# Patient Record
Sex: MALE | Race: BLACK OR AFRICAN AMERICAN | NOT HISPANIC OR LATINO | URBAN - METROPOLITAN AREA
[De-identification: names, ages, dates, MRNs, and addresses within clinical notes are randomized per-mention and may not be internally consistent; named-entity substitution may affect disease eponyms.]

---

## 2019-01-01 ENCOUNTER — INPATIENT (INPATIENT)
Facility: HOSPITAL | Age: 64
LOS: 20 days | DRG: 314 | End: 2019-04-04
Attending: INTERNAL MEDICINE | Admitting: HOSPITALIST
Payer: COMMERCIAL

## 2019-01-01 VITALS
TEMPERATURE: 98 F | WEIGHT: 195.11 LBS | SYSTOLIC BLOOD PRESSURE: 104 MMHG | RESPIRATION RATE: 16 BRPM | HEIGHT: 71 IN | HEART RATE: 85 BPM | DIASTOLIC BLOOD PRESSURE: 58 MMHG | OXYGEN SATURATION: 98 %

## 2019-01-01 VITALS
HEART RATE: 142 BPM | RESPIRATION RATE: 20 BRPM | SYSTOLIC BLOOD PRESSURE: 86 MMHG | OXYGEN SATURATION: 97 % | DIASTOLIC BLOOD PRESSURE: 63 MMHG | TEMPERATURE: 97 F

## 2019-01-01 DIAGNOSIS — R52 PAIN, UNSPECIFIED: ICD-10-CM

## 2019-01-01 DIAGNOSIS — I38 ENDOCARDITIS, VALVE UNSPECIFIED: ICD-10-CM

## 2019-01-01 DIAGNOSIS — T82.6XXA INFECTION AND INFLAMMATORY REACTION DUE TO CARDIAC VALVE PROSTHESIS, INITIAL ENCOUNTER: ICD-10-CM

## 2019-01-01 DIAGNOSIS — Z95.2 PRESENCE OF PROSTHETIC HEART VALVE: Chronic | ICD-10-CM

## 2019-01-01 DIAGNOSIS — R56.9 UNSPECIFIED CONVULSIONS: ICD-10-CM

## 2019-01-01 DIAGNOSIS — N17.9 ACUTE KIDNEY FAILURE, UNSPECIFIED: ICD-10-CM

## 2019-01-01 DIAGNOSIS — E03.9 HYPOTHYROIDISM, UNSPECIFIED: ICD-10-CM

## 2019-01-01 DIAGNOSIS — Z29.9 ENCOUNTER FOR PROPHYLACTIC MEASURES, UNSPECIFIED: ICD-10-CM

## 2019-01-01 DIAGNOSIS — F22 DELUSIONAL DISORDERS: ICD-10-CM

## 2019-01-01 DIAGNOSIS — Z90.5 ACQUIRED ABSENCE OF KIDNEY: Chronic | ICD-10-CM

## 2019-01-01 DIAGNOSIS — I50.9 HEART FAILURE, UNSPECIFIED: ICD-10-CM

## 2019-01-01 DIAGNOSIS — I33.0 ACUTE AND SUBACUTE INFECTIVE ENDOCARDITIS: ICD-10-CM

## 2019-01-01 DIAGNOSIS — A41.9 SEPSIS, UNSPECIFIED ORGANISM: ICD-10-CM

## 2019-01-01 DIAGNOSIS — I76 SEPTIC ARTERIAL EMBOLISM: ICD-10-CM

## 2019-01-01 DIAGNOSIS — D69.6 THROMBOCYTOPENIA, UNSPECIFIED: ICD-10-CM

## 2019-01-01 DIAGNOSIS — R50.9 FEVER, UNSPECIFIED: ICD-10-CM

## 2019-01-01 DIAGNOSIS — I10 ESSENTIAL (PRIMARY) HYPERTENSION: ICD-10-CM

## 2019-01-01 DIAGNOSIS — D61.818 OTHER PANCYTOPENIA: ICD-10-CM

## 2019-01-01 DIAGNOSIS — Z95.2 PRESENCE OF PROSTHETIC HEART VALVE: ICD-10-CM

## 2019-01-01 DIAGNOSIS — I95.89 OTHER HYPOTENSION: ICD-10-CM

## 2019-01-01 DIAGNOSIS — F05 DELIRIUM DUE TO KNOWN PHYSIOLOGICAL CONDITION: ICD-10-CM

## 2019-01-01 DIAGNOSIS — Z51.5 ENCOUNTER FOR PALLIATIVE CARE: ICD-10-CM

## 2019-01-01 DIAGNOSIS — R45.1 RESTLESSNESS AND AGITATION: ICD-10-CM

## 2019-01-01 DIAGNOSIS — D64.9 ANEMIA, UNSPECIFIED: ICD-10-CM

## 2019-01-01 DIAGNOSIS — N40.0 BENIGN PROSTATIC HYPERPLASIA WITHOUT LOWER URINARY TRACT SYMPTOMS: ICD-10-CM

## 2019-01-01 LAB
ALBUMIN SERPL ELPH-MCNC: 2.1 G/DL — LOW (ref 3.3–5)
ALBUMIN SERPL ELPH-MCNC: 2.6 G/DL — LOW (ref 3.3–5)
ALBUMIN SERPL ELPH-MCNC: 2.8 G/DL — LOW (ref 3.3–5)
ALP SERPL-CCNC: 104 U/L — SIGNIFICANT CHANGE UP (ref 40–120)
ALP SERPL-CCNC: 70 U/L — SIGNIFICANT CHANGE UP (ref 40–120)
ALP SERPL-CCNC: 80 U/L — SIGNIFICANT CHANGE UP (ref 40–120)
ALT FLD-CCNC: 10 U/L — SIGNIFICANT CHANGE UP (ref 10–45)
ALT FLD-CCNC: 22 U/L — SIGNIFICANT CHANGE UP (ref 10–45)
ALT FLD-CCNC: 33 U/L — SIGNIFICANT CHANGE UP (ref 10–45)
AMPHET UR-MCNC: NEGATIVE — SIGNIFICANT CHANGE UP
ANION GAP SERPL CALC-SCNC: 13 MMOL/L — SIGNIFICANT CHANGE UP (ref 5–17)
ANION GAP SERPL CALC-SCNC: 14 MMOL/L — SIGNIFICANT CHANGE UP (ref 5–17)
ANION GAP SERPL CALC-SCNC: 15 MMOL/L — SIGNIFICANT CHANGE UP (ref 5–17)
ANION GAP SERPL CALC-SCNC: 16 MMOL/L — SIGNIFICANT CHANGE UP (ref 5–17)
ANION GAP SERPL CALC-SCNC: 16 MMOL/L — SIGNIFICANT CHANGE UP (ref 5–17)
ANION GAP SERPL CALC-SCNC: 17 MMOL/L — SIGNIFICANT CHANGE UP (ref 5–17)
APPEARANCE UR: ABNORMAL
APPEARANCE UR: CLEAR — SIGNIFICANT CHANGE UP
APTT BLD: 43 SEC — HIGH (ref 27.5–36.3)
APTT BLD: 44.4 SEC — HIGH (ref 27.5–36.3)
APTT BLD: 44.9 SEC — HIGH (ref 27.5–36.3)
APTT BLD: 46.2 SEC — HIGH (ref 27.5–36.3)
AST SERPL-CCNC: 37 U/L — SIGNIFICANT CHANGE UP (ref 10–40)
AST SERPL-CCNC: 47 U/L — HIGH (ref 10–40)
AST SERPL-CCNC: 68 U/L — HIGH (ref 10–40)
B HENSELAE IGG SER-ACNC: SIGNIFICANT CHANGE UP TITER
B HENSELAE IGM SER-ACNC: SIGNIFICANT CHANGE UP TITER
B QUINTANA IGG SERPL-ACNC: SIGNIFICANT CHANGE UP TITER
B QUINTANA IGM SER-ACNC: SIGNIFICANT CHANGE UP TITER
B19V IGG SER-ACNC: 5.4 INDEX — HIGH (ref 0–0.8)
B19V IGG+IGM SER-IMP: POSITIVE
B19V IGG+IGM SER-IMP: SIGNIFICANT CHANGE UP
B19V IGM FLD-ACNC: 0.1 — SIGNIFICANT CHANGE UP
B19V IGM SER-ACNC: NEGATIVE — SIGNIFICANT CHANGE UP
BACTERIA # UR AUTO: NEGATIVE — SIGNIFICANT CHANGE UP
BARBITURATES UR SCN-MCNC: NEGATIVE — SIGNIFICANT CHANGE UP
BASE EXCESS BLDV CALC-SCNC: 1.3 MMOL/L — SIGNIFICANT CHANGE UP (ref -2–2)
BASOPHILS # BLD AUTO: 0 K/UL — SIGNIFICANT CHANGE UP (ref 0–0.2)
BASOPHILS # BLD AUTO: 0.03 K/UL — SIGNIFICANT CHANGE UP (ref 0–0.2)
BASOPHILS # BLD AUTO: 0.04 K/UL — SIGNIFICANT CHANGE UP (ref 0–0.2)
BASOPHILS # BLD AUTO: 0.05 K/UL — SIGNIFICANT CHANGE UP (ref 0–0.2)
BASOPHILS NFR BLD AUTO: 0.1 % — SIGNIFICANT CHANGE UP (ref 0–2)
BASOPHILS NFR BLD AUTO: 0.5 % — SIGNIFICANT CHANGE UP (ref 0–2)
BASOPHILS NFR BLD AUTO: 0.9 % — SIGNIFICANT CHANGE UP (ref 0–2)
BASOPHILS NFR BLD AUTO: 1.6 % — SIGNIFICANT CHANGE UP (ref 0–2)
BENZODIAZ UR-MCNC: NEGATIVE — SIGNIFICANT CHANGE UP
BILIRUB SERPL-MCNC: 0.5 MG/DL — SIGNIFICANT CHANGE UP (ref 0.2–1.2)
BILIRUB SERPL-MCNC: 0.6 MG/DL — SIGNIFICANT CHANGE UP (ref 0.2–1.2)
BILIRUB SERPL-MCNC: 0.6 MG/DL — SIGNIFICANT CHANGE UP (ref 0.2–1.2)
BILIRUB UR-MCNC: NEGATIVE — SIGNIFICANT CHANGE UP
BILIRUB UR-MCNC: NEGATIVE — SIGNIFICANT CHANGE UP
BLD GP AB SCN SERPL QL: NEGATIVE — SIGNIFICANT CHANGE UP
BLD GP AB SCN SERPL QL: NEGATIVE — SIGNIFICANT CHANGE UP
BRUCELLA IGG SER QL IA: NEGATIVE — SIGNIFICANT CHANGE UP
BRUCELLA IGG+IGM SER-IMP: SIGNIFICANT CHANGE UP
BRUCELLA IGM SER QL IA: NEGATIVE — SIGNIFICANT CHANGE UP
BUN SERPL-MCNC: 19 MG/DL — SIGNIFICANT CHANGE UP (ref 7–23)
BUN SERPL-MCNC: 22 MG/DL — SIGNIFICANT CHANGE UP (ref 7–23)
BUN SERPL-MCNC: 22 MG/DL — SIGNIFICANT CHANGE UP (ref 7–23)
BUN SERPL-MCNC: 23 MG/DL — SIGNIFICANT CHANGE UP (ref 7–23)
BUN SERPL-MCNC: 24 MG/DL — HIGH (ref 7–23)
BUN SERPL-MCNC: 25 MG/DL — HIGH (ref 7–23)
BUN SERPL-MCNC: 26 MG/DL — HIGH (ref 7–23)
BUN SERPL-MCNC: 27 MG/DL — HIGH (ref 7–23)
BUN SERPL-MCNC: 28 MG/DL — HIGH (ref 7–23)
BUN SERPL-MCNC: 28 MG/DL — HIGH (ref 7–23)
BUN SERPL-MCNC: 30 MG/DL — HIGH (ref 7–23)
BUN SERPL-MCNC: 32 MG/DL — HIGH (ref 7–23)
BUN SERPL-MCNC: 41 MG/DL — HIGH (ref 7–23)
C BURNET PH1 + PH2 IGG+IGM SER-IMP: SIGNIFICANT CHANGE UP
C BURNET PH1 IGG TITR FLD: SIGNIFICANT CHANGE UP
C BURNET PH1 IGG TITR FLD: SIGNIFICANT CHANGE UP
C BURNET PH1 IGM TITR FLD: SIGNIFICANT CHANGE UP
C BURNET PH1 IGM TITR FLD: SIGNIFICANT CHANGE UP
C PNEUM IGM TITR SER: SIGNIFICANT CHANGE UP TITER
C PSITTACI IGG SER-ACNC: SIGNIFICANT CHANGE UP TITER
C PSITTACI IGM TITR SER: SIGNIFICANT CHANGE UP TITER
C TRACH IGG TITR SER: SIGNIFICANT CHANGE UP TITER
C TRACH IGM SER-ACNC: SIGNIFICANT CHANGE UP TITER
CA-I SERPL-SCNC: 1.12 MMOL/L — SIGNIFICANT CHANGE UP (ref 1.12–1.3)
CALCIUM SERPL-MCNC: 8.2 MG/DL — LOW (ref 8.4–10.5)
CALCIUM SERPL-MCNC: 8.2 MG/DL — LOW (ref 8.4–10.5)
CALCIUM SERPL-MCNC: 8.3 MG/DL — LOW (ref 8.4–10.5)
CALCIUM SERPL-MCNC: 8.3 MG/DL — LOW (ref 8.4–10.5)
CALCIUM SERPL-MCNC: 8.6 MG/DL — SIGNIFICANT CHANGE UP (ref 8.4–10.5)
CALCIUM SERPL-MCNC: 8.7 MG/DL — SIGNIFICANT CHANGE UP (ref 8.4–10.5)
CALCIUM SERPL-MCNC: 8.8 MG/DL — SIGNIFICANT CHANGE UP (ref 8.4–10.5)
CALCIUM SERPL-MCNC: 8.8 MG/DL — SIGNIFICANT CHANGE UP (ref 8.4–10.5)
CALCIUM SERPL-MCNC: 8.9 MG/DL — SIGNIFICANT CHANGE UP (ref 8.4–10.5)
CALCIUM SERPL-MCNC: 8.9 MG/DL — SIGNIFICANT CHANGE UP (ref 8.4–10.5)
CALCIUM SERPL-MCNC: 9 MG/DL — SIGNIFICANT CHANGE UP (ref 8.4–10.5)
CALCIUM SERPL-MCNC: 9.1 MG/DL — SIGNIFICANT CHANGE UP (ref 8.4–10.5)
CALCIUM SERPL-MCNC: 9.2 MG/DL — SIGNIFICANT CHANGE UP (ref 8.4–10.5)
CALCIUM SERPL-MCNC: 9.3 MG/DL — SIGNIFICANT CHANGE UP (ref 8.4–10.5)
CALCIUM SERPL-MCNC: 9.4 MG/DL — SIGNIFICANT CHANGE UP (ref 8.4–10.5)
CHLAMYDIA IGG SER-ACNC: SIGNIFICANT CHANGE UP TITER
CHLORIDE BLDV-SCNC: 103 MMOL/L — SIGNIFICANT CHANGE UP (ref 96–108)
CHLORIDE SERPL-SCNC: 100 MMOL/L — SIGNIFICANT CHANGE UP (ref 96–108)
CHLORIDE SERPL-SCNC: 101 MMOL/L — SIGNIFICANT CHANGE UP (ref 96–108)
CHLORIDE SERPL-SCNC: 102 MMOL/L — SIGNIFICANT CHANGE UP (ref 96–108)
CHLORIDE SERPL-SCNC: 103 MMOL/L — SIGNIFICANT CHANGE UP (ref 96–108)
CHLORIDE SERPL-SCNC: 104 MMOL/L — SIGNIFICANT CHANGE UP (ref 96–108)
CHLORIDE SERPL-SCNC: 104 MMOL/L — SIGNIFICANT CHANGE UP (ref 96–108)
CHLORIDE SERPL-SCNC: 105 MMOL/L — SIGNIFICANT CHANGE UP (ref 96–108)
CHLORIDE SERPL-SCNC: 105 MMOL/L — SIGNIFICANT CHANGE UP (ref 96–108)
CHLORIDE SERPL-SCNC: 106 MMOL/L — SIGNIFICANT CHANGE UP (ref 96–108)
CHLORIDE SERPL-SCNC: 97 MMOL/L — SIGNIFICANT CHANGE UP (ref 96–108)
CHLORIDE SERPL-SCNC: 98 MMOL/L — SIGNIFICANT CHANGE UP (ref 96–108)
CHLORIDE SERPL-SCNC: 98 MMOL/L — SIGNIFICANT CHANGE UP (ref 96–108)
CHOLEST SERPL-MCNC: 90 MG/DL — SIGNIFICANT CHANGE UP (ref 10–199)
CK MB BLD-MCNC: 8.8 % — HIGH (ref 0–3.5)
CK MB CFR SERPL CALC: 5.1 NG/ML — SIGNIFICANT CHANGE UP (ref 0–6.7)
CK SERPL-CCNC: 109 U/L — SIGNIFICANT CHANGE UP (ref 30–200)
CK SERPL-CCNC: 58 U/L — SIGNIFICANT CHANGE UP (ref 30–200)
CMV IGG FLD QL: 0.27 U/ML — SIGNIFICANT CHANGE UP
CMV IGG SERPL-IMP: NEGATIVE — SIGNIFICANT CHANGE UP
CMV IGM FLD-ACNC: <8 AU/ML — SIGNIFICANT CHANGE UP
CMV IGM SERPL QL: NEGATIVE — SIGNIFICANT CHANGE UP
CO2 BLDV-SCNC: 26 MMOL/L — SIGNIFICANT CHANGE UP (ref 22–30)
CO2 SERPL-SCNC: 20 MMOL/L — LOW (ref 22–31)
CO2 SERPL-SCNC: 21 MMOL/L — LOW (ref 22–31)
CO2 SERPL-SCNC: 21 MMOL/L — LOW (ref 22–31)
CO2 SERPL-SCNC: 22 MMOL/L — SIGNIFICANT CHANGE UP (ref 22–31)
CO2 SERPL-SCNC: 23 MMOL/L — SIGNIFICANT CHANGE UP (ref 22–31)
CO2 SERPL-SCNC: 24 MMOL/L — SIGNIFICANT CHANGE UP (ref 22–31)
CO2 SERPL-SCNC: 25 MMOL/L — SIGNIFICANT CHANGE UP (ref 22–31)
COCAINE METAB.OTHER UR-MCNC: NEGATIVE — SIGNIFICANT CHANGE UP
COLOR SPEC: ABNORMAL
COLOR SPEC: ABNORMAL
CREAT ?TM UR-MCNC: 347 MG/DL — SIGNIFICANT CHANGE UP
CREAT ?TM UR-MCNC: 53 MG/DL — SIGNIFICANT CHANGE UP
CREAT SERPL-MCNC: 2.32 MG/DL — HIGH (ref 0.5–1.3)
CREAT SERPL-MCNC: 2.41 MG/DL — HIGH (ref 0.5–1.3)
CREAT SERPL-MCNC: 2.41 MG/DL — HIGH (ref 0.5–1.3)
CREAT SERPL-MCNC: 2.5 MG/DL — HIGH (ref 0.5–1.3)
CREAT SERPL-MCNC: 2.5 MG/DL — HIGH (ref 0.5–1.3)
CREAT SERPL-MCNC: 2.6 MG/DL — HIGH (ref 0.5–1.3)
CREAT SERPL-MCNC: 2.66 MG/DL — HIGH (ref 0.5–1.3)
CREAT SERPL-MCNC: 2.8 MG/DL — HIGH (ref 0.5–1.3)
CREAT SERPL-MCNC: 2.83 MG/DL — HIGH (ref 0.5–1.3)
CREAT SERPL-MCNC: 2.84 MG/DL — HIGH (ref 0.5–1.3)
CREAT SERPL-MCNC: 2.88 MG/DL — HIGH (ref 0.5–1.3)
CREAT SERPL-MCNC: 3.01 MG/DL — HIGH (ref 0.5–1.3)
CREAT SERPL-MCNC: 3.03 MG/DL — HIGH (ref 0.5–1.3)
CREAT SERPL-MCNC: 3.14 MG/DL — HIGH (ref 0.5–1.3)
CREAT SERPL-MCNC: 4.37 MG/DL — HIGH (ref 0.5–1.3)
CREAT SERPL-MCNC: 4.84 MG/DL — HIGH (ref 0.5–1.3)
CREAT SERPL-MCNC: 6.57 MG/DL — HIGH (ref 0.5–1.3)
CULTURE RESULTS: NO GROWTH — SIGNIFICANT CHANGE UP
CULTURE RESULTS: NO GROWTH — SIGNIFICANT CHANGE UP
CULTURE RESULTS: SIGNIFICANT CHANGE UP
DIFF PNL FLD: ABNORMAL
DIFF PNL FLD: ABNORMAL
EBV EA AB SER IA-ACNC: <5 U/ML — SIGNIFICANT CHANGE UP
EBV EA AB TITR SER IF: POSITIVE
EBV EA IGG SER-ACNC: NEGATIVE — SIGNIFICANT CHANGE UP
EBV NA IGG SER IA-ACNC: 166 U/ML — HIGH
EBV PATRN SPEC IB-IMP: SIGNIFICANT CHANGE UP
EBV VCA IGG AVIDITY SER QL IA: POSITIVE
EBV VCA IGM SER IA-ACNC: 297 U/ML — HIGH
EBV VCA IGM SER IA-ACNC: <10 U/ML — SIGNIFICANT CHANGE UP
EBV VCA IGM TITR FLD: NEGATIVE — SIGNIFICANT CHANGE UP
EOSINOPHIL # BLD AUTO: 0.04 K/UL — SIGNIFICANT CHANGE UP (ref 0–0.5)
EOSINOPHIL # BLD AUTO: 0.17 K/UL — SIGNIFICANT CHANGE UP (ref 0–0.5)
EOSINOPHIL # BLD AUTO: 0.6 K/UL — HIGH (ref 0–0.5)
EOSINOPHIL # BLD AUTO: 0.65 K/UL — HIGH (ref 0–0.5)
EOSINOPHIL NFR BLD AUTO: 1.2 % — SIGNIFICANT CHANGE UP (ref 0–6)
EOSINOPHIL NFR BLD AUTO: 5.6 % — SIGNIFICANT CHANGE UP (ref 0–6)
EOSINOPHIL NFR BLD AUTO: 5.8 % — SIGNIFICANT CHANGE UP (ref 0–6)
EOSINOPHIL NFR BLD AUTO: 7.4 % — HIGH (ref 0–6)
EPI CELLS # UR: 1 /HPF — SIGNIFICANT CHANGE UP
FERRITIN SERPL-MCNC: 924 NG/ML — HIGH (ref 30–400)
FOLATE SERPL-MCNC: 5.3 NG/ML — SIGNIFICANT CHANGE UP
GAMMA INTERFERON BACKGROUND BLD IA-ACNC: 0.02 IU/ML — SIGNIFICANT CHANGE UP
GAS PNL BLDV: 132 MMOL/L — LOW (ref 136–145)
GAS PNL BLDV: SIGNIFICANT CHANGE UP
GLUCOSE BLDV-MCNC: 94 MG/DL — SIGNIFICANT CHANGE UP (ref 70–99)
GLUCOSE SERPL-MCNC: 100 MG/DL — HIGH (ref 70–99)
GLUCOSE SERPL-MCNC: 102 MG/DL — HIGH (ref 70–99)
GLUCOSE SERPL-MCNC: 103 MG/DL — HIGH (ref 70–99)
GLUCOSE SERPL-MCNC: 107 MG/DL — HIGH (ref 70–99)
GLUCOSE SERPL-MCNC: 108 MG/DL — HIGH (ref 70–99)
GLUCOSE SERPL-MCNC: 110 MG/DL — HIGH (ref 70–99)
GLUCOSE SERPL-MCNC: 112 MG/DL — HIGH (ref 70–99)
GLUCOSE SERPL-MCNC: 113 MG/DL — HIGH (ref 70–99)
GLUCOSE SERPL-MCNC: 117 MG/DL — HIGH (ref 70–99)
GLUCOSE SERPL-MCNC: 118 MG/DL — HIGH (ref 70–99)
GLUCOSE SERPL-MCNC: 124 MG/DL — HIGH (ref 70–99)
GLUCOSE SERPL-MCNC: 136 MG/DL — HIGH (ref 70–99)
GLUCOSE SERPL-MCNC: 81 MG/DL — SIGNIFICANT CHANGE UP (ref 70–99)
GLUCOSE SERPL-MCNC: 86 MG/DL — SIGNIFICANT CHANGE UP (ref 70–99)
GLUCOSE SERPL-MCNC: 92 MG/DL — SIGNIFICANT CHANGE UP (ref 70–99)
GLUCOSE SERPL-MCNC: 95 MG/DL — SIGNIFICANT CHANGE UP (ref 70–99)
GLUCOSE SERPL-MCNC: 98 MG/DL — SIGNIFICANT CHANGE UP (ref 70–99)
GLUCOSE UR QL: NEGATIVE — SIGNIFICANT CHANGE UP
GLUCOSE UR QL: NEGATIVE — SIGNIFICANT CHANGE UP
HBA1C BLD-MCNC: 6 % — HIGH (ref 4–5.6)
HCO3 BLDV-SCNC: 25 MMOL/L — SIGNIFICANT CHANGE UP (ref 21–29)
HCT VFR BLD CALC: 23.9 % — LOW (ref 39–50)
HCT VFR BLD CALC: 26.3 % — LOW (ref 39–50)
HCT VFR BLD CALC: 26.8 % — LOW (ref 39–50)
HCT VFR BLD CALC: 27.2 % — LOW (ref 39–50)
HCT VFR BLD CALC: 27.7 % — LOW (ref 39–50)
HCT VFR BLD CALC: 27.8 % — LOW (ref 39–50)
HCT VFR BLD CALC: 27.8 % — LOW (ref 39–50)
HCT VFR BLD CALC: 28.5 % — LOW (ref 39–50)
HCT VFR BLD CALC: 29 % — LOW (ref 39–50)
HCT VFR BLD CALC: 29.4 % — LOW (ref 39–50)
HCT VFR BLD CALC: 30.3 % — LOW (ref 39–50)
HCT VFR BLD CALC: 31.9 % — LOW (ref 39–50)
HCT VFR BLD CALC: 33.2 % — LOW (ref 39–50)
HCT VFR BLD CALC: 33.9 % — LOW (ref 39–50)
HCT VFR BLD CALC: 35.1 % — LOW (ref 39–50)
HCT VFR BLDA CALC: 30 % — LOW (ref 39–50)
HCV AB S/CO SERPL IA: 0.11 S/CO — SIGNIFICANT CHANGE UP (ref 0–0.79)
HCV AB SERPL-IMP: SIGNIFICANT CHANGE UP
HDLC SERPL-MCNC: 18 MG/DL — LOW
HGB BLD CALC-MCNC: 9.7 G/DL — LOW (ref 13–17)
HGB BLD-MCNC: 10.1 G/DL — LOW (ref 13–17)
HGB BLD-MCNC: 10.8 G/DL — LOW (ref 13–17)
HGB BLD-MCNC: 10.9 G/DL — LOW (ref 13–17)
HGB BLD-MCNC: 7.3 G/DL — LOW (ref 13–17)
HGB BLD-MCNC: 8.2 G/DL — LOW (ref 13–17)
HGB BLD-MCNC: 8.4 G/DL — LOW (ref 13–17)
HGB BLD-MCNC: 8.5 G/DL — LOW (ref 13–17)
HGB BLD-MCNC: 8.6 G/DL — LOW (ref 13–17)
HGB BLD-MCNC: 8.8 G/DL — LOW (ref 13–17)
HGB BLD-MCNC: 8.9 G/DL — LOW (ref 13–17)
HGB BLD-MCNC: 9 G/DL — LOW (ref 13–17)
HGB BLD-MCNC: 9 G/DL — LOW (ref 13–17)
HGB BLD-MCNC: 9.4 G/DL — LOW (ref 13–17)
HGB BLD-MCNC: 9.7 G/DL — LOW (ref 13–17)
HGB BLD-MCNC: 9.8 G/DL — LOW (ref 13–17)
HIV 1+2 AB+HIV1 P24 AG SERPL QL IA: SIGNIFICANT CHANGE UP
HYALINE CASTS # UR AUTO: 5 /LPF — HIGH (ref 0–2)
IMM GRANULOCYTES NFR BLD AUTO: 0 % — SIGNIFICANT CHANGE UP (ref 0–1.5)
IMM GRANULOCYTES NFR BLD AUTO: 0.3 % — SIGNIFICANT CHANGE UP (ref 0–1.5)
IMM GRANULOCYTES NFR BLD AUTO: 0.5 % — SIGNIFICANT CHANGE UP (ref 0–1.5)
INR BLD: 1.22 RATIO — HIGH (ref 0.88–1.16)
INR BLD: 1.24 RATIO — HIGH (ref 0.88–1.16)
INR BLD: 1.25 RATIO — HIGH (ref 0.88–1.16)
INR BLD: 1.29 RATIO — HIGH (ref 0.88–1.16)
INR BLD: 1.31 RATIO — HIGH (ref 0.88–1.16)
IRON SATN MFR SERPL: 17 % — SIGNIFICANT CHANGE UP (ref 16–55)
IRON SATN MFR SERPL: 19 UG/DL — LOW (ref 45–165)
KETONES UR-MCNC: ABNORMAL
KETONES UR-MCNC: NEGATIVE — SIGNIFICANT CHANGE UP
LACTATE BLDV-MCNC: 1.6 MMOL/L — SIGNIFICANT CHANGE UP (ref 0.7–2)
LACTATE SERPL-SCNC: 1.8 MMOL/L — SIGNIFICANT CHANGE UP (ref 0.7–2)
LEGIONELLA AB SER-ACNC: NEGATIVE — SIGNIFICANT CHANGE UP
LEUKOCYTE ESTERASE UR-ACNC: NEGATIVE — SIGNIFICANT CHANGE UP
LEUKOCYTE ESTERASE UR-ACNC: NEGATIVE — SIGNIFICANT CHANGE UP
LIPID PNL WITH DIRECT LDL SERPL: 46 MG/DL — SIGNIFICANT CHANGE UP
LYMPHOCYTES # BLD AUTO: 0.5 K/UL — LOW (ref 1–3.3)
LYMPHOCYTES # BLD AUTO: 0.59 K/UL — LOW (ref 1–3.3)
LYMPHOCYTES # BLD AUTO: 0.74 K/UL — LOW (ref 1–3.3)
LYMPHOCYTES # BLD AUTO: 0.81 K/UL — LOW (ref 1–3.3)
LYMPHOCYTES # BLD AUTO: 22.4 % — SIGNIFICANT CHANGE UP (ref 13–44)
LYMPHOCYTES # BLD AUTO: 26.6 % — SIGNIFICANT CHANGE UP (ref 13–44)
LYMPHOCYTES # BLD AUTO: 4.8 % — LOW (ref 13–44)
LYMPHOCYTES # BLD AUTO: 6.7 % — LOW (ref 13–44)
M TB IFN-G BLD-IMP: NEGATIVE — SIGNIFICANT CHANGE UP
M TB IFN-G CD4+ BCKGRND COR BLD-ACNC: 0 IU/ML — SIGNIFICANT CHANGE UP
M TB IFN-G CD4+CD8+ BCKGRND COR BLD-ACNC: 0 IU/ML — SIGNIFICANT CHANGE UP
MAGNESIUM SERPL-MCNC: 1.6 MG/DL — SIGNIFICANT CHANGE UP (ref 1.6–2.6)
MAGNESIUM SERPL-MCNC: 1.7 MG/DL — SIGNIFICANT CHANGE UP (ref 1.6–2.6)
MAGNESIUM SERPL-MCNC: 1.8 MG/DL — SIGNIFICANT CHANGE UP (ref 1.6–2.6)
MAGNESIUM SERPL-MCNC: 1.9 MG/DL — SIGNIFICANT CHANGE UP (ref 1.6–2.6)
MAGNESIUM SERPL-MCNC: 2 MG/DL — SIGNIFICANT CHANGE UP (ref 1.6–2.6)
MAGNESIUM SERPL-MCNC: 2.2 MG/DL — SIGNIFICANT CHANGE UP (ref 1.6–2.6)
MAGNESIUM SERPL-MCNC: 2.5 MG/DL — SIGNIFICANT CHANGE UP (ref 1.6–2.6)
MANUAL SMEAR VERIFICATION: SIGNIFICANT CHANGE UP
MCHC RBC-ENTMCNC: 27.3 PG — SIGNIFICANT CHANGE UP (ref 27–34)
MCHC RBC-ENTMCNC: 27.3 PG — SIGNIFICANT CHANGE UP (ref 27–34)
MCHC RBC-ENTMCNC: 27.4 PG — SIGNIFICANT CHANGE UP (ref 27–34)
MCHC RBC-ENTMCNC: 27.7 PG — SIGNIFICANT CHANGE UP (ref 27–34)
MCHC RBC-ENTMCNC: 27.8 PG — SIGNIFICANT CHANGE UP (ref 27–34)
MCHC RBC-ENTMCNC: 27.9 PG — SIGNIFICANT CHANGE UP (ref 27–34)
MCHC RBC-ENTMCNC: 27.9 PG — SIGNIFICANT CHANGE UP (ref 27–34)
MCHC RBC-ENTMCNC: 28.1 PG — SIGNIFICANT CHANGE UP (ref 27–34)
MCHC RBC-ENTMCNC: 28.2 PG — SIGNIFICANT CHANGE UP (ref 27–34)
MCHC RBC-ENTMCNC: 28.7 PG — SIGNIFICANT CHANGE UP (ref 27–34)
MCHC RBC-ENTMCNC: 29.2 PG — SIGNIFICANT CHANGE UP (ref 27–34)
MCHC RBC-ENTMCNC: 29.8 GM/DL — LOW (ref 32–36)
MCHC RBC-ENTMCNC: 30.5 GM/DL — LOW (ref 32–36)
MCHC RBC-ENTMCNC: 30.6 GM/DL — LOW (ref 32–36)
MCHC RBC-ENTMCNC: 30.7 GM/DL — LOW (ref 32–36)
MCHC RBC-ENTMCNC: 30.8 GM/DL — LOW (ref 32–36)
MCHC RBC-ENTMCNC: 31 GM/DL — LOW (ref 32–36)
MCHC RBC-ENTMCNC: 31 GM/DL — LOW (ref 32–36)
MCHC RBC-ENTMCNC: 31.2 GM/DL — LOW (ref 32–36)
MCHC RBC-ENTMCNC: 31.2 GM/DL — LOW (ref 32–36)
MCHC RBC-ENTMCNC: 31.3 GM/DL — LOW (ref 32–36)
MCHC RBC-ENTMCNC: 31.7 GM/DL — LOW (ref 32–36)
MCHC RBC-ENTMCNC: 31.9 GM/DL — LOW (ref 32–36)
MCHC RBC-ENTMCNC: 32 GM/DL — SIGNIFICANT CHANGE UP (ref 32–36)
MCHC RBC-ENTMCNC: 32.9 GM/DL — SIGNIFICANT CHANGE UP (ref 32–36)
MCHC RBC-ENTMCNC: 33.2 GM/DL — SIGNIFICANT CHANGE UP (ref 32–36)
MCV RBC AUTO: 87 FL — SIGNIFICANT CHANGE UP (ref 80–100)
MCV RBC AUTO: 87.3 FL — SIGNIFICANT CHANGE UP (ref 80–100)
MCV RBC AUTO: 87.5 FL — SIGNIFICANT CHANGE UP (ref 80–100)
MCV RBC AUTO: 87.7 FL — SIGNIFICANT CHANGE UP (ref 80–100)
MCV RBC AUTO: 87.9 FL — SIGNIFICANT CHANGE UP (ref 80–100)
MCV RBC AUTO: 88.2 FL — SIGNIFICANT CHANGE UP (ref 80–100)
MCV RBC AUTO: 88.4 FL — SIGNIFICANT CHANGE UP (ref 80–100)
MCV RBC AUTO: 88.8 FL — SIGNIFICANT CHANGE UP (ref 80–100)
MCV RBC AUTO: 88.9 FL — SIGNIFICANT CHANGE UP (ref 80–100)
MCV RBC AUTO: 89.4 FL — SIGNIFICANT CHANGE UP (ref 80–100)
MCV RBC AUTO: 89.9 FL — SIGNIFICANT CHANGE UP (ref 80–100)
MCV RBC AUTO: 90.1 FL — SIGNIFICANT CHANGE UP (ref 80–100)
MCV RBC AUTO: 90.2 FL — SIGNIFICANT CHANGE UP (ref 80–100)
MCV RBC AUTO: 91.2 FL — SIGNIFICANT CHANGE UP (ref 80–100)
MCV RBC AUTO: 92.1 FL — SIGNIFICANT CHANGE UP (ref 80–100)
METHADONE UR-MCNC: NEGATIVE — SIGNIFICANT CHANGE UP
MONOCYTES # BLD AUTO: 1.13 K/UL — HIGH (ref 0–0.9)
MONOCYTES # BLD AUTO: 1.4 K/UL — HIGH (ref 0–0.9)
MONOCYTES # BLD AUTO: 1.67 K/UL — HIGH (ref 0–0.9)
MONOCYTES # BLD AUTO: 2.27 K/UL — HIGH (ref 0–0.9)
MONOCYTES NFR BLD AUTO: 12.8 % — SIGNIFICANT CHANGE UP (ref 2–14)
MONOCYTES NFR BLD AUTO: 13 % — SIGNIFICANT CHANGE UP (ref 2–14)
MONOCYTES NFR BLD AUTO: 54.9 % — HIGH (ref 2–14)
MONOCYTES NFR BLD AUTO: 68.6 % — HIGH (ref 2–14)
NEUTROPHILS # BLD AUTO: 0.23 K/UL — LOW (ref 1.8–7.4)
NEUTROPHILS # BLD AUTO: 0.33 K/UL — LOW (ref 1.8–7.4)
NEUTROPHILS # BLD AUTO: 6.35 K/UL — SIGNIFICANT CHANGE UP (ref 1.8–7.4)
NEUTROPHILS # BLD AUTO: 8.1 K/UL — HIGH (ref 1.8–7.4)
NEUTROPHILS NFR BLD AUTO: 11 % — LOW (ref 43–77)
NEUTROPHILS NFR BLD AUTO: 6.9 % — LOW (ref 43–77)
NEUTROPHILS NFR BLD AUTO: 72.1 % — SIGNIFICANT CHANGE UP (ref 43–77)
NEUTROPHILS NFR BLD AUTO: 76.3 % — SIGNIFICANT CHANGE UP (ref 43–77)
NIGHT BLUE STAIN TISS: SIGNIFICANT CHANGE UP
NITRITE UR-MCNC: NEGATIVE — SIGNIFICANT CHANGE UP
NITRITE UR-MCNC: NEGATIVE — SIGNIFICANT CHANGE UP
OPIATES UR-MCNC: POSITIVE
OTHER CELLS CSF MANUAL: 4 ML/DL — LOW (ref 18–22)
OVALOCYTES BLD QL SMEAR: SLIGHT — SIGNIFICANT CHANGE UP
OXYCODONE UR-MCNC: NEGATIVE — SIGNIFICANT CHANGE UP
PCO2 BLDV: 40 MMHG — SIGNIFICANT CHANGE UP (ref 35–50)
PCP SPEC-MCNC: SIGNIFICANT CHANGE UP
PCP UR-MCNC: NEGATIVE — SIGNIFICANT CHANGE UP
PH BLDV: 7.42 — SIGNIFICANT CHANGE UP (ref 7.35–7.45)
PH UR: 5.5 — SIGNIFICANT CHANGE UP (ref 5–8)
PH UR: 6 — SIGNIFICANT CHANGE UP (ref 5–8)
PHOSPHATE SERPL-MCNC: 2.4 MG/DL — LOW (ref 2.5–4.5)
PHOSPHATE SERPL-MCNC: 2.4 MG/DL — LOW (ref 2.5–4.5)
PHOSPHATE SERPL-MCNC: 2.9 MG/DL — SIGNIFICANT CHANGE UP (ref 2.5–4.5)
PHOSPHATE SERPL-MCNC: 3 MG/DL — SIGNIFICANT CHANGE UP (ref 2.5–4.5)
PHOSPHATE SERPL-MCNC: 3 MG/DL — SIGNIFICANT CHANGE UP (ref 2.5–4.5)
PHOSPHATE SERPL-MCNC: 3.2 MG/DL — SIGNIFICANT CHANGE UP (ref 2.5–4.5)
PHOSPHATE SERPL-MCNC: 3.6 MG/DL — SIGNIFICANT CHANGE UP (ref 2.5–4.5)
PHOSPHATE SERPL-MCNC: 3.7 MG/DL — SIGNIFICANT CHANGE UP (ref 2.5–4.5)
PHOSPHATE SERPL-MCNC: 3.8 MG/DL — SIGNIFICANT CHANGE UP (ref 2.5–4.5)
PHOSPHATE SERPL-MCNC: 3.9 MG/DL — SIGNIFICANT CHANGE UP (ref 2.5–4.5)
PLAT MORPH BLD: NORMAL — SIGNIFICANT CHANGE UP
PLATELET # BLD AUTO: 53 K/UL — LOW (ref 150–400)
PLATELET # BLD AUTO: 58 K/UL — LOW (ref 150–400)
PLATELET # BLD AUTO: 66 K/UL — LOW (ref 150–400)
PLATELET # BLD AUTO: 66 K/UL — LOW (ref 150–400)
PLATELET # BLD AUTO: 69 K/UL — LOW (ref 150–400)
PLATELET # BLD AUTO: 73 K/UL — LOW (ref 150–400)
PLATELET # BLD AUTO: 74 K/UL — LOW (ref 150–400)
PLATELET # BLD AUTO: 74 K/UL — LOW (ref 150–400)
PLATELET # BLD AUTO: 76 K/UL — LOW (ref 150–400)
PLATELET # BLD AUTO: 82 K/UL — LOW (ref 150–400)
PLATELET # BLD AUTO: 87 K/UL — LOW (ref 150–400)
PLATELET # BLD AUTO: 90 K/UL — LOW (ref 150–400)
PLATELET # BLD AUTO: 91 K/UL — LOW (ref 150–400)
PLATELET # BLD AUTO: 93 K/UL — LOW (ref 150–400)
PLATELET # BLD AUTO: 94 K/UL — LOW (ref 150–400)
PO2 BLDV: 20 MMHG — LOW (ref 25–45)
POIKILOCYTOSIS BLD QL AUTO: SLIGHT — SIGNIFICANT CHANGE UP
POTASSIUM BLDV-SCNC: 3.8 MMOL/L — SIGNIFICANT CHANGE UP (ref 3.5–5.3)
POTASSIUM SERPL-MCNC: 3.4 MMOL/L — LOW (ref 3.5–5.3)
POTASSIUM SERPL-MCNC: 3.6 MMOL/L — SIGNIFICANT CHANGE UP (ref 3.5–5.3)
POTASSIUM SERPL-MCNC: 3.8 MMOL/L — SIGNIFICANT CHANGE UP (ref 3.5–5.3)
POTASSIUM SERPL-MCNC: 3.8 MMOL/L — SIGNIFICANT CHANGE UP (ref 3.5–5.3)
POTASSIUM SERPL-MCNC: 3.9 MMOL/L — SIGNIFICANT CHANGE UP (ref 3.5–5.3)
POTASSIUM SERPL-MCNC: 4 MMOL/L — SIGNIFICANT CHANGE UP (ref 3.5–5.3)
POTASSIUM SERPL-MCNC: 4.1 MMOL/L — SIGNIFICANT CHANGE UP (ref 3.5–5.3)
POTASSIUM SERPL-MCNC: 4.3 MMOL/L — SIGNIFICANT CHANGE UP (ref 3.5–5.3)
POTASSIUM SERPL-MCNC: 5 MMOL/L — SIGNIFICANT CHANGE UP (ref 3.5–5.3)
POTASSIUM SERPL-MCNC: 5.2 MMOL/L — SIGNIFICANT CHANGE UP (ref 3.5–5.3)
POTASSIUM SERPL-MCNC: 5.8 MMOL/L — HIGH (ref 3.5–5.3)
POTASSIUM SERPL-SCNC: 3.4 MMOL/L — LOW (ref 3.5–5.3)
POTASSIUM SERPL-SCNC: 3.6 MMOL/L — SIGNIFICANT CHANGE UP (ref 3.5–5.3)
POTASSIUM SERPL-SCNC: 3.8 MMOL/L — SIGNIFICANT CHANGE UP (ref 3.5–5.3)
POTASSIUM SERPL-SCNC: 3.8 MMOL/L — SIGNIFICANT CHANGE UP (ref 3.5–5.3)
POTASSIUM SERPL-SCNC: 3.9 MMOL/L — SIGNIFICANT CHANGE UP (ref 3.5–5.3)
POTASSIUM SERPL-SCNC: 4 MMOL/L — SIGNIFICANT CHANGE UP (ref 3.5–5.3)
POTASSIUM SERPL-SCNC: 4.1 MMOL/L — SIGNIFICANT CHANGE UP (ref 3.5–5.3)
POTASSIUM SERPL-SCNC: 4.3 MMOL/L — SIGNIFICANT CHANGE UP (ref 3.5–5.3)
POTASSIUM SERPL-SCNC: 5 MMOL/L — SIGNIFICANT CHANGE UP (ref 3.5–5.3)
POTASSIUM SERPL-SCNC: 5.2 MMOL/L — SIGNIFICANT CHANGE UP (ref 3.5–5.3)
POTASSIUM SERPL-SCNC: 5.8 MMOL/L — HIGH (ref 3.5–5.3)
PROT SERPL-MCNC: 6.4 G/DL — SIGNIFICANT CHANGE UP (ref 6–8.3)
PROT SERPL-MCNC: 6.5 G/DL — SIGNIFICANT CHANGE UP (ref 6–8.3)
PROT SERPL-MCNC: 7.8 G/DL — SIGNIFICANT CHANGE UP (ref 6–8.3)
PROT UR-MCNC: ABNORMAL
PROT UR-MCNC: ABNORMAL
PROTHROM AB SERPL-ACNC: 14 SEC — HIGH (ref 10–13.1)
PROTHROM AB SERPL-ACNC: 14.4 SEC — HIGH (ref 10–13.1)
PROTHROM AB SERPL-ACNC: 14.4 SEC — HIGH (ref 10–13.1)
PROTHROM AB SERPL-ACNC: 14.8 SEC — HIGH (ref 10–13.1)
PROTHROM AB SERPL-ACNC: 15.1 SEC — HIGH (ref 10–13.1)
QUANT TB PLUS MITOGEN MINUS NIL: 0.85 IU/ML — SIGNIFICANT CHANGE UP
RBC # BLD: 2.62 M/UL — LOW (ref 4.2–5.8)
RBC # BLD: 2.92 M/UL — LOW (ref 4.2–5.8)
RBC # BLD: 2.98 M/UL — LOW (ref 4.2–5.8)
RBC # BLD: 3.09 M/UL — LOW (ref 4.2–5.8)
RBC # BLD: 3.11 M/UL — LOW (ref 4.2–5.8)
RBC # BLD: 3.14 M/UL — LOW (ref 4.2–5.8)
RBC # BLD: 3.17 M/UL — LOW (ref 4.2–5.8)
RBC # BLD: 3.21 M/UL — LOW (ref 4.2–5.8)
RBC # BLD: 3.28 M/UL — LOW (ref 4.2–5.8)
RBC # BLD: 3.38 M/UL — LOW (ref 4.2–5.8)
RBC # BLD: 3.47 M/UL — LOW (ref 4.2–5.8)
RBC # BLD: 3.59 M/UL — LOW (ref 4.2–5.8)
RBC # BLD: 3.68 M/UL — LOW (ref 4.2–5.8)
RBC # BLD: 3.81 M/UL — LOW (ref 4.2–5.8)
RBC # BLD: 3.89 M/UL — LOW (ref 4.2–5.8)
RBC # FLD: 14.2 % — SIGNIFICANT CHANGE UP (ref 10.3–14.5)
RBC # FLD: 14.7 % — HIGH (ref 10.3–14.5)
RBC # FLD: 14.8 % — HIGH (ref 10.3–14.5)
RBC # FLD: 14.8 % — HIGH (ref 10.3–14.5)
RBC # FLD: 14.9 % — HIGH (ref 10.3–14.5)
RBC # FLD: 14.9 % — HIGH (ref 10.3–14.5)
RBC # FLD: 15 % — HIGH (ref 10.3–14.5)
RBC # FLD: 15 % — HIGH (ref 10.3–14.5)
RBC # FLD: 15.1 % — HIGH (ref 10.3–14.5)
RBC # FLD: 15.1 % — HIGH (ref 10.3–14.5)
RBC # FLD: 15.2 % — HIGH (ref 10.3–14.5)
RBC # FLD: 15.2 % — HIGH (ref 10.3–14.5)
RBC # FLD: 15.4 % — HIGH (ref 10.3–14.5)
RBC # FLD: 15.5 % — HIGH (ref 10.3–14.5)
RBC # FLD: 15.9 % — HIGH (ref 10.3–14.5)
RBC BLD AUTO: ABNORMAL
RBC CASTS # UR COMP ASSIST: 9 /HPF — HIGH (ref 0–4)
RH IG SCN BLD-IMP: POSITIVE — SIGNIFICANT CHANGE UP
RH IG SCN BLD-IMP: POSITIVE — SIGNIFICANT CHANGE UP
SAO2 % BLDV: 26 % — LOW (ref 67–88)
SODIUM SERPL-SCNC: 134 MMOL/L — LOW (ref 135–145)
SODIUM SERPL-SCNC: 135 MMOL/L — SIGNIFICANT CHANGE UP (ref 135–145)
SODIUM SERPL-SCNC: 138 MMOL/L — SIGNIFICANT CHANGE UP (ref 135–145)
SODIUM SERPL-SCNC: 138 MMOL/L — SIGNIFICANT CHANGE UP (ref 135–145)
SODIUM SERPL-SCNC: 139 MMOL/L — SIGNIFICANT CHANGE UP (ref 135–145)
SODIUM SERPL-SCNC: 140 MMOL/L — SIGNIFICANT CHANGE UP (ref 135–145)
SODIUM SERPL-SCNC: 140 MMOL/L — SIGNIFICANT CHANGE UP (ref 135–145)
SODIUM SERPL-SCNC: 141 MMOL/L — SIGNIFICANT CHANGE UP (ref 135–145)
SODIUM SERPL-SCNC: 142 MMOL/L — SIGNIFICANT CHANGE UP (ref 135–145)
SODIUM SERPL-SCNC: 142 MMOL/L — SIGNIFICANT CHANGE UP (ref 135–145)
SODIUM SERPL-SCNC: 143 MMOL/L — SIGNIFICANT CHANGE UP (ref 135–145)
SODIUM UR-SCNC: 103 MMOL/L — SIGNIFICANT CHANGE UP
SODIUM UR-SCNC: 23 MMOL/L — SIGNIFICANT CHANGE UP
SP GR SPEC: 1.01 — SIGNIFICANT CHANGE UP (ref 1.01–1.02)
SP GR SPEC: 1.03 — HIGH (ref 1.01–1.02)
SPECIMEN SOURCE: SIGNIFICANT CHANGE UP
T PALLIDUM AB TITR SER: NEGATIVE — SIGNIFICANT CHANGE UP
T4 FREE SERPL-MCNC: 0.7 NG/DL — LOW (ref 0.9–1.8)
THC UR QL: NEGATIVE — SIGNIFICANT CHANGE UP
TIBC SERPL-MCNC: 115 UG/DL — LOW (ref 220–430)
TOTAL CHOLESTEROL/HDL RATIO MEASUREMENT: 5 RATIO — SIGNIFICANT CHANGE UP (ref 3.4–9.6)
TRIGL SERPL-MCNC: 132 MG/DL — SIGNIFICANT CHANGE UP (ref 10–149)
TROPONIN T, HIGH SENSITIVITY RESULT: 393 NG/L — HIGH (ref 0–51)
TROPONIN T, HIGH SENSITIVITY RESULT: 464 NG/L — HIGH (ref 0–51)
TROPONIN T, HIGH SENSITIVITY RESULT: 488 NG/L — HIGH (ref 0–51)
TSH SERPL-MCNC: 29.4 UIU/ML — HIGH (ref 0.27–4.2)
TSH SERPL-MCNC: 30 UIU/ML — HIGH (ref 0.27–4.2)
UIBC SERPL-MCNC: 96 UG/DL — LOW (ref 110–370)
UROBILINOGEN FLD QL: NEGATIVE — SIGNIFICANT CHANGE UP
UROBILINOGEN FLD QL: SIGNIFICANT CHANGE UP
UUN UR-MCNC: 128 MG/DL — SIGNIFICANT CHANGE UP
UUN UR-MCNC: 229 MG/DL — SIGNIFICANT CHANGE UP
VANCOMYCIN FLD-MCNC: 10 UG/ML — SIGNIFICANT CHANGE UP
VANCOMYCIN FLD-MCNC: 17.1 UG/ML — SIGNIFICANT CHANGE UP
VANCOMYCIN FLD-MCNC: 20.7 UG/ML — SIGNIFICANT CHANGE UP
VANCOMYCIN FLD-MCNC: 26.5 UG/ML
VANCOMYCIN FLD-MCNC: 31.8 UG/ML
VANCOMYCIN TROUGH SERPL-MCNC: 15.6 UG/ML — SIGNIFICANT CHANGE UP (ref 10–20)
VANCOMYCIN TROUGH SERPL-MCNC: 16.2 UG/ML — SIGNIFICANT CHANGE UP (ref 10–20)
VANCOMYCIN TROUGH SERPL-MCNC: 18.1 UG/ML — SIGNIFICANT CHANGE UP (ref 10–20)
VANCOMYCIN TROUGH SERPL-MCNC: 21 UG/ML — HIGH (ref 10–20)
VANCOMYCIN TROUGH SERPL-MCNC: 21.3 UG/ML — HIGH (ref 10–20)
VANCOMYCIN TROUGH SERPL-MCNC: 28 UG/ML — CRITICAL HIGH (ref 10–20)
VIT B12 SERPL-MCNC: 885 PG/ML — SIGNIFICANT CHANGE UP (ref 232–1245)
VIT B12 SERPL-MCNC: 895 PG/ML — SIGNIFICANT CHANGE UP (ref 232–1245)
WBC # BLD: 10.6 K/UL — HIGH (ref 3.8–10.5)
WBC # BLD: 3.04 K/UL — LOW (ref 3.8–10.5)
WBC # BLD: 3.31 K/UL — LOW (ref 3.8–10.5)
WBC # BLD: 3.35 K/UL — LOW (ref 3.8–10.5)
WBC # BLD: 3.82 K/UL — SIGNIFICANT CHANGE UP (ref 3.8–10.5)
WBC # BLD: 4.37 K/UL — SIGNIFICANT CHANGE UP (ref 3.8–10.5)
WBC # BLD: 5.4 K/UL — SIGNIFICANT CHANGE UP (ref 3.8–10.5)
WBC # BLD: 5.43 K/UL — SIGNIFICANT CHANGE UP (ref 3.8–10.5)
WBC # BLD: 5.71 K/UL — SIGNIFICANT CHANGE UP (ref 3.8–10.5)
WBC # BLD: 6.24 K/UL — SIGNIFICANT CHANGE UP (ref 3.8–10.5)
WBC # BLD: 6.95 K/UL — SIGNIFICANT CHANGE UP (ref 3.8–10.5)
WBC # BLD: 7.69 K/UL — SIGNIFICANT CHANGE UP (ref 3.8–10.5)
WBC # BLD: 7.98 K/UL — SIGNIFICANT CHANGE UP (ref 3.8–10.5)
WBC # BLD: 8.8 K/UL — SIGNIFICANT CHANGE UP (ref 3.8–10.5)
WBC # BLD: 9.7 K/UL — SIGNIFICANT CHANGE UP (ref 3.8–10.5)
WBC # FLD AUTO: 10.6 K/UL — HIGH (ref 3.8–10.5)
WBC # FLD AUTO: 3.04 K/UL — LOW (ref 3.8–10.5)
WBC # FLD AUTO: 3.31 K/UL — LOW (ref 3.8–10.5)
WBC # FLD AUTO: 3.35 K/UL — LOW (ref 3.8–10.5)
WBC # FLD AUTO: 3.82 K/UL — SIGNIFICANT CHANGE UP (ref 3.8–10.5)
WBC # FLD AUTO: 4.37 K/UL — SIGNIFICANT CHANGE UP (ref 3.8–10.5)
WBC # FLD AUTO: 5.4 K/UL — SIGNIFICANT CHANGE UP (ref 3.8–10.5)
WBC # FLD AUTO: 5.43 K/UL — SIGNIFICANT CHANGE UP (ref 3.8–10.5)
WBC # FLD AUTO: 5.71 K/UL — SIGNIFICANT CHANGE UP (ref 3.8–10.5)
WBC # FLD AUTO: 6.24 K/UL — SIGNIFICANT CHANGE UP (ref 3.8–10.5)
WBC # FLD AUTO: 6.95 K/UL — SIGNIFICANT CHANGE UP (ref 3.8–10.5)
WBC # FLD AUTO: 7.69 K/UL — SIGNIFICANT CHANGE UP (ref 3.8–10.5)
WBC # FLD AUTO: 7.98 K/UL — SIGNIFICANT CHANGE UP (ref 3.8–10.5)
WBC # FLD AUTO: 8.8 K/UL — SIGNIFICANT CHANGE UP (ref 3.8–10.5)
WBC # FLD AUTO: 9.7 K/UL — SIGNIFICANT CHANGE UP (ref 3.8–10.5)
WBC UR QL: 2 /HPF — SIGNIFICANT CHANGE UP (ref 0–5)

## 2019-01-01 PROCEDURE — 87040 BLOOD CULTURE FOR BACTERIA: CPT

## 2019-01-01 PROCEDURE — 71045 X-RAY EXAM CHEST 1 VIEW: CPT

## 2019-01-01 PROCEDURE — 93010 ELECTROCARDIOGRAM REPORT: CPT

## 2019-01-01 PROCEDURE — 99222 1ST HOSP IP/OBS MODERATE 55: CPT

## 2019-01-01 PROCEDURE — 87389 HIV-1 AG W/HIV-1&-2 AB AG IA: CPT

## 2019-01-01 PROCEDURE — 70547 MR ANGIOGRAPHY NECK W/O DYE: CPT

## 2019-01-01 PROCEDURE — 84540 ASSAY OF URINE/UREA-N: CPT

## 2019-01-01 PROCEDURE — 99497 ADVNCD CARE PLAN 30 MIN: CPT | Mod: 25

## 2019-01-01 PROCEDURE — 99233 SBSQ HOSP IP/OBS HIGH 50: CPT | Mod: GC

## 2019-01-01 PROCEDURE — 99223 1ST HOSP IP/OBS HIGH 75: CPT

## 2019-01-01 PROCEDURE — 70544 MR ANGIOGRAPHY HEAD W/O DYE: CPT

## 2019-01-01 PROCEDURE — 99232 SBSQ HOSP IP/OBS MODERATE 35: CPT

## 2019-01-01 PROCEDURE — 93306 TTE W/DOPPLER COMPLETE: CPT | Mod: 26

## 2019-01-01 PROCEDURE — 80061 LIPID PANEL: CPT

## 2019-01-01 PROCEDURE — 97116 GAIT TRAINING THERAPY: CPT

## 2019-01-01 PROCEDURE — 85730 THROMBOPLASTIN TIME PARTIAL: CPT

## 2019-01-01 PROCEDURE — 86780 TREPONEMA PALLIDUM: CPT

## 2019-01-01 PROCEDURE — 86480 TB TEST CELL IMMUN MEASURE: CPT

## 2019-01-01 PROCEDURE — 12345: CPT | Mod: NC

## 2019-01-01 PROCEDURE — 97166 OT EVAL MOD COMPLEX 45 MIN: CPT

## 2019-01-01 PROCEDURE — 83605 ASSAY OF LACTIC ACID: CPT

## 2019-01-01 PROCEDURE — 86850 RBC ANTIBODY SCREEN: CPT

## 2019-01-01 PROCEDURE — 93312 ECHO TRANSESOPHAGEAL: CPT

## 2019-01-01 PROCEDURE — 93306 TTE W/DOPPLER COMPLETE: CPT

## 2019-01-01 PROCEDURE — 83540 ASSAY OF IRON: CPT

## 2019-01-01 PROCEDURE — 86638 Q FEVER ANTIBODY: CPT

## 2019-01-01 PROCEDURE — 71250 CT THORAX DX C-: CPT | Mod: 26

## 2019-01-01 PROCEDURE — 86664 EPSTEIN-BARR NUCLEAR ANTIGEN: CPT

## 2019-01-01 PROCEDURE — G0515: CPT

## 2019-01-01 PROCEDURE — 83735 ASSAY OF MAGNESIUM: CPT

## 2019-01-01 PROCEDURE — 86665 EPSTEIN-BARR CAPSID VCA: CPT

## 2019-01-01 PROCEDURE — 82570 ASSAY OF URINE CREATININE: CPT

## 2019-01-01 PROCEDURE — 99254 IP/OBS CNSLTJ NEW/EST MOD 60: CPT

## 2019-01-01 PROCEDURE — 97530 THERAPEUTIC ACTIVITIES: CPT

## 2019-01-01 PROCEDURE — 81001 URINALYSIS AUTO W/SCOPE: CPT

## 2019-01-01 PROCEDURE — 80307 DRUG TEST PRSMV CHEM ANLYZR: CPT

## 2019-01-01 PROCEDURE — 85014 HEMATOCRIT: CPT

## 2019-01-01 PROCEDURE — 86747 PARVOVIRUS ANTIBODY: CPT

## 2019-01-01 PROCEDURE — 82435 ASSAY OF BLOOD CHLORIDE: CPT

## 2019-01-01 PROCEDURE — 70551 MRI BRAIN STEM W/O DYE: CPT | Mod: 26

## 2019-01-01 PROCEDURE — 99233 SBSQ HOSP IP/OBS HIGH 50: CPT

## 2019-01-01 PROCEDURE — 97110 THERAPEUTIC EXERCISES: CPT

## 2019-01-01 PROCEDURE — 74176 CT ABD & PELVIS W/O CONTRAST: CPT | Mod: 26

## 2019-01-01 PROCEDURE — 96365 THER/PROPH/DIAG IV INF INIT: CPT

## 2019-01-01 PROCEDURE — 84100 ASSAY OF PHOSPHORUS: CPT

## 2019-01-01 PROCEDURE — 80048 BASIC METABOLIC PNL TOTAL CA: CPT

## 2019-01-01 PROCEDURE — 93320 DOPPLER ECHO COMPLETE: CPT | Mod: 26

## 2019-01-01 PROCEDURE — 82565 ASSAY OF CREATININE: CPT

## 2019-01-01 PROCEDURE — 85027 COMPLETE CBC AUTOMATED: CPT

## 2019-01-01 PROCEDURE — 87116 MYCOBACTERIA CULTURE: CPT

## 2019-01-01 PROCEDURE — 99255 IP/OBS CONSLTJ NEW/EST HI 80: CPT

## 2019-01-01 PROCEDURE — 97112 NEUROMUSCULAR REEDUCATION: CPT

## 2019-01-01 PROCEDURE — 71045 X-RAY EXAM CHEST 1 VIEW: CPT | Mod: 26

## 2019-01-01 PROCEDURE — 83036 HEMOGLOBIN GLYCOSYLATED A1C: CPT

## 2019-01-01 PROCEDURE — 93005 ELECTROCARDIOGRAM TRACING: CPT

## 2019-01-01 PROCEDURE — 82607 VITAMIN B-12: CPT

## 2019-01-01 PROCEDURE — 84300 ASSAY OF URINE SODIUM: CPT

## 2019-01-01 PROCEDURE — 82728 ASSAY OF FERRITIN: CPT

## 2019-01-01 PROCEDURE — 70450 CT HEAD/BRAIN W/O DYE: CPT

## 2019-01-01 PROCEDURE — 84443 ASSAY THYROID STIM HORMONE: CPT

## 2019-01-01 PROCEDURE — 85610 PROTHROMBIN TIME: CPT

## 2019-01-01 PROCEDURE — 70450 CT HEAD/BRAIN W/O DYE: CPT | Mod: 26

## 2019-01-01 PROCEDURE — 86611 BARTONELLA ANTIBODY: CPT

## 2019-01-01 PROCEDURE — 84484 ASSAY OF TROPONIN QUANT: CPT

## 2019-01-01 PROCEDURE — 84439 ASSAY OF FREE THYROXINE: CPT

## 2019-01-01 PROCEDURE — 97161 PT EVAL LOW COMPLEX 20 MIN: CPT

## 2019-01-01 PROCEDURE — 82962 GLUCOSE BLOOD TEST: CPT

## 2019-01-01 PROCEDURE — 86803 HEPATITIS C AB TEST: CPT

## 2019-01-01 PROCEDURE — 86645 CMV ANTIBODY IGM: CPT

## 2019-01-01 PROCEDURE — 86900 BLOOD TYPING SEROLOGIC ABO: CPT

## 2019-01-01 PROCEDURE — 71250 CT THORAX DX C-: CPT

## 2019-01-01 PROCEDURE — 82550 ASSAY OF CK (CPK): CPT

## 2019-01-01 PROCEDURE — 93320 DOPPLER ECHO COMPLETE: CPT

## 2019-01-01 PROCEDURE — 82746 ASSAY OF FOLIC ACID SERUM: CPT

## 2019-01-01 PROCEDURE — 99281 EMR DPT VST MAYX REQ PHY/QHP: CPT | Mod: 24

## 2019-01-01 PROCEDURE — 80053 COMPREHEN METABOLIC PANEL: CPT

## 2019-01-01 PROCEDURE — 86713 LEGIONELLA ANTIBODY: CPT

## 2019-01-01 PROCEDURE — 93325 DOPPLER ECHO COLOR FLOW MAPG: CPT | Mod: 26

## 2019-01-01 PROCEDURE — 93325 DOPPLER ECHO COLOR FLOW MAPG: CPT

## 2019-01-01 PROCEDURE — 82330 ASSAY OF CALCIUM: CPT

## 2019-01-01 PROCEDURE — 99255 IP/OBS CONSLTJ NEW/EST HI 80: CPT | Mod: GC

## 2019-01-01 PROCEDURE — 99285 EMERGENCY DEPT VISIT HI MDM: CPT | Mod: 25

## 2019-01-01 PROCEDURE — 80202 ASSAY OF VANCOMYCIN: CPT

## 2019-01-01 PROCEDURE — 74176 CT ABD & PELVIS W/O CONTRAST: CPT

## 2019-01-01 PROCEDURE — 82803 BLOOD GASES ANY COMBINATION: CPT

## 2019-01-01 PROCEDURE — 83550 IRON BINDING TEST: CPT

## 2019-01-01 PROCEDURE — 70547 MR ANGIOGRAPHY NECK W/O DYE: CPT | Mod: 26

## 2019-01-01 PROCEDURE — 84132 ASSAY OF SERUM POTASSIUM: CPT

## 2019-01-01 PROCEDURE — 82947 ASSAY GLUCOSE BLOOD QUANT: CPT

## 2019-01-01 PROCEDURE — 82553 CREATINE MB FRACTION: CPT

## 2019-01-01 PROCEDURE — 86622 BRUCELLA ANTIBODY: CPT

## 2019-01-01 PROCEDURE — 93312 ECHO TRANSESOPHAGEAL: CPT | Mod: 26

## 2019-01-01 PROCEDURE — 86901 BLOOD TYPING SEROLOGIC RH(D): CPT

## 2019-01-01 PROCEDURE — 84295 ASSAY OF SERUM SODIUM: CPT

## 2019-01-01 PROCEDURE — 99232 SBSQ HOSP IP/OBS MODERATE 35: CPT | Mod: GC

## 2019-01-01 PROCEDURE — 86631 CHLAMYDIA ANTIBODY: CPT

## 2019-01-01 PROCEDURE — 87086 URINE CULTURE/COLONY COUNT: CPT

## 2019-01-01 PROCEDURE — 86644 CMV ANTIBODY: CPT

## 2019-01-01 PROCEDURE — 86663 EPSTEIN-BARR ANTIBODY: CPT

## 2019-01-01 PROCEDURE — 96361 HYDRATE IV INFUSION ADD-ON: CPT

## 2019-01-01 PROCEDURE — 86632 CHLAMYDIA IGM ANTIBODY: CPT

## 2019-01-01 PROCEDURE — 70551 MRI BRAIN STEM W/O DYE: CPT

## 2019-01-01 RX ORDER — MEROPENEM 1 G/30ML
500 INJECTION INTRAVENOUS EVERY 24 HOURS
Qty: 0 | Refills: 0 | Status: DISCONTINUED | OUTPATIENT
Start: 2019-01-01 | End: 2019-01-01

## 2019-01-01 RX ORDER — SODIUM CHLORIDE 9 MG/ML
1000 INJECTION, SOLUTION INTRAVENOUS ONCE
Qty: 0 | Refills: 0 | Status: COMPLETED | OUTPATIENT
Start: 2019-01-01 | End: 2019-01-01

## 2019-01-01 RX ORDER — VANCOMYCIN HCL 1 G
1000 VIAL (EA) INTRAVENOUS EVERY 24 HOURS
Qty: 0 | Refills: 0 | Status: DISCONTINUED | OUTPATIENT
Start: 2019-01-01 | End: 2019-01-01

## 2019-01-01 RX ORDER — HYDROMORPHONE HYDROCHLORIDE 2 MG/ML
0.3 INJECTION INTRAMUSCULAR; INTRAVENOUS; SUBCUTANEOUS
Qty: 0 | Refills: 0 | Status: DISCONTINUED | OUTPATIENT
Start: 2019-01-01 | End: 2019-01-01

## 2019-01-01 RX ORDER — ACETAMINOPHEN 500 MG
650 TABLET ORAL EVERY 6 HOURS
Qty: 0 | Refills: 0 | Status: DISCONTINUED | OUTPATIENT
Start: 2019-01-01 | End: 2019-01-01

## 2019-01-01 RX ORDER — ISOSORBIDE DINITRATE 5 MG/1
10 TABLET ORAL THREE TIMES A DAY
Qty: 0 | Refills: 0 | Status: DISCONTINUED | OUTPATIENT
Start: 2019-01-01 | End: 2019-01-01

## 2019-01-01 RX ORDER — LEVETIRACETAM 250 MG/1
1 TABLET, FILM COATED ORAL
Qty: 0 | Refills: 0 | COMMUNITY

## 2019-01-01 RX ORDER — HYDROMORPHONE HYDROCHLORIDE 2 MG/ML
0.5 INJECTION INTRAMUSCULAR; INTRAVENOUS; SUBCUTANEOUS
Qty: 0 | Refills: 0 | Status: DISCONTINUED | OUTPATIENT
Start: 2019-01-01 | End: 2019-01-01

## 2019-01-01 RX ORDER — OXYCODONE HYDROCHLORIDE 5 MG/1
5 TABLET ORAL ONCE
Qty: 0 | Refills: 0 | Status: DISCONTINUED | OUTPATIENT
Start: 2019-01-01 | End: 2019-01-01

## 2019-01-01 RX ORDER — CARVEDILOL PHOSPHATE 80 MG/1
12.5 CAPSULE, EXTENDED RELEASE ORAL ONCE
Qty: 0 | Refills: 0 | Status: COMPLETED | OUTPATIENT
Start: 2019-01-01 | End: 2019-01-01

## 2019-01-01 RX ORDER — ASPIRIN/CALCIUM CARB/MAGNESIUM 324 MG
81 TABLET ORAL DAILY
Qty: 0 | Refills: 0 | Status: DISCONTINUED | OUTPATIENT
Start: 2019-01-01 | End: 2019-01-01

## 2019-01-01 RX ORDER — ATORVASTATIN CALCIUM 80 MG/1
40 TABLET, FILM COATED ORAL AT BEDTIME
Qty: 0 | Refills: 0 | Status: DISCONTINUED | OUTPATIENT
Start: 2019-01-01 | End: 2019-01-01

## 2019-01-01 RX ORDER — ACETAMINOPHEN 500 MG
650 TABLET ORAL ONCE
Qty: 0 | Refills: 0 | Status: COMPLETED | OUTPATIENT
Start: 2019-01-01 | End: 2019-01-01

## 2019-01-01 RX ORDER — ONDANSETRON 8 MG/1
4 TABLET, FILM COATED ORAL ONCE
Qty: 0 | Refills: 0 | Status: COMPLETED | OUTPATIENT
Start: 2019-01-01 | End: 2019-01-01

## 2019-01-01 RX ORDER — CEFAZOLIN SODIUM 1 G
1000 VIAL (EA) INJECTION EVERY 8 HOURS
Qty: 0 | Refills: 0 | Status: DISCONTINUED | OUTPATIENT
Start: 2019-01-01 | End: 2019-01-01

## 2019-01-01 RX ORDER — SODIUM CHLORIDE 9 MG/ML
500 INJECTION, SOLUTION INTRAVENOUS ONCE
Qty: 0 | Refills: 0 | Status: COMPLETED | OUTPATIENT
Start: 2019-01-01 | End: 2019-01-01

## 2019-01-01 RX ORDER — MAGNESIUM SULFATE 500 MG/ML
2 VIAL (ML) INJECTION ONCE
Qty: 0 | Refills: 0 | Status: COMPLETED | OUTPATIENT
Start: 2019-01-01 | End: 2019-01-01

## 2019-01-01 RX ORDER — CHLORHEXIDINE GLUCONATE 213 G/1000ML
1 SOLUTION TOPICAL
Qty: 0 | Refills: 0 | Status: DISCONTINUED | OUTPATIENT
Start: 2019-01-01 | End: 2019-01-01

## 2019-01-01 RX ORDER — METOPROLOL TARTRATE 50 MG
25 TABLET ORAL EVERY 12 HOURS
Qty: 0 | Refills: 0 | Status: DISCONTINUED | OUTPATIENT
Start: 2019-01-01 | End: 2019-01-01

## 2019-01-01 RX ORDER — CEFAZOLIN SODIUM 1 G
1000 VIAL (EA) INJECTION ONCE
Qty: 0 | Refills: 0 | Status: COMPLETED | OUTPATIENT
Start: 2019-01-01 | End: 2019-01-01

## 2019-01-01 RX ORDER — HYDROMORPHONE HYDROCHLORIDE 2 MG/ML
1 INJECTION INTRAMUSCULAR; INTRAVENOUS; SUBCUTANEOUS
Qty: 100 | Refills: 0 | Status: DISCONTINUED | OUTPATIENT
Start: 2019-01-01 | End: 2019-01-01

## 2019-01-01 RX ORDER — LEVETIRACETAM 250 MG/1
500 TABLET, FILM COATED ORAL
Qty: 0 | Refills: 0 | Status: DISCONTINUED | OUTPATIENT
Start: 2019-01-01 | End: 2019-01-01

## 2019-01-01 RX ORDER — CEFEPIME 1 G/1
2000 INJECTION, POWDER, FOR SOLUTION INTRAMUSCULAR; INTRAVENOUS EVERY 24 HOURS
Qty: 0 | Refills: 0 | Status: DISCONTINUED | OUTPATIENT
Start: 2019-01-01 | End: 2019-01-01

## 2019-01-01 RX ORDER — VANCOMYCIN HCL 1 G
250 VIAL (EA) INTRAVENOUS ONCE
Qty: 0 | Refills: 0 | Status: COMPLETED | OUTPATIENT
Start: 2019-01-01 | End: 2019-01-01

## 2019-01-01 RX ORDER — VANCOMYCIN HCL 1 G
1000 VIAL (EA) INTRAVENOUS ONCE
Qty: 0 | Refills: 0 | Status: COMPLETED | OUTPATIENT
Start: 2019-01-01 | End: 2019-01-01

## 2019-01-01 RX ORDER — POTASSIUM CHLORIDE 20 MEQ
40 PACKET (EA) ORAL ONCE
Qty: 0 | Refills: 0 | Status: COMPLETED | OUTPATIENT
Start: 2019-01-01 | End: 2019-01-01

## 2019-01-01 RX ORDER — SODIUM CHLORIDE 9 MG/ML
1000 INJECTION, SOLUTION INTRAVENOUS
Qty: 0 | Refills: 0 | Status: DISCONTINUED | OUTPATIENT
Start: 2019-01-01 | End: 2019-01-01

## 2019-01-01 RX ORDER — LEVOTHYROXINE SODIUM 125 MCG
200 TABLET ORAL DAILY
Qty: 0 | Refills: 0 | Status: DISCONTINUED | OUTPATIENT
Start: 2019-01-01 | End: 2019-01-01

## 2019-01-01 RX ORDER — POTASSIUM PHOSPHATE, MONOBASIC POTASSIUM PHOSPHATE, DIBASIC 236; 224 MG/ML; MG/ML
15 INJECTION, SOLUTION INTRAVENOUS ONCE
Qty: 0 | Refills: 0 | Status: DISCONTINUED | OUTPATIENT
Start: 2019-01-01 | End: 2019-01-01

## 2019-01-01 RX ORDER — ATORVASTATIN CALCIUM 80 MG/1
1 TABLET, FILM COATED ORAL
Qty: 0 | Refills: 0 | COMMUNITY

## 2019-01-01 RX ORDER — SODIUM CHLORIDE 9 MG/ML
250 INJECTION INTRAMUSCULAR; INTRAVENOUS; SUBCUTANEOUS ONCE
Qty: 0 | Refills: 0 | Status: COMPLETED | OUTPATIENT
Start: 2019-01-01 | End: 2019-01-01

## 2019-01-01 RX ORDER — FAMOTIDINE 10 MG/ML
40 INJECTION INTRAVENOUS ONCE
Qty: 0 | Refills: 0 | Status: COMPLETED | OUTPATIENT
Start: 2019-01-01 | End: 2019-01-01

## 2019-01-01 RX ORDER — HYDROMORPHONE HYDROCHLORIDE 2 MG/ML
1.5 INJECTION INTRAMUSCULAR; INTRAVENOUS; SUBCUTANEOUS
Qty: 0 | Refills: 0 | Status: DISCONTINUED | OUTPATIENT
Start: 2019-01-01 | End: 2019-01-01

## 2019-01-01 RX ORDER — CHLORHEXIDINE GLUCONATE 213 G/1000ML
1 SOLUTION TOPICAL EVERY 12 HOURS
Qty: 0 | Refills: 0 | Status: DISCONTINUED | OUTPATIENT
Start: 2019-01-01 | End: 2019-01-01

## 2019-01-01 RX ORDER — CEFTRIAXONE 500 MG/1
2 INJECTION, POWDER, FOR SOLUTION INTRAMUSCULAR; INTRAVENOUS ONCE
Qty: 0 | Refills: 0 | Status: COMPLETED | OUTPATIENT
Start: 2019-01-01 | End: 2019-01-01

## 2019-01-01 RX ORDER — TAMSULOSIN HYDROCHLORIDE 0.4 MG/1
1 CAPSULE ORAL
Qty: 0 | Refills: 0 | COMMUNITY

## 2019-01-01 RX ORDER — CARVEDILOL PHOSPHATE 80 MG/1
12.5 CAPSULE, EXTENDED RELEASE ORAL EVERY 12 HOURS
Qty: 0 | Refills: 0 | Status: DISCONTINUED | OUTPATIENT
Start: 2019-01-01 | End: 2019-01-01

## 2019-01-01 RX ORDER — HYDROMORPHONE HYDROCHLORIDE 2 MG/ML
0.2 INJECTION INTRAMUSCULAR; INTRAVENOUS; SUBCUTANEOUS
Qty: 100 | Refills: 0 | Status: DISCONTINUED | OUTPATIENT
Start: 2019-01-01 | End: 2019-01-01

## 2019-01-01 RX ORDER — HYDROMORPHONE HYDROCHLORIDE 2 MG/ML
2 INJECTION INTRAMUSCULAR; INTRAVENOUS; SUBCUTANEOUS
Qty: 0 | Refills: 0 | Status: DISCONTINUED | OUTPATIENT
Start: 2019-01-01 | End: 2019-01-01

## 2019-01-01 RX ORDER — HYDROMORPHONE HYDROCHLORIDE 2 MG/ML
0.5 INJECTION INTRAMUSCULAR; INTRAVENOUS; SUBCUTANEOUS
Qty: 100 | Refills: 0 | Status: DISCONTINUED | OUTPATIENT
Start: 2019-01-01 | End: 2019-01-01

## 2019-01-01 RX ORDER — ACETAMINOPHEN 500 MG
1000 TABLET ORAL ONCE
Qty: 0 | Refills: 0 | Status: COMPLETED | OUTPATIENT
Start: 2019-01-01 | End: 2019-01-01

## 2019-01-01 RX ORDER — VANCOMYCIN HCL 1 G
VIAL (EA) INTRAVENOUS
Qty: 0 | Refills: 0 | Status: DISCONTINUED | OUTPATIENT
Start: 2019-01-01 | End: 2019-01-01

## 2019-01-01 RX ORDER — HYDROMORPHONE HYDROCHLORIDE 2 MG/ML
0.2 INJECTION INTRAMUSCULAR; INTRAVENOUS; SUBCUTANEOUS EVERY 4 HOURS
Qty: 0 | Refills: 0 | Status: DISCONTINUED | OUTPATIENT
Start: 2019-01-01 | End: 2019-01-01

## 2019-01-01 RX ORDER — CEFAZOLIN SODIUM 1 G
VIAL (EA) INJECTION
Qty: 0 | Refills: 0 | Status: DISCONTINUED | OUTPATIENT
Start: 2019-01-01 | End: 2019-01-01

## 2019-01-01 RX ORDER — TAMSULOSIN HYDROCHLORIDE 0.4 MG/1
0.4 CAPSULE ORAL AT BEDTIME
Qty: 0 | Refills: 0 | Status: DISCONTINUED | OUTPATIENT
Start: 2019-01-01 | End: 2019-01-01

## 2019-01-01 RX ORDER — HYDROMORPHONE HYDROCHLORIDE 2 MG/ML
1 INJECTION INTRAMUSCULAR; INTRAVENOUS; SUBCUTANEOUS
Qty: 0 | Refills: 0 | Status: DISCONTINUED | OUTPATIENT
Start: 2019-01-01 | End: 2019-01-01

## 2019-01-01 RX ORDER — VANCOMYCIN HCL 1 G
500 VIAL (EA) INTRAVENOUS ONCE
Qty: 0 | Refills: 0 | Status: COMPLETED | OUTPATIENT
Start: 2019-01-01 | End: 2019-01-01

## 2019-01-01 RX ORDER — OXYCODONE AND ACETAMINOPHEN 5; 325 MG/1; MG/1
1 TABLET ORAL ONCE
Qty: 0 | Refills: 0 | Status: DISCONTINUED | OUTPATIENT
Start: 2019-01-01 | End: 2019-01-01

## 2019-01-01 RX ORDER — HYDRALAZINE HCL 50 MG
1 TABLET ORAL
Qty: 0 | Refills: 0 | COMMUNITY

## 2019-01-01 RX ORDER — CEFEPIME 1 G/1
1000 INJECTION, POWDER, FOR SOLUTION INTRAMUSCULAR; INTRAVENOUS DAILY
Qty: 0 | Refills: 0 | Status: DISCONTINUED | OUTPATIENT
Start: 2019-01-01 | End: 2019-01-01

## 2019-01-01 RX ORDER — MORPHINE SULFATE 50 MG/1
1 CAPSULE, EXTENDED RELEASE ORAL
Qty: 0 | Refills: 0 | Status: DISCONTINUED | OUTPATIENT
Start: 2019-01-01 | End: 2019-01-01

## 2019-01-01 RX ORDER — LEVETIRACETAM 250 MG/1
500 TABLET, FILM COATED ORAL EVERY 12 HOURS
Qty: 0 | Refills: 0 | Status: DISCONTINUED | OUTPATIENT
Start: 2019-01-01 | End: 2019-01-01

## 2019-01-01 RX ORDER — HYDRALAZINE HCL 50 MG
10 TABLET ORAL ONCE
Qty: 0 | Refills: 0 | Status: COMPLETED | OUTPATIENT
Start: 2019-01-01 | End: 2019-01-01

## 2019-01-01 RX ORDER — LEVETIRACETAM 250 MG/1
500 TABLET, FILM COATED ORAL ONCE
Qty: 0 | Refills: 0 | Status: COMPLETED | OUTPATIENT
Start: 2019-01-01 | End: 2019-01-01

## 2019-01-01 RX ORDER — SENNA PLUS 8.6 MG/1
1 TABLET ORAL
Qty: 0 | Refills: 0 | COMMUNITY

## 2019-01-01 RX ORDER — SENNA PLUS 8.6 MG/1
2 TABLET ORAL AT BEDTIME
Qty: 0 | Refills: 0 | Status: DISCONTINUED | OUTPATIENT
Start: 2019-01-01 | End: 2019-01-01

## 2019-01-01 RX ORDER — ALTEPLASE 100 MG
2 KIT INTRAVENOUS ONCE
Qty: 0 | Refills: 0 | Status: COMPLETED | OUTPATIENT
Start: 2019-01-01 | End: 2019-01-01

## 2019-01-01 RX ORDER — CEFEPIME 1 G/1
1000 INJECTION, POWDER, FOR SOLUTION INTRAMUSCULAR; INTRAVENOUS EVERY 24 HOURS
Qty: 0 | Refills: 0 | Status: DISCONTINUED | OUTPATIENT
Start: 2019-01-01 | End: 2019-01-01

## 2019-01-01 RX ORDER — SODIUM CHLORIDE 9 MG/ML
1000 INJECTION INTRAMUSCULAR; INTRAVENOUS; SUBCUTANEOUS
Qty: 0 | Refills: 0 | Status: DISCONTINUED | OUTPATIENT
Start: 2019-01-01 | End: 2019-01-01

## 2019-01-01 RX ORDER — LANOLIN ALCOHOL/MO/W.PET/CERES
3 CREAM (GRAM) TOPICAL AT BEDTIME
Qty: 0 | Refills: 0 | Status: DISCONTINUED | OUTPATIENT
Start: 2019-01-01 | End: 2019-01-01

## 2019-01-01 RX ORDER — SODIUM CHLORIDE 9 MG/ML
500 INJECTION INTRAMUSCULAR; INTRAVENOUS; SUBCUTANEOUS ONCE
Qty: 0 | Refills: 0 | Status: COMPLETED | OUTPATIENT
Start: 2019-01-01 | End: 2019-01-01

## 2019-01-01 RX ORDER — LEVOTHYROXINE SODIUM 125 MCG
1 TABLET ORAL
Qty: 0 | Refills: 0 | COMMUNITY

## 2019-01-01 RX ORDER — CEFTRIAXONE 500 MG/1
1 INJECTION, POWDER, FOR SOLUTION INTRAMUSCULAR; INTRAVENOUS
Qty: 0 | Refills: 0 | COMMUNITY

## 2019-01-01 RX ORDER — ISOSORBIDE MONONITRATE 60 MG/1
10 TABLET, EXTENDED RELEASE ORAL ONCE
Qty: 0 | Refills: 0 | Status: COMPLETED | OUTPATIENT
Start: 2019-01-01 | End: 2019-01-01

## 2019-01-01 RX ORDER — CARVEDILOL PHOSPHATE 80 MG/1
1 CAPSULE, EXTENDED RELEASE ORAL
Qty: 0 | Refills: 0 | COMMUNITY

## 2019-01-01 RX ORDER — VANCOMYCIN HCL 1 G
750 VIAL (EA) INTRAVENOUS EVERY 24 HOURS
Qty: 0 | Refills: 0 | Status: DISCONTINUED | OUTPATIENT
Start: 2019-01-01 | End: 2019-01-01

## 2019-01-01 RX ORDER — HYDRALAZINE HCL 50 MG
10 TABLET ORAL THREE TIMES A DAY
Qty: 0 | Refills: 0 | Status: DISCONTINUED | OUTPATIENT
Start: 2019-01-01 | End: 2019-01-01

## 2019-01-01 RX ORDER — POTASSIUM CHLORIDE 20 MEQ
40 PACKET (EA) ORAL ONCE
Qty: 0 | Refills: 0 | Status: DISCONTINUED | OUTPATIENT
Start: 2019-01-01 | End: 2019-01-01

## 2019-01-01 RX ORDER — HYDROMORPHONE HYDROCHLORIDE 2 MG/ML
0.7 INJECTION INTRAMUSCULAR; INTRAVENOUS; SUBCUTANEOUS
Qty: 100 | Refills: 0 | Status: DISCONTINUED | OUTPATIENT
Start: 2019-01-01 | End: 2019-01-01

## 2019-01-01 RX ORDER — LEVOTHYROXINE SODIUM 125 MCG
112 TABLET ORAL DAILY
Qty: 0 | Refills: 0 | Status: DISCONTINUED | OUTPATIENT
Start: 2019-01-01 | End: 2019-01-01

## 2019-01-01 RX ORDER — ROBINUL 0.2 MG/ML
0.4 INJECTION INTRAMUSCULAR; INTRAVENOUS EVERY 6 HOURS
Qty: 0 | Refills: 0 | Status: DISCONTINUED | OUTPATIENT
Start: 2019-01-01 | End: 2019-01-01

## 2019-01-01 RX ORDER — METOPROLOL TARTRATE 50 MG
50 TABLET ORAL
Qty: 0 | Refills: 0 | Status: DISCONTINUED | OUTPATIENT
Start: 2019-01-01 | End: 2019-01-01

## 2019-01-01 RX ORDER — SODIUM BICARBONATE 1 MEQ/ML
650 SYRINGE (ML) INTRAVENOUS
Qty: 0 | Refills: 0 | Status: DISCONTINUED | OUTPATIENT
Start: 2019-01-01 | End: 2019-01-01

## 2019-01-01 RX ORDER — ISOSORBIDE DINITRATE 5 MG/1
1 TABLET ORAL
Qty: 0 | Refills: 0 | COMMUNITY

## 2019-01-01 RX ORDER — SODIUM BICARBONATE 1 MEQ/ML
1 SYRINGE (ML) INTRAVENOUS
Qty: 0 | Refills: 0 | COMMUNITY

## 2019-01-01 RX ORDER — CEFTRIAXONE 500 MG/1
2 INJECTION, POWDER, FOR SOLUTION INTRAMUSCULAR; INTRAVENOUS EVERY 24 HOURS
Qty: 0 | Refills: 0 | Status: DISCONTINUED | OUTPATIENT
Start: 2019-01-01 | End: 2019-01-01

## 2019-01-01 RX ADMIN — CEFEPIME 100 MILLIGRAM(S): 1 INJECTION, POWDER, FOR SOLUTION INTRAMUSCULAR; INTRAVENOUS at 19:28

## 2019-01-01 RX ADMIN — Medication 650 MILLIGRAM(S): at 10:00

## 2019-01-01 RX ADMIN — Medication 650 MILLIGRAM(S): at 17:25

## 2019-01-01 RX ADMIN — ATORVASTATIN CALCIUM 40 MILLIGRAM(S): 80 TABLET, FILM COATED ORAL at 21:05

## 2019-01-01 RX ADMIN — Medication 250 MILLIGRAM(S): at 08:21

## 2019-01-01 RX ADMIN — Medication 100 MILLIGRAM(S): at 09:06

## 2019-01-01 RX ADMIN — Medication 200 MICROGRAM(S): at 06:54

## 2019-01-01 RX ADMIN — SENNA PLUS 2 TABLET(S): 8.6 TABLET ORAL at 21:21

## 2019-01-01 RX ADMIN — HYDROMORPHONE HYDROCHLORIDE 0.2 MILLIGRAM(S): 2 INJECTION INTRAMUSCULAR; INTRAVENOUS; SUBCUTANEOUS at 13:55

## 2019-01-01 RX ADMIN — Medication 20 MILLIGRAM(S): at 11:12

## 2019-01-01 RX ADMIN — Medication 650 MILLIGRAM(S): at 06:15

## 2019-01-01 RX ADMIN — Medication 10 MILLIGRAM(S): at 05:15

## 2019-01-01 RX ADMIN — ISOSORBIDE DINITRATE 10 MILLIGRAM(S): 5 TABLET ORAL at 21:18

## 2019-01-01 RX ADMIN — LEVETIRACETAM 400 MILLIGRAM(S): 250 TABLET, FILM COATED ORAL at 06:00

## 2019-01-01 RX ADMIN — HYDROMORPHONE HYDROCHLORIDE 0.5 MILLIGRAM(S): 2 INJECTION INTRAMUSCULAR; INTRAVENOUS; SUBCUTANEOUS at 13:59

## 2019-01-01 RX ADMIN — SODIUM CHLORIDE 10 MILLILITER(S): 9 INJECTION INTRAMUSCULAR; INTRAVENOUS; SUBCUTANEOUS at 07:31

## 2019-01-01 RX ADMIN — ISOSORBIDE DINITRATE 10 MILLIGRAM(S): 5 TABLET ORAL at 21:57

## 2019-01-01 RX ADMIN — CEFEPIME 100 MILLIGRAM(S): 1 INJECTION, POWDER, FOR SOLUTION INTRAMUSCULAR; INTRAVENOUS at 20:21

## 2019-01-01 RX ADMIN — Medication 10 MILLIGRAM(S): at 21:20

## 2019-01-01 RX ADMIN — Medication 3 MILLIGRAM(S): at 21:33

## 2019-01-01 RX ADMIN — Medication 10 MILLIGRAM(S): at 22:03

## 2019-01-01 RX ADMIN — Medication 100 MILLIGRAM(S): at 22:13

## 2019-01-01 RX ADMIN — Medication 0.5 MILLIGRAM(S): at 05:16

## 2019-01-01 RX ADMIN — Medication 200 MICROGRAM(S): at 05:20

## 2019-01-01 RX ADMIN — CEFTRIAXONE 2 GRAM(S): 500 INJECTION, POWDER, FOR SOLUTION INTRAMUSCULAR; INTRAVENOUS at 00:08

## 2019-01-01 RX ADMIN — HYDROMORPHONE HYDROCHLORIDE 0.5 MILLIGRAM(S): 2 INJECTION INTRAMUSCULAR; INTRAVENOUS; SUBCUTANEOUS at 05:08

## 2019-01-01 RX ADMIN — Medication 1 MILLIGRAM(S): at 10:53

## 2019-01-01 RX ADMIN — Medication 10 MILLIGRAM(S): at 21:02

## 2019-01-01 RX ADMIN — HYDROMORPHONE HYDROCHLORIDE 0.2 MILLIGRAM(S): 2 INJECTION INTRAMUSCULAR; INTRAVENOUS; SUBCUTANEOUS at 17:44

## 2019-01-01 RX ADMIN — CHLORHEXIDINE GLUCONATE 1 APPLICATION(S): 213 SOLUTION TOPICAL at 17:36

## 2019-01-01 RX ADMIN — Medication 650 MILLIGRAM(S): at 05:19

## 2019-01-01 RX ADMIN — Medication 10 MILLIGRAM(S): at 21:05

## 2019-01-01 RX ADMIN — Medication 650 MILLIGRAM(S): at 18:16

## 2019-01-01 RX ADMIN — LEVETIRACETAM 400 MILLIGRAM(S): 250 TABLET, FILM COATED ORAL at 05:20

## 2019-01-01 RX ADMIN — Medication 100 MILLIGRAM(S): at 16:12

## 2019-01-01 RX ADMIN — Medication 650 MILLIGRAM(S): at 17:29

## 2019-01-01 RX ADMIN — HYDROMORPHONE HYDROCHLORIDE 0.2 MILLIGRAM(S): 2 INJECTION INTRAMUSCULAR; INTRAVENOUS; SUBCUTANEOUS at 05:10

## 2019-01-01 RX ADMIN — Medication 10 MILLIGRAM(S): at 05:18

## 2019-01-01 RX ADMIN — HYDROMORPHONE HYDROCHLORIDE 0.5 MILLIGRAM(S): 2 INJECTION INTRAMUSCULAR; INTRAVENOUS; SUBCUTANEOUS at 10:00

## 2019-01-01 RX ADMIN — HYDROMORPHONE HYDROCHLORIDE 0.3 MILLIGRAM(S): 2 INJECTION INTRAMUSCULAR; INTRAVENOUS; SUBCUTANEOUS at 07:54

## 2019-01-01 RX ADMIN — HYDROMORPHONE HYDROCHLORIDE 0.5 MILLIGRAM(S): 2 INJECTION INTRAMUSCULAR; INTRAVENOUS; SUBCUTANEOUS at 02:00

## 2019-01-01 RX ADMIN — Medication 3 MILLIGRAM(S): at 22:04

## 2019-01-01 RX ADMIN — Medication 10 MILLIGRAM(S): at 13:24

## 2019-01-01 RX ADMIN — LEVETIRACETAM 500 MILLIGRAM(S): 250 TABLET, FILM COATED ORAL at 06:10

## 2019-01-01 RX ADMIN — ISOSORBIDE MONONITRATE 10 MILLIGRAM(S): 60 TABLET, EXTENDED RELEASE ORAL at 23:57

## 2019-01-01 RX ADMIN — Medication 10 MILLIGRAM(S): at 06:09

## 2019-01-01 RX ADMIN — CARVEDILOL PHOSPHATE 12.5 MILLIGRAM(S): 80 CAPSULE, EXTENDED RELEASE ORAL at 18:04

## 2019-01-01 RX ADMIN — Medication 250 MILLIGRAM(S): at 05:20

## 2019-01-01 RX ADMIN — CHLORHEXIDINE GLUCONATE 1 APPLICATION(S): 213 SOLUTION TOPICAL at 18:31

## 2019-01-01 RX ADMIN — CHLORHEXIDINE GLUCONATE 1 APPLICATION(S): 213 SOLUTION TOPICAL at 17:16

## 2019-01-01 RX ADMIN — CHLORHEXIDINE GLUCONATE 1 APPLICATION(S): 213 SOLUTION TOPICAL at 18:08

## 2019-01-01 RX ADMIN — HYDROMORPHONE HYDROCHLORIDE 0.2 MILLIGRAM(S): 2 INJECTION INTRAMUSCULAR; INTRAVENOUS; SUBCUTANEOUS at 05:16

## 2019-01-01 RX ADMIN — CARVEDILOL PHOSPHATE 12.5 MILLIGRAM(S): 80 CAPSULE, EXTENDED RELEASE ORAL at 08:25

## 2019-01-01 RX ADMIN — SODIUM CHLORIDE 1000 MILLILITER(S): 9 INJECTION, SOLUTION INTRAVENOUS at 07:42

## 2019-01-01 RX ADMIN — HYDROMORPHONE HYDROCHLORIDE 0.2 MILLIGRAM(S): 2 INJECTION INTRAMUSCULAR; INTRAVENOUS; SUBCUTANEOUS at 18:31

## 2019-01-01 RX ADMIN — LEVETIRACETAM 400 MILLIGRAM(S): 250 TABLET, FILM COATED ORAL at 17:16

## 2019-01-01 RX ADMIN — ISOSORBIDE DINITRATE 10 MILLIGRAM(S): 5 TABLET ORAL at 05:20

## 2019-01-01 RX ADMIN — SENNA PLUS 2 TABLET(S): 8.6 TABLET ORAL at 21:56

## 2019-01-01 RX ADMIN — CARVEDILOL PHOSPHATE 12.5 MILLIGRAM(S): 80 CAPSULE, EXTENDED RELEASE ORAL at 21:02

## 2019-01-01 RX ADMIN — HYDROMORPHONE HYDROCHLORIDE 0.2 MILLIGRAM(S): 2 INJECTION INTRAMUSCULAR; INTRAVENOUS; SUBCUTANEOUS at 18:07

## 2019-01-01 RX ADMIN — Medication 10 MILLIGRAM(S): at 23:57

## 2019-01-01 RX ADMIN — SODIUM CHLORIDE 1000 MILLILITER(S): 9 INJECTION, SOLUTION INTRAVENOUS at 23:44

## 2019-01-01 RX ADMIN — Medication 0.5 MILLIGRAM(S): at 04:49

## 2019-01-01 RX ADMIN — Medication 10 MILLIGRAM(S): at 21:37

## 2019-01-01 RX ADMIN — HYDROMORPHONE HYDROCHLORIDE 0.2 MILLIGRAM(S): 2 INJECTION INTRAMUSCULAR; INTRAVENOUS; SUBCUTANEOUS at 14:30

## 2019-01-01 RX ADMIN — ISOSORBIDE DINITRATE 10 MILLIGRAM(S): 5 TABLET ORAL at 22:13

## 2019-01-01 RX ADMIN — LEVETIRACETAM 400 MILLIGRAM(S): 250 TABLET, FILM COATED ORAL at 17:09

## 2019-01-01 RX ADMIN — HYDROMORPHONE HYDROCHLORIDE 0.2 MILLIGRAM(S): 2 INJECTION INTRAMUSCULAR; INTRAVENOUS; SUBCUTANEOUS at 10:48

## 2019-01-01 RX ADMIN — HYDROMORPHONE HYDROCHLORIDE 0.5 MILLIGRAM(S): 2 INJECTION INTRAMUSCULAR; INTRAVENOUS; SUBCUTANEOUS at 14:15

## 2019-01-01 RX ADMIN — ONDANSETRON 4 MILLIGRAM(S): 8 TABLET, FILM COATED ORAL at 22:38

## 2019-01-01 RX ADMIN — LEVETIRACETAM 400 MILLIGRAM(S): 250 TABLET, FILM COATED ORAL at 05:05

## 2019-01-01 RX ADMIN — HYDROMORPHONE HYDROCHLORIDE 0.2 MILLIGRAM(S): 2 INJECTION INTRAMUSCULAR; INTRAVENOUS; SUBCUTANEOUS at 02:35

## 2019-01-01 RX ADMIN — Medication 10 MILLIGRAM(S): at 08:25

## 2019-01-01 RX ADMIN — Medication 650 MILLIGRAM(S): at 05:15

## 2019-01-01 RX ADMIN — Medication 200 MICROGRAM(S): at 06:39

## 2019-01-01 RX ADMIN — CARVEDILOL PHOSPHATE 12.5 MILLIGRAM(S): 80 CAPSULE, EXTENDED RELEASE ORAL at 18:03

## 2019-01-01 RX ADMIN — CEFEPIME 100 MILLIGRAM(S): 1 INJECTION, POWDER, FOR SOLUTION INTRAMUSCULAR; INTRAVENOUS at 14:49

## 2019-01-01 RX ADMIN — Medication 112 MICROGRAM(S): at 08:25

## 2019-01-01 RX ADMIN — CHLORHEXIDINE GLUCONATE 1 APPLICATION(S): 213 SOLUTION TOPICAL at 08:19

## 2019-01-01 RX ADMIN — Medication 100 MILLIGRAM(S): at 05:16

## 2019-01-01 RX ADMIN — HYDROMORPHONE HYDROCHLORIDE 0.3 MILLIGRAM(S): 2 INJECTION INTRAMUSCULAR; INTRAVENOUS; SUBCUTANEOUS at 20:58

## 2019-01-01 RX ADMIN — Medication 650 MILLIGRAM(S): at 08:55

## 2019-01-01 RX ADMIN — Medication 10 MILLIGRAM(S): at 13:22

## 2019-01-01 RX ADMIN — LEVETIRACETAM 400 MILLIGRAM(S): 250 TABLET, FILM COATED ORAL at 17:13

## 2019-01-01 RX ADMIN — Medication 20 MILLIGRAM(S): at 06:29

## 2019-01-01 RX ADMIN — SODIUM CHLORIDE 500 MILLILITER(S): 9 INJECTION INTRAMUSCULAR; INTRAVENOUS; SUBCUTANEOUS at 13:07

## 2019-01-01 RX ADMIN — CHLORHEXIDINE GLUCONATE 1 APPLICATION(S): 213 SOLUTION TOPICAL at 08:55

## 2019-01-01 RX ADMIN — HYDROMORPHONE HYDROCHLORIDE 0.2 MILLIGRAM(S): 2 INJECTION INTRAMUSCULAR; INTRAVENOUS; SUBCUTANEOUS at 21:01

## 2019-01-01 RX ADMIN — Medication 1 MILLIGRAM(S): at 17:16

## 2019-01-01 RX ADMIN — Medication 200 MICROGRAM(S): at 06:10

## 2019-01-01 RX ADMIN — CHLORHEXIDINE GLUCONATE 1 APPLICATION(S): 213 SOLUTION TOPICAL at 21:09

## 2019-01-01 RX ADMIN — Medication 25 MILLIGRAM(S): at 05:24

## 2019-01-01 RX ADMIN — HYDROMORPHONE HYDROCHLORIDE 0.2 MILLIGRAM(S): 2 INJECTION INTRAMUSCULAR; INTRAVENOUS; SUBCUTANEOUS at 21:55

## 2019-01-01 RX ADMIN — Medication 650 MILLIGRAM(S): at 18:07

## 2019-01-01 RX ADMIN — CARVEDILOL PHOSPHATE 12.5 MILLIGRAM(S): 80 CAPSULE, EXTENDED RELEASE ORAL at 05:20

## 2019-01-01 RX ADMIN — Medication 10 MILLIGRAM(S): at 05:04

## 2019-01-01 RX ADMIN — CHLORHEXIDINE GLUCONATE 1 APPLICATION(S): 213 SOLUTION TOPICAL at 10:54

## 2019-01-01 RX ADMIN — HYDROMORPHONE HYDROCHLORIDE 0.3 MILLIGRAM(S): 2 INJECTION INTRAMUSCULAR; INTRAVENOUS; SUBCUTANEOUS at 16:21

## 2019-01-01 RX ADMIN — SENNA PLUS 2 TABLET(S): 8.6 TABLET ORAL at 21:16

## 2019-01-01 RX ADMIN — TAMSULOSIN HYDROCHLORIDE 0.4 MILLIGRAM(S): 0.4 CAPSULE ORAL at 21:57

## 2019-01-01 RX ADMIN — Medication 112 MICROGRAM(S): at 06:10

## 2019-01-01 RX ADMIN — ISOSORBIDE DINITRATE 10 MILLIGRAM(S): 5 TABLET ORAL at 05:15

## 2019-01-01 RX ADMIN — FAMOTIDINE 40 MILLIGRAM(S): 10 INJECTION INTRAVENOUS at 22:37

## 2019-01-01 RX ADMIN — SENNA PLUS 2 TABLET(S): 8.6 TABLET ORAL at 22:14

## 2019-01-01 RX ADMIN — Medication 100 MILLIGRAM(S): at 11:48

## 2019-01-01 RX ADMIN — SODIUM CHLORIDE 1000 MILLILITER(S): 9 INJECTION, SOLUTION INTRAVENOUS at 00:12

## 2019-01-01 RX ADMIN — Medication 1 MILLIGRAM(S): at 08:30

## 2019-01-01 RX ADMIN — HYDROMORPHONE HYDROCHLORIDE 1 MG/HR: 2 INJECTION INTRAMUSCULAR; INTRAVENOUS; SUBCUTANEOUS at 19:39

## 2019-01-01 RX ADMIN — CARVEDILOL PHOSPHATE 12.5 MILLIGRAM(S): 80 CAPSULE, EXTENDED RELEASE ORAL at 06:38

## 2019-01-01 RX ADMIN — ISOSORBIDE DINITRATE 10 MILLIGRAM(S): 5 TABLET ORAL at 13:24

## 2019-01-01 RX ADMIN — CARVEDILOL PHOSPHATE 12.5 MILLIGRAM(S): 80 CAPSULE, EXTENDED RELEASE ORAL at 06:28

## 2019-01-01 RX ADMIN — Medication 650 MILLIGRAM(S): at 06:45

## 2019-01-01 RX ADMIN — ISOSORBIDE DINITRATE 10 MILLIGRAM(S): 5 TABLET ORAL at 06:53

## 2019-01-01 RX ADMIN — Medication 10 MILLIGRAM(S): at 06:15

## 2019-01-01 RX ADMIN — SENNA PLUS 2 TABLET(S): 8.6 TABLET ORAL at 21:33

## 2019-01-01 RX ADMIN — HYDROMORPHONE HYDROCHLORIDE 0.2 MILLIGRAM(S): 2 INJECTION INTRAMUSCULAR; INTRAVENOUS; SUBCUTANEOUS at 14:29

## 2019-01-01 RX ADMIN — ISOSORBIDE DINITRATE 10 MILLIGRAM(S): 5 TABLET ORAL at 06:38

## 2019-01-01 RX ADMIN — Medication 200 MICROGRAM(S): at 05:15

## 2019-01-01 RX ADMIN — Medication 20 MILLIGRAM(S): at 05:20

## 2019-01-01 RX ADMIN — CARVEDILOL PHOSPHATE 12.5 MILLIGRAM(S): 80 CAPSULE, EXTENDED RELEASE ORAL at 05:15

## 2019-01-01 RX ADMIN — Medication 650 MILLIGRAM(S): at 05:20

## 2019-01-01 RX ADMIN — SODIUM CHLORIDE 30 MILLILITER(S): 9 INJECTION, SOLUTION INTRAVENOUS at 10:54

## 2019-01-01 RX ADMIN — Medication 10 MILLIGRAM(S): at 13:51

## 2019-01-01 RX ADMIN — Medication 40 MILLIEQUIVALENT(S): at 10:11

## 2019-01-01 RX ADMIN — LEVETIRACETAM 500 MILLIGRAM(S): 250 TABLET, FILM COATED ORAL at 08:25

## 2019-01-01 RX ADMIN — LEVETIRACETAM 500 MILLIGRAM(S): 250 TABLET, FILM COATED ORAL at 05:20

## 2019-01-01 RX ADMIN — ISOSORBIDE DINITRATE 10 MILLIGRAM(S): 5 TABLET ORAL at 08:25

## 2019-01-01 RX ADMIN — HYDROMORPHONE HYDROCHLORIDE 1 MILLIGRAM(S): 2 INJECTION INTRAMUSCULAR; INTRAVENOUS; SUBCUTANEOUS at 07:11

## 2019-01-01 RX ADMIN — CEFEPIME 100 MILLIGRAM(S): 1 INJECTION, POWDER, FOR SOLUTION INTRAMUSCULAR; INTRAVENOUS at 17:22

## 2019-01-01 RX ADMIN — Medication 0.5 MILLIGRAM(S): at 07:34

## 2019-01-01 RX ADMIN — ISOSORBIDE DINITRATE 10 MILLIGRAM(S): 5 TABLET ORAL at 22:03

## 2019-01-01 RX ADMIN — ATORVASTATIN CALCIUM 40 MILLIGRAM(S): 80 TABLET, FILM COATED ORAL at 21:56

## 2019-01-01 RX ADMIN — LEVETIRACETAM 500 MILLIGRAM(S): 250 TABLET, FILM COATED ORAL at 11:11

## 2019-01-01 RX ADMIN — LEVETIRACETAM 500 MILLIGRAM(S): 250 TABLET, FILM COATED ORAL at 06:38

## 2019-01-01 RX ADMIN — ISOSORBIDE DINITRATE 10 MILLIGRAM(S): 5 TABLET ORAL at 05:04

## 2019-01-01 RX ADMIN — SODIUM CHLORIDE 500 MILLILITER(S): 9 INJECTION INTRAMUSCULAR; INTRAVENOUS; SUBCUTANEOUS at 11:39

## 2019-01-01 RX ADMIN — SENNA PLUS 2 TABLET(S): 8.6 TABLET ORAL at 21:18

## 2019-01-01 RX ADMIN — CHLORHEXIDINE GLUCONATE 1 APPLICATION(S): 213 SOLUTION TOPICAL at 17:09

## 2019-01-01 RX ADMIN — Medication 10 MILLIGRAM(S): at 06:53

## 2019-01-01 RX ADMIN — Medication 20 MILLIGRAM(S): at 08:26

## 2019-01-01 RX ADMIN — TAMSULOSIN HYDROCHLORIDE 0.4 MILLIGRAM(S): 0.4 CAPSULE ORAL at 21:18

## 2019-01-01 RX ADMIN — CHLORHEXIDINE GLUCONATE 1 APPLICATION(S): 213 SOLUTION TOPICAL at 11:39

## 2019-01-01 RX ADMIN — ATORVASTATIN CALCIUM 40 MILLIGRAM(S): 80 TABLET, FILM COATED ORAL at 21:16

## 2019-01-01 RX ADMIN — CEFTRIAXONE 100 GRAM(S): 500 INJECTION, POWDER, FOR SOLUTION INTRAMUSCULAR; INTRAVENOUS at 23:11

## 2019-01-01 RX ADMIN — Medication 50 GRAM(S): at 10:11

## 2019-01-01 RX ADMIN — TAMSULOSIN HYDROCHLORIDE 0.4 MILLIGRAM(S): 0.4 CAPSULE ORAL at 21:33

## 2019-01-01 RX ADMIN — Medication 20 MILLIGRAM(S): at 06:39

## 2019-01-01 RX ADMIN — SENNA PLUS 2 TABLET(S): 8.6 TABLET ORAL at 22:03

## 2019-01-01 RX ADMIN — LEVETIRACETAM 400 MILLIGRAM(S): 250 TABLET, FILM COATED ORAL at 17:55

## 2019-01-01 RX ADMIN — HYDROMORPHONE HYDROCHLORIDE 0.2 MG/HR: 2 INJECTION INTRAMUSCULAR; INTRAVENOUS; SUBCUTANEOUS at 10:16

## 2019-01-01 RX ADMIN — CARVEDILOL PHOSPHATE 12.5 MILLIGRAM(S): 80 CAPSULE, EXTENDED RELEASE ORAL at 18:16

## 2019-01-01 RX ADMIN — Medication 200 MICROGRAM(S): at 05:04

## 2019-01-01 RX ADMIN — Medication 650 MILLIGRAM(S): at 01:41

## 2019-01-01 RX ADMIN — LEVETIRACETAM 500 MILLIGRAM(S): 250 TABLET, FILM COATED ORAL at 18:03

## 2019-01-01 RX ADMIN — Medication 650 MILLIGRAM(S): at 18:04

## 2019-01-01 RX ADMIN — SODIUM CHLORIDE 125 MILLILITER(S): 9 INJECTION INTRAMUSCULAR; INTRAVENOUS; SUBCUTANEOUS at 02:00

## 2019-01-01 RX ADMIN — Medication 650 MILLIGRAM(S): at 08:26

## 2019-01-01 RX ADMIN — ATORVASTATIN CALCIUM 40 MILLIGRAM(S): 80 TABLET, FILM COATED ORAL at 21:02

## 2019-01-01 RX ADMIN — ISOSORBIDE DINITRATE 10 MILLIGRAM(S): 5 TABLET ORAL at 13:39

## 2019-01-01 RX ADMIN — Medication 62.5 MILLIMOLE(S): at 17:42

## 2019-01-01 RX ADMIN — Medication 650 MILLIGRAM(S): at 17:16

## 2019-01-01 RX ADMIN — HYDROMORPHONE HYDROCHLORIDE 0.5 MILLIGRAM(S): 2 INJECTION INTRAMUSCULAR; INTRAVENOUS; SUBCUTANEOUS at 22:48

## 2019-01-01 RX ADMIN — Medication 650 MILLIGRAM(S): at 06:28

## 2019-01-01 RX ADMIN — ISOSORBIDE DINITRATE 10 MILLIGRAM(S): 5 TABLET ORAL at 21:37

## 2019-01-01 RX ADMIN — HYDROMORPHONE HYDROCHLORIDE 1 MILLIGRAM(S): 2 INJECTION INTRAMUSCULAR; INTRAVENOUS; SUBCUTANEOUS at 06:53

## 2019-01-01 RX ADMIN — MORPHINE SULFATE 1 MILLIGRAM(S): 50 CAPSULE, EXTENDED RELEASE ORAL at 07:58

## 2019-01-01 RX ADMIN — Medication 10 MILLIGRAM(S): at 06:28

## 2019-01-01 RX ADMIN — Medication 100 MILLIGRAM(S): at 14:35

## 2019-01-01 RX ADMIN — CHLORHEXIDINE GLUCONATE 1 APPLICATION(S): 213 SOLUTION TOPICAL at 05:21

## 2019-01-01 RX ADMIN — HYDROMORPHONE HYDROCHLORIDE 0.2 MILLIGRAM(S): 2 INJECTION INTRAMUSCULAR; INTRAVENOUS; SUBCUTANEOUS at 10:34

## 2019-01-01 RX ADMIN — TAMSULOSIN HYDROCHLORIDE 0.4 MILLIGRAM(S): 0.4 CAPSULE ORAL at 21:16

## 2019-01-01 RX ADMIN — LEVETIRACETAM 500 MILLIGRAM(S): 250 TABLET, FILM COATED ORAL at 23:57

## 2019-01-01 RX ADMIN — TAMSULOSIN HYDROCHLORIDE 0.4 MILLIGRAM(S): 0.4 CAPSULE ORAL at 21:37

## 2019-01-01 RX ADMIN — MORPHINE SULFATE 1 MILLIGRAM(S): 50 CAPSULE, EXTENDED RELEASE ORAL at 01:08

## 2019-01-01 RX ADMIN — HYDROMORPHONE HYDROCHLORIDE 1 MILLIGRAM(S): 2 INJECTION INTRAMUSCULAR; INTRAVENOUS; SUBCUTANEOUS at 13:46

## 2019-01-01 RX ADMIN — Medication 10 MILLIGRAM(S): at 21:18

## 2019-01-01 RX ADMIN — TAMSULOSIN HYDROCHLORIDE 0.4 MILLIGRAM(S): 0.4 CAPSULE ORAL at 21:21

## 2019-01-01 RX ADMIN — SODIUM CHLORIDE 10 MILLILITER(S): 9 INJECTION INTRAMUSCULAR; INTRAVENOUS; SUBCUTANEOUS at 19:35

## 2019-01-01 RX ADMIN — CARVEDILOL PHOSPHATE 12.5 MILLIGRAM(S): 80 CAPSULE, EXTENDED RELEASE ORAL at 17:19

## 2019-01-01 RX ADMIN — LEVETIRACETAM 500 MILLIGRAM(S): 250 TABLET, FILM COATED ORAL at 05:18

## 2019-01-01 RX ADMIN — LEVETIRACETAM 400 MILLIGRAM(S): 250 TABLET, FILM COATED ORAL at 05:07

## 2019-01-01 RX ADMIN — LEVETIRACETAM 500 MILLIGRAM(S): 250 TABLET, FILM COATED ORAL at 06:28

## 2019-01-01 RX ADMIN — Medication 650 MILLIGRAM(S): at 05:27

## 2019-01-01 RX ADMIN — LEVETIRACETAM 500 MILLIGRAM(S): 250 TABLET, FILM COATED ORAL at 06:09

## 2019-01-01 RX ADMIN — LEVETIRACETAM 400 MILLIGRAM(S): 250 TABLET, FILM COATED ORAL at 17:35

## 2019-01-01 RX ADMIN — HYDROMORPHONE HYDROCHLORIDE 2 MILLIGRAM(S): 2 INJECTION INTRAMUSCULAR; INTRAVENOUS; SUBCUTANEOUS at 22:19

## 2019-01-01 RX ADMIN — ISOSORBIDE DINITRATE 10 MILLIGRAM(S): 5 TABLET ORAL at 06:10

## 2019-01-01 RX ADMIN — HYDROMORPHONE HYDROCHLORIDE 0.5 MILLIGRAM(S): 2 INJECTION INTRAMUSCULAR; INTRAVENOUS; SUBCUTANEOUS at 01:41

## 2019-01-01 RX ADMIN — LEVETIRACETAM 500 MILLIGRAM(S): 250 TABLET, FILM COATED ORAL at 05:04

## 2019-01-01 RX ADMIN — Medication 400 MILLIGRAM(S): at 11:39

## 2019-01-01 RX ADMIN — LEVETIRACETAM 400 MILLIGRAM(S): 250 TABLET, FILM COATED ORAL at 18:07

## 2019-01-01 RX ADMIN — HYDROMORPHONE HYDROCHLORIDE 1 MG/HR: 2 INJECTION INTRAMUSCULAR; INTRAVENOUS; SUBCUTANEOUS at 16:33

## 2019-01-01 RX ADMIN — ISOSORBIDE DINITRATE 10 MILLIGRAM(S): 5 TABLET ORAL at 06:29

## 2019-01-01 RX ADMIN — Medication 40 MILLIEQUIVALENT(S): at 08:19

## 2019-01-01 RX ADMIN — Medication 20 MILLIGRAM(S): at 06:15

## 2019-01-01 RX ADMIN — Medication 200 MICROGRAM(S): at 06:45

## 2019-01-01 RX ADMIN — Medication 650 MILLIGRAM(S): at 18:17

## 2019-01-01 RX ADMIN — TAMSULOSIN HYDROCHLORIDE 0.4 MILLIGRAM(S): 0.4 CAPSULE ORAL at 21:20

## 2019-01-01 RX ADMIN — CARVEDILOL PHOSPHATE 12.5 MILLIGRAM(S): 80 CAPSULE, EXTENDED RELEASE ORAL at 06:16

## 2019-01-01 RX ADMIN — HYDROMORPHONE HYDROCHLORIDE 1 MG/HR: 2 INJECTION INTRAMUSCULAR; INTRAVENOUS; SUBCUTANEOUS at 16:55

## 2019-01-01 RX ADMIN — HYDROMORPHONE HYDROCHLORIDE 0.2 MILLIGRAM(S): 2 INJECTION INTRAMUSCULAR; INTRAVENOUS; SUBCUTANEOUS at 22:13

## 2019-01-01 RX ADMIN — HYDROMORPHONE HYDROCHLORIDE 2 MILLIGRAM(S): 2 INJECTION INTRAMUSCULAR; INTRAVENOUS; SUBCUTANEOUS at 20:43

## 2019-01-01 RX ADMIN — ATORVASTATIN CALCIUM 40 MILLIGRAM(S): 80 TABLET, FILM COATED ORAL at 21:37

## 2019-01-01 RX ADMIN — HYDROMORPHONE HYDROCHLORIDE 0.2 MILLIGRAM(S): 2 INJECTION INTRAMUSCULAR; INTRAVENOUS; SUBCUTANEOUS at 10:26

## 2019-01-01 RX ADMIN — CARVEDILOL PHOSPHATE 12.5 MILLIGRAM(S): 80 CAPSULE, EXTENDED RELEASE ORAL at 06:01

## 2019-01-01 RX ADMIN — Medication 3 MILLIGRAM(S): at 21:20

## 2019-01-01 RX ADMIN — Medication 30 MILLILITER(S): at 23:15

## 2019-01-01 RX ADMIN — HYDROMORPHONE HYDROCHLORIDE 0.2 MILLIGRAM(S): 2 INJECTION INTRAMUSCULAR; INTRAVENOUS; SUBCUTANEOUS at 14:12

## 2019-01-01 RX ADMIN — LEVETIRACETAM 500 MILLIGRAM(S): 250 TABLET, FILM COATED ORAL at 17:29

## 2019-01-01 RX ADMIN — Medication 250 MILLIGRAM(S): at 13:52

## 2019-01-01 RX ADMIN — Medication 400 MILLIGRAM(S): at 11:57

## 2019-01-01 RX ADMIN — CEFEPIME 100 MILLIGRAM(S): 1 INJECTION, POWDER, FOR SOLUTION INTRAMUSCULAR; INTRAVENOUS at 17:25

## 2019-01-01 RX ADMIN — ISOSORBIDE DINITRATE 10 MILLIGRAM(S): 5 TABLET ORAL at 13:04

## 2019-01-01 RX ADMIN — SODIUM CHLORIDE 1000 MILLILITER(S): 9 INJECTION, SOLUTION INTRAVENOUS at 22:31

## 2019-01-01 RX ADMIN — ATORVASTATIN CALCIUM 40 MILLIGRAM(S): 80 TABLET, FILM COATED ORAL at 23:04

## 2019-01-01 RX ADMIN — HYDROMORPHONE HYDROCHLORIDE 1 MILLIGRAM(S): 2 INJECTION INTRAMUSCULAR; INTRAVENOUS; SUBCUTANEOUS at 09:12

## 2019-01-01 RX ADMIN — Medication 250 MILLIGRAM(S): at 08:10

## 2019-01-01 RX ADMIN — MORPHINE SULFATE 1 MILLIGRAM(S): 50 CAPSULE, EXTENDED RELEASE ORAL at 21:40

## 2019-01-01 RX ADMIN — HYDROMORPHONE HYDROCHLORIDE 0.2 MILLIGRAM(S): 2 INJECTION INTRAMUSCULAR; INTRAVENOUS; SUBCUTANEOUS at 05:20

## 2019-01-01 RX ADMIN — Medication 100 MILLIGRAM(S): at 13:51

## 2019-01-01 RX ADMIN — Medication 1000 MILLIGRAM(S): at 12:09

## 2019-01-01 RX ADMIN — SENNA PLUS 2 TABLET(S): 8.6 TABLET ORAL at 21:38

## 2019-01-01 RX ADMIN — Medication 650 MILLIGRAM(S): at 06:10

## 2019-01-01 RX ADMIN — Medication 250 MILLIGRAM(S): at 11:11

## 2019-01-01 RX ADMIN — Medication 10 MILLIGRAM(S): at 13:31

## 2019-01-01 RX ADMIN — HYDROMORPHONE HYDROCHLORIDE 0.2 MILLIGRAM(S): 2 INJECTION INTRAMUSCULAR; INTRAVENOUS; SUBCUTANEOUS at 17:55

## 2019-01-01 RX ADMIN — CHLORHEXIDINE GLUCONATE 1 APPLICATION(S): 213 SOLUTION TOPICAL at 08:58

## 2019-01-01 RX ADMIN — LEVETIRACETAM 400 MILLIGRAM(S): 250 TABLET, FILM COATED ORAL at 18:30

## 2019-01-01 RX ADMIN — HYDROMORPHONE HYDROCHLORIDE 0.3 MILLIGRAM(S): 2 INJECTION INTRAMUSCULAR; INTRAVENOUS; SUBCUTANEOUS at 00:15

## 2019-01-01 RX ADMIN — Medication 3 MILLIGRAM(S): at 21:21

## 2019-01-01 RX ADMIN — CHLORHEXIDINE GLUCONATE 1 APPLICATION(S): 213 SOLUTION TOPICAL at 05:17

## 2019-01-01 RX ADMIN — TAMSULOSIN HYDROCHLORIDE 0.4 MILLIGRAM(S): 0.4 CAPSULE ORAL at 22:13

## 2019-01-01 RX ADMIN — HYDROMORPHONE HYDROCHLORIDE 0.3 MILLIGRAM(S): 2 INJECTION INTRAMUSCULAR; INTRAVENOUS; SUBCUTANEOUS at 12:27

## 2019-01-01 RX ADMIN — Medication 1 MILLIGRAM(S): at 04:16

## 2019-01-01 RX ADMIN — ISOSORBIDE DINITRATE 10 MILLIGRAM(S): 5 TABLET ORAL at 13:51

## 2019-01-01 RX ADMIN — ATORVASTATIN CALCIUM 40 MILLIGRAM(S): 80 TABLET, FILM COATED ORAL at 21:33

## 2019-01-01 RX ADMIN — CHLORHEXIDINE GLUCONATE 1 APPLICATION(S): 213 SOLUTION TOPICAL at 07:58

## 2019-01-01 RX ADMIN — HYDROMORPHONE HYDROCHLORIDE 0.5 MG/HR: 2 INJECTION INTRAMUSCULAR; INTRAVENOUS; SUBCUTANEOUS at 19:34

## 2019-01-01 RX ADMIN — ATORVASTATIN CALCIUM 40 MILLIGRAM(S): 80 TABLET, FILM COATED ORAL at 22:13

## 2019-01-01 RX ADMIN — CARVEDILOL PHOSPHATE 12.5 MILLIGRAM(S): 80 CAPSULE, EXTENDED RELEASE ORAL at 18:17

## 2019-01-01 RX ADMIN — HYDROMORPHONE HYDROCHLORIDE 0.2 MILLIGRAM(S): 2 INJECTION INTRAMUSCULAR; INTRAVENOUS; SUBCUTANEOUS at 02:30

## 2019-01-01 RX ADMIN — Medication 10 MILLIGRAM(S): at 22:13

## 2019-01-01 RX ADMIN — HYDROMORPHONE HYDROCHLORIDE 0.5 MILLIGRAM(S): 2 INJECTION INTRAMUSCULAR; INTRAVENOUS; SUBCUTANEOUS at 09:45

## 2019-01-01 RX ADMIN — HYDROMORPHONE HYDROCHLORIDE 0.5 MILLIGRAM(S): 2 INJECTION INTRAMUSCULAR; INTRAVENOUS; SUBCUTANEOUS at 23:05

## 2019-01-01 RX ADMIN — ISOSORBIDE DINITRATE 10 MILLIGRAM(S): 5 TABLET ORAL at 21:03

## 2019-01-01 RX ADMIN — HYDROMORPHONE HYDROCHLORIDE 1 MILLIGRAM(S): 2 INJECTION INTRAMUSCULAR; INTRAVENOUS; SUBCUTANEOUS at 09:27

## 2019-01-01 RX ADMIN — Medication 25 MILLIGRAM(S): at 17:29

## 2019-01-01 RX ADMIN — ISOSORBIDE DINITRATE 10 MILLIGRAM(S): 5 TABLET ORAL at 13:31

## 2019-01-01 RX ADMIN — SODIUM CHLORIDE 1000 MILLILITER(S): 9 INJECTION, SOLUTION INTRAVENOUS at 02:00

## 2019-01-01 RX ADMIN — Medication 0.5 MILLIGRAM(S): at 11:30

## 2019-01-01 RX ADMIN — LEVETIRACETAM 500 MILLIGRAM(S): 250 TABLET, FILM COATED ORAL at 18:10

## 2019-01-01 RX ADMIN — Medication 1000 MILLIGRAM(S): at 12:27

## 2019-01-01 RX ADMIN — HYDROMORPHONE HYDROCHLORIDE 0.2 MILLIGRAM(S): 2 INJECTION INTRAMUSCULAR; INTRAVENOUS; SUBCUTANEOUS at 22:55

## 2019-01-01 RX ADMIN — Medication 3 MILLIGRAM(S): at 22:03

## 2019-01-01 RX ADMIN — HYDROMORPHONE HYDROCHLORIDE 0.3 MILLIGRAM(S): 2 INJECTION INTRAMUSCULAR; INTRAVENOUS; SUBCUTANEOUS at 07:22

## 2019-01-01 RX ADMIN — HYDROMORPHONE HYDROCHLORIDE 0.2 MILLIGRAM(S): 2 INJECTION INTRAMUSCULAR; INTRAVENOUS; SUBCUTANEOUS at 13:42

## 2019-01-01 RX ADMIN — LEVETIRACETAM 500 MILLIGRAM(S): 250 TABLET, FILM COATED ORAL at 05:15

## 2019-01-01 RX ADMIN — TAMSULOSIN HYDROCHLORIDE 0.4 MILLIGRAM(S): 0.4 CAPSULE ORAL at 21:03

## 2019-01-01 RX ADMIN — HYDROMORPHONE HYDROCHLORIDE 0.3 MILLIGRAM(S): 2 INJECTION INTRAMUSCULAR; INTRAVENOUS; SUBCUTANEOUS at 16:36

## 2019-01-01 RX ADMIN — LEVETIRACETAM 500 MILLIGRAM(S): 250 TABLET, FILM COATED ORAL at 17:16

## 2019-01-01 RX ADMIN — CHLORHEXIDINE GLUCONATE 1 APPLICATION(S): 213 SOLUTION TOPICAL at 05:20

## 2019-01-01 RX ADMIN — CARVEDILOL PHOSPHATE 12.5 MILLIGRAM(S): 80 CAPSULE, EXTENDED RELEASE ORAL at 06:53

## 2019-01-01 RX ADMIN — ATORVASTATIN CALCIUM 40 MILLIGRAM(S): 80 TABLET, FILM COATED ORAL at 22:03

## 2019-01-01 RX ADMIN — Medication 200 MICROGRAM(S): at 06:29

## 2019-01-01 RX ADMIN — TAMSULOSIN HYDROCHLORIDE 0.4 MILLIGRAM(S): 0.4 CAPSULE ORAL at 22:03

## 2019-01-01 RX ADMIN — Medication 10 MILLIGRAM(S): at 06:38

## 2019-01-01 RX ADMIN — ISOSORBIDE DINITRATE 10 MILLIGRAM(S): 5 TABLET ORAL at 23:03

## 2019-01-01 RX ADMIN — LEVETIRACETAM 500 MILLIGRAM(S): 250 TABLET, FILM COATED ORAL at 18:04

## 2019-01-01 RX ADMIN — CEFEPIME 100 MILLIGRAM(S): 1 INJECTION, POWDER, FOR SOLUTION INTRAMUSCULAR; INTRAVENOUS at 16:24

## 2019-01-01 RX ADMIN — CHLORHEXIDINE GLUCONATE 1 APPLICATION(S): 213 SOLUTION TOPICAL at 08:27

## 2019-01-01 RX ADMIN — Medication 0.5 MILLIGRAM(S): at 16:23

## 2019-01-01 RX ADMIN — MORPHINE SULFATE 1 MILLIGRAM(S): 50 CAPSULE, EXTENDED RELEASE ORAL at 08:28

## 2019-01-01 RX ADMIN — Medication 50 MILLIGRAM(S): at 17:17

## 2019-01-01 RX ADMIN — Medication 10 MILLIGRAM(S): at 21:57

## 2019-01-01 RX ADMIN — LEVETIRACETAM 500 MILLIGRAM(S): 250 TABLET, FILM COATED ORAL at 17:15

## 2019-01-01 RX ADMIN — SENNA PLUS 2 TABLET(S): 8.6 TABLET ORAL at 21:05

## 2019-01-01 RX ADMIN — CEFEPIME 100 MILLIGRAM(S): 1 INJECTION, POWDER, FOR SOLUTION INTRAMUSCULAR; INTRAVENOUS at 17:54

## 2019-01-01 RX ADMIN — CARVEDILOL PHOSPHATE 12.5 MILLIGRAM(S): 80 CAPSULE, EXTENDED RELEASE ORAL at 06:10

## 2019-01-01 RX ADMIN — Medication 650 MILLIGRAM(S): at 18:10

## 2019-01-01 RX ADMIN — Medication 50 GRAM(S): at 22:14

## 2019-01-01 RX ADMIN — HYDROMORPHONE HYDROCHLORIDE 0.2 MILLIGRAM(S): 2 INJECTION INTRAMUSCULAR; INTRAVENOUS; SUBCUTANEOUS at 17:14

## 2019-01-01 RX ADMIN — HYDROMORPHONE HYDROCHLORIDE 0.2 MG/HR: 2 INJECTION INTRAMUSCULAR; INTRAVENOUS; SUBCUTANEOUS at 19:43

## 2019-01-01 RX ADMIN — MEROPENEM 100 MILLIGRAM(S): 1 INJECTION INTRAVENOUS at 18:30

## 2019-01-01 RX ADMIN — LEVETIRACETAM 500 MILLIGRAM(S): 250 TABLET, FILM COATED ORAL at 06:53

## 2019-01-01 RX ADMIN — TAMSULOSIN HYDROCHLORIDE 0.4 MILLIGRAM(S): 0.4 CAPSULE ORAL at 23:04

## 2019-01-01 RX ADMIN — SENNA PLUS 2 TABLET(S): 8.6 TABLET ORAL at 21:37

## 2019-01-01 RX ADMIN — Medication 200 MICROGRAM(S): at 11:11

## 2019-01-01 RX ADMIN — Medication 250 MILLIGRAM(S): at 08:24

## 2019-01-01 RX ADMIN — SODIUM CHLORIDE 1000 MILLILITER(S): 9 INJECTION INTRAMUSCULAR; INTRAVENOUS; SUBCUTANEOUS at 14:00

## 2019-01-01 RX ADMIN — CEFEPIME 100 MILLIGRAM(S): 1 INJECTION, POWDER, FOR SOLUTION INTRAMUSCULAR; INTRAVENOUS at 18:03

## 2019-01-01 RX ADMIN — Medication 200 MICROGRAM(S): at 06:15

## 2019-01-01 RX ADMIN — LEVETIRACETAM 500 MILLIGRAM(S): 250 TABLET, FILM COATED ORAL at 18:25

## 2019-01-01 RX ADMIN — CEFEPIME 100 MILLIGRAM(S): 1 INJECTION, POWDER, FOR SOLUTION INTRAMUSCULAR; INTRAVENOUS at 18:09

## 2019-01-01 RX ADMIN — MORPHINE SULFATE 1 MILLIGRAM(S): 50 CAPSULE, EXTENDED RELEASE ORAL at 05:12

## 2019-01-01 RX ADMIN — Medication 10 MILLIGRAM(S): at 05:20

## 2019-01-01 RX ADMIN — CEFEPIME 100 MILLIGRAM(S): 1 INJECTION, POWDER, FOR SOLUTION INTRAMUSCULAR; INTRAVENOUS at 19:20

## 2019-01-01 RX ADMIN — LEVETIRACETAM 500 MILLIGRAM(S): 250 TABLET, FILM COATED ORAL at 23:01

## 2019-01-01 RX ADMIN — SODIUM CHLORIDE 30 MILLILITER(S): 9 INJECTION, SOLUTION INTRAVENOUS at 08:27

## 2019-01-01 RX ADMIN — ATORVASTATIN CALCIUM 40 MILLIGRAM(S): 80 TABLET, FILM COATED ORAL at 21:18

## 2019-01-01 RX ADMIN — Medication 10 MILLIGRAM(S): at 23:04

## 2019-01-01 RX ADMIN — ALTEPLASE 2 MILLIGRAM(S): KIT at 15:38

## 2019-01-01 RX ADMIN — ISOSORBIDE DINITRATE 10 MILLIGRAM(S): 5 TABLET ORAL at 21:20

## 2019-01-01 RX ADMIN — ISOSORBIDE DINITRATE 10 MILLIGRAM(S): 5 TABLET ORAL at 13:22

## 2019-01-01 RX ADMIN — CEFEPIME 100 MILLIGRAM(S): 1 INJECTION, POWDER, FOR SOLUTION INTRAMUSCULAR; INTRAVENOUS at 17:30

## 2019-01-01 RX ADMIN — Medication 650 MILLIGRAM(S): at 21:57

## 2019-01-01 RX ADMIN — ISOSORBIDE DINITRATE 10 MILLIGRAM(S): 5 TABLET ORAL at 21:05

## 2019-01-01 RX ADMIN — TAMSULOSIN HYDROCHLORIDE 0.4 MILLIGRAM(S): 0.4 CAPSULE ORAL at 21:05

## 2019-01-01 RX ADMIN — HYDROMORPHONE HYDROCHLORIDE 0.3 MILLIGRAM(S): 2 INJECTION INTRAMUSCULAR; INTRAVENOUS; SUBCUTANEOUS at 11:57

## 2019-01-01 RX ADMIN — Medication 650 MILLIGRAM(S): at 06:38

## 2019-01-01 RX ADMIN — Medication 650 MILLIGRAM(S): at 13:08

## 2019-01-01 RX ADMIN — CARVEDILOL PHOSPHATE 12.5 MILLIGRAM(S): 80 CAPSULE, EXTENDED RELEASE ORAL at 17:25

## 2019-01-01 RX ADMIN — HYDROMORPHONE HYDROCHLORIDE 0.2 MILLIGRAM(S): 2 INJECTION INTRAMUSCULAR; INTRAVENOUS; SUBCUTANEOUS at 01:55

## 2019-01-01 RX ADMIN — Medication 10 MILLIGRAM(S): at 06:10

## 2019-01-01 RX ADMIN — LEVETIRACETAM 500 MILLIGRAM(S): 250 TABLET, FILM COATED ORAL at 17:25

## 2019-01-01 RX ADMIN — HYDROMORPHONE HYDROCHLORIDE 0.5 MG/HR: 2 INJECTION INTRAMUSCULAR; INTRAVENOUS; SUBCUTANEOUS at 12:49

## 2019-01-01 RX ADMIN — HYDROMORPHONE HYDROCHLORIDE 1 MILLIGRAM(S): 2 INJECTION INTRAMUSCULAR; INTRAVENOUS; SUBCUTANEOUS at 13:31

## 2019-01-01 RX ADMIN — Medication 40 MILLIEQUIVALENT(S): at 08:15

## 2019-01-01 RX ADMIN — HYDROMORPHONE HYDROCHLORIDE 0.3 MILLIGRAM(S): 2 INJECTION INTRAMUSCULAR; INTRAVENOUS; SUBCUTANEOUS at 00:00

## 2019-01-01 RX ADMIN — ISOSORBIDE DINITRATE 10 MILLIGRAM(S): 5 TABLET ORAL at 06:15

## 2019-01-01 RX ADMIN — CARVEDILOL PHOSPHATE 12.5 MILLIGRAM(S): 80 CAPSULE, EXTENDED RELEASE ORAL at 18:10

## 2019-01-01 RX ADMIN — Medication 112 MICROGRAM(S): at 05:18

## 2019-01-01 RX ADMIN — SENNA PLUS 2 TABLET(S): 8.6 TABLET ORAL at 23:03

## 2019-01-01 RX ADMIN — HYDROMORPHONE HYDROCHLORIDE 0.2 MG/HR: 2 INJECTION INTRAMUSCULAR; INTRAVENOUS; SUBCUTANEOUS at 07:28

## 2019-01-01 RX ADMIN — Medication 650 MILLIGRAM(S): at 06:54

## 2019-01-01 RX ADMIN — CARVEDILOL PHOSPHATE 12.5 MILLIGRAM(S): 80 CAPSULE, EXTENDED RELEASE ORAL at 23:57

## 2019-01-01 RX ADMIN — Medication 10 MILLIGRAM(S): at 13:04

## 2019-01-01 RX ADMIN — Medication 650 MILLIGRAM(S): at 17:15

## 2019-01-01 RX ADMIN — HYDROMORPHONE HYDROCHLORIDE 0.5 MILLIGRAM(S): 2 INJECTION INTRAMUSCULAR; INTRAVENOUS; SUBCUTANEOUS at 04:49

## 2019-01-01 RX ADMIN — HYDROMORPHONE HYDROCHLORIDE 0.5 MG/HR: 2 INJECTION INTRAMUSCULAR; INTRAVENOUS; SUBCUTANEOUS at 07:30

## 2019-01-01 RX ADMIN — LEVETIRACETAM 500 MILLIGRAM(S): 250 TABLET, FILM COATED ORAL at 18:16

## 2019-01-01 RX ADMIN — CHLORHEXIDINE GLUCONATE 1 APPLICATION(S): 213 SOLUTION TOPICAL at 17:56

## 2019-01-01 RX ADMIN — Medication 650 MILLIGRAM(S): at 00:52

## 2019-01-01 RX ADMIN — Medication 10 MILLIGRAM(S): at 13:39

## 2019-01-01 RX ADMIN — CHLORHEXIDINE GLUCONATE 1 APPLICATION(S): 213 SOLUTION TOPICAL at 05:04

## 2019-01-01 RX ADMIN — CEFEPIME 100 MILLIGRAM(S): 1 INJECTION, POWDER, FOR SOLUTION INTRAMUSCULAR; INTRAVENOUS at 18:20

## 2019-01-01 RX ADMIN — ATORVASTATIN CALCIUM 40 MILLIGRAM(S): 80 TABLET, FILM COATED ORAL at 21:21

## 2019-01-01 RX ADMIN — LEVETIRACETAM 500 MILLIGRAM(S): 250 TABLET, FILM COATED ORAL at 18:17

## 2019-01-01 RX ADMIN — LEVETIRACETAM 500 MILLIGRAM(S): 250 TABLET, FILM COATED ORAL at 06:16

## 2019-01-01 RX ADMIN — Medication 3 MILLIGRAM(S): at 22:13

## 2019-01-01 RX ADMIN — LEVETIRACETAM 500 MILLIGRAM(S): 250 TABLET, FILM COATED ORAL at 06:45

## 2019-01-01 RX ADMIN — Medication 650 MILLIGRAM(S): at 18:03

## 2019-01-01 RX ADMIN — Medication 650 MILLIGRAM(S): at 05:11

## 2019-03-14 NOTE — ED PROVIDER NOTE - CARE PLAN
Principal Discharge DX:	Acute adjustment disorder  Secondary Diagnosis:	Vomiting Principal Discharge DX:	JANEL (acute kidney injury)  Secondary Diagnosis:	Vomiting  Secondary Diagnosis:	Acute adjustment disorder

## 2019-03-14 NOTE — ED ADULT NURSE NOTE - OBJECTIVE STATEMENT
64 y/o m bibems/police after pt. verbalized to harm self, but on initial assessment, pt. vehemently denies any s/hi, no a/v hallucinations or delusions of any kind. pt. denies any psych hx, but reports hx of htn, dm, takes insulin. pt. is aaox1, denies any hx of etoh/drug abuse.

## 2019-03-14 NOTE — ED PROVIDER NOTE - CLINICAL SUMMARY MEDICAL DECISION MAKING FREE TEXT BOX
Dr. Ramos Note: not suicidal, pt made a statement out of frustration without any intent, pt with vomiting after antibx with reported reflux...po chall, treat reflux, check metabolic

## 2019-03-14 NOTE — ED PROVIDER NOTE - PROGRESS NOTE DETAILS
Liya Martell, : Discussed with BENI Hernández from [facility]. Creatinine upon discharge from [facility] 2.6 3 days ago. Labs suggest JANEL. Will hydrate and repeat blood work. NP at [facility] stated that patient endorsed SI. Patient denies, he states "I have no intention of killing myself, I just said that to raise hairs about the food scenario at [facility]". The patient has capacity and does not endorse SI/HI/AH/VH or plan. He is eating and drinking here. Does not feel safe at [facility]. Dr. Ramos Note: pt with fever spike to 103F, will obtain BC and VBG, no signs of sepsis at this time.  Stable for inpt medicine.

## 2019-03-14 NOTE — ED PROVIDER NOTE - ATTENDING CONTRIBUTION TO CARE
Pt sent from NH for making a statement of wanting to hurt himself.  Pt states he was frustrated because he couldn't talk to a doctor and was frustrated with all "the procedures."  Pt vomits after getting IV Rocephin so was refusing med due to vomiting.  Pt denies any pains, no SI/HI/AH/VH, no prior attempts, no current suicidality.  Pt awake, alert, oriented, clear lungs, abdomen soft, nt, normal mood and affect.  Pt does not require emergency psychiatry evaluation at this time.  Pt is willing to eat and obtain medications so long as vomiting is under control.

## 2019-03-14 NOTE — ED ADULT NURSE NOTE - ED STAT RN HANDOFF DETAILS 2
Handoff given to Jose BAZAN . Pt was taken for x ray at the time of report. Echo still pending, pt will be at [facility] location

## 2019-03-14 NOTE — ED PROVIDER NOTE - OBJECTIVE STATEMENT
Liya Martell, DO: 63M with acute and subacute infective endocarditis on ceftriaxone 2g q daily, hypothyroidism, BPH, HTN sent from  for SI. Patient has been non-compliant with medications & refusing. At baseline since arriving 3 days ago, AAOx3. Patient AAOx1 here. Liya Martell, DO: 63M with acute and subacute infective endocarditis on ceftriaxone 2g q daily, hypothyroidism, BPH, HTN sent from  for SI. Patient has been non-compliant with medications & refusing. At baseline since arriving 3 days ago, AAOx3.

## 2019-03-14 NOTE — ED ADULT NURSE NOTE - INTERVENTIONS DEFINITIONS
[place] to call system/Physically safe environment: no spills, clutter or unnecessary equipment/Call bell, personal items and telephone within reach/Stretcher in lowest position, wheels locked, appropriate side rails in place/Provide visual cue, wrist band, yellow gown, etc./Review medications for side effects contributing to fall risk/Instruct patient to call for assistance/Room bathroom lighting operational/Monitor for mental status changes and reorient to person, place, and time

## 2019-03-15 NOTE — H&P ADULT - NSICDXPROBLEM_GEN_ALL_CORE_FT
PROBLEM DIAGNOSES  Problem: Acute-on-chronic kidney injury  Assessment and Plan: - patient admitted for JANEL a per rehab reports, dicharge Cr was 2.5 and currently Cr is >3  - likely pre-renal as patient has been stating he is vomiting when he eats   - continue IV fluids and check BMP in AM to assess for improvement   - avoid nephrotoxic agents     Problem: Endocarditis  Assessment and Plan: - patient discharged from [facility] with diagnosis of endocarditis with vegetation (unknown which valve)   - repeat blood cultures collected  - continue ceftriaxone and rifampin   - will need to obtain records from [facility] in AM    Problem: Delusions  Assessment and Plan: - patient repeatedly stating there was "bacteria in his food" at [facility] and upset that no one is addressing it  - no psychiatric history per brother  - psych consult in AM    Problem: Thrombocytopenia  Assessment and Plan: - patient with platelet count of 56, unknown baseline   - will hold anticoagulation, monitor for signs of bleeding  - attempt to get labs from [facility]     Problem: Anemia  Assessment and Plan: - patient with anemia, possibly secondary to CKD  - no signs of bleeding  - iron studies in AM    Problem: History of heart valve replacement  Assessment and Plan: - brother states patient has had 2 heart valve replacements in the past but does not know which ones  - will need to contact [facility] to get more information     Problem: Hypertension  Assessment and Plan: - on several BP meds at home  - continue home meds with hold parameters    Problem: CHF (congestive heart failure)  Assessment and Plan: - patient on several medications to suggest CHF but brother did not know of any history  - attempt to get records from [facility]  - TTE ordered  - no signs of acute CHF on PE but will monitor carefully     Problem: Seizures  Assessment and Plan: - continue keppra     Problem: Need for prophylactic measure  Assessment and Plan: - DVT ppx held given thrombocytopenia  - Regular diet  - Dispo pending workup   May Reeves  PGY 2 Night Admit  Pager [phone] PROBLEM DIAGNOSES  Problem: Acute-on-chronic kidney injury  Assessment and Plan: - patient admitted for JANEL a per rehab reports, discharge Cr was 2.5 and currently Cr is >3  - likely pre-renal as patient has been stating he is vomiting when he eats   - continue IV fluids and check BMP in AM to assess for improvement   - avoid nephrotoxic agents   - holding torsemide    Problem: Endocarditis  Assessment and Plan: - patient discharged from [facility] with diagnosis of endocarditis with vegetation (unknown which valve)   - repeat blood cultures collected  - continue ceftriaxone and rifampin   - will need to obtain records from [facility] in AM  -  ID consult in AM     Problem: Delusions  Assessment and Plan: - patient repeatedly stating there was "bacteria in his food" at [facility] and upset that no one is addressing it  - no psychiatric history per brother  - psych consult in AM    Problem: Thrombocytopenia  Assessment and Plan: - patient with platelet count of 56, unknown baseline   - will hold anticoagulation, monitor for signs of bleeding  - attempt to get labs from [facility]     Problem: Anemia  Assessment and Plan: - patient with anemia, possibly secondary to CKD  - no signs of bleeding  - iron studies in AM    Problem: History of heart valve replacement  Assessment and Plan: - brother states patient has had 2 heart valve replacements in the past but does not know which ones  - will need to contact [facility] to get more information     Problem: Hypertension  Assessment and Plan: - on several BP meds at home  - continue home meds with hold parameters    Problem: CHF (congestive heart failure)  Assessment and Plan: - patient on several medications to suggest CHF but brother did not know of any history  - attempt to get records from [facility]  - TTE ordered  - no signs of acute CHF on PE but will monitor carefully     Problem: Seizures  Assessment and Plan: - continue keppra     Problem: Need for prophylactic measure  Assessment and Plan: - DVT ppx held given thrombocytopenia  - Regular diet  - Dispo pending workup   May Reeves  PGY 2 Night Admit  Pager [phone] PROBLEM DIAGNOSES  Problem: Acute-on-chronic kidney injury  Assessment and Plan: - patient admitted for JANEL a per rehab reports, discharge Cr was 2.5 and currently Cr is >3    - likely pre-renal as patient has been stating he is vomiting when he eats. Also has been refusing to eat.    - continue IV fluids and check BMP in AM to assess for improvement     - avoid nephrotoxic agents    --check bladder scan   - holding torsemide    Problem: Endocarditis  Assessment and Plan: - patient discharged from [facility] with diagnosis of endocarditis with vegetation (unknown which valve)     - repeat blood cultures collected    - continue ceftriaxone and rifampin     - will need to obtain records from [facility] in AM    -  ID consult in AM     Problem: Fever  Assessment and Plan: --non toxic appearing, but now febrile to 103  --suspect related to endocarditis. Reports he has missed some antibiotic doses.   --complete fever work up including CXR and urine (hasn't peed yet and refusing straight cath)  --will continue current coverage for endocarditis, but does need ID consult in the morning.     Problem: Delusions  Assessment and Plan: - patient repeatedly stating there was "bacteria in his food" at [facility] and upset that no one is addressing it    - no psychiatric history per brother    - psych consult in AM    Problem: Anemia  Assessment and Plan: - patient with anemia, possibly secondary to CKD    - no signs of bleeding    - iron studies in AM    Problem: CHF (congestive heart failure)  Assessment and Plan: --chronic, unknown if systolic or diastolic  - patient on several medications to suggest CHF but brother did not know of any history    - attempt to get records from [facility]    - TTE ordered    - no signs of acute CHF on PE but will monitor carefully     Problem: Hypertension  Assessment and Plan: - on several BP meds at home    - continue home meds with hold parameters    Problem: History of heart valve replacement  Assessment and Plan: - brother states patient has had 2 heart valve replacements in the past but does not know which ones    - will need to contact [facility] to get more information     Problem: Seizures  Assessment and Plan: - continue keppra     Problem: Thrombocytopenia  Assessment and Plan: - patient with platelet count of 56, unknown baseline     - will hold anticoagulation, monitor for signs of bleeding    - attempt to get labs from [facility]     Problem: Need for prophylactic measure  Assessment and Plan: - DVT ppx held given thrombocytopenia    - Regular diet    - Dispo pending workup         May Reeves    PGY 2 Night Admit    Pager [phone]

## 2019-03-15 NOTE — CHART NOTE - NSCHARTNOTEFT_GEN_A_CORE
This is a 64 y/o M with a PMH significant for endocarditis (on ceftriaxone and rifampin), hypothyroidism, BPH, history of seizures, and HTN who presents to the hospital from rehab for suicidal ideations, admitted for Fever T103, JANEL and delusions.   CTH done. 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 House cardiology at bedside. troponins ordered, EKG, Tele sinus 70-80's with PVC's, check magnesium and BMP. This is a 64 y/o M with a PMH significant for endocarditis (on ceftriaxone and rifampin), hypothyroidism, BPH, history of seizures, and HTN who presents to the hospital from rehab for suicidal ideations, admitted for Fever T103, JANEL and delusions.   CTH done. 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 House cardiology at bedside. troponin ordered, EKG, Tele sinus 70-80's with PVC's, check magnesium and BMP. This is a 62 y/o M with a PMH significant for endocarditis (on ceftriaxone and rifampin), hypothyroidism, BPH, history of seizures, and HTN who presents to the hospital from rehab for suicidal ideations, admitted for Fever T103, JANEL and delusions.   CTH done. 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 House cardiology at bedside. troponin ordered, EKG, Tele sinus 70-80's with PVC's, check magnesium and BMP. Attending notified.

## 2019-03-15 NOTE — BEHAVIORAL HEALTH ASSESSMENT NOTE - NSBHCHARTREVIEWVS_PSY_A_CORE FT
Vital Signs Last 24 Hrs  T(C): 37.1 (15 Mar 2019 09:21), Max: 39.4 (15 Mar 2019 00:18)  T(F): 98.8 (15 Mar 2019 09:21), Max: 103 (15 Mar 2019 00:18)  HR: 74 (15 Mar 2019 09:21) (74 - 98)  BP: 128/75 (15 Mar 2019 09:21) (104/58 - 130/86)  BP(mean): --  RR: 18 (15 Mar 2019 09:21) (16 - 20)  SpO2: 96% (15 Mar 2019 09:21) (96% - 98%)

## 2019-03-15 NOTE — CONSULT NOTE ADULT - SUBJECTIVE AND OBJECTIVE BOX
CARSON [id number] is a 63y old  Male who presents with a chief complaint of JANEL (15 Mar 2019 13:29)      HPI:  63M PMH significant for endocarditis (on ceftriaxone and rifampin), hypothyroidism, BPH, history of seizures, and HTN who presents to the hospital from rehab for suicidal ideations. Per ED and nursing home documentation, the patient has been non compliant with his medications since coming to rehab. Today, he told someone from the staff that he wanted to kill himself so he was brought to the ED. When seen, the patient repeatedly stated that he was upset that at [place] whenever he ate food, he would become nauseous and vomit. He believed there was a "bacteria contaminating the food" and had requested several people to address the issue but no one had done this.  He was frustrated by this and told the staff that he wanted to kill himself because he wanted to get out of the nursing home. When asked about his previous hospital course, he continues to talk about the "bacteria in the food" at [place]. He is AAOx3 otherwise.      Spoke with brother Renee to get collateral information (). He states that his brother has a history of a nephrectomy (2/2 cancer, did not receive chemo), HTN, hypothyroidism, s/p 2 heart valve replacements (does not know which valves), and seizures. The patient's last heart valve replacement surgery was 2 year ago and he had been doing well. However, about 1 month ago the patient's brother went to go see him at his home and found the patient unresponsive on the ground. He was breathing and had a pulse but he called EMS who brought him to  and was subsequently transferred to [place]. While at [place], he was found to have endocarditis with a vegetation on one of his valves. Per the brother, the plan was to treat him with antibiotics for 1 month and then reassess. He was sent to rehab 3 days ago. The brother states he does not have any history of psychiatric issues but the patient told him he told the staff at rehab that he was suicidal because no one was addressing his concern regarding the "bacteria in the food". He does admit to missing a few doses of antibiotics.     In the ED, VS significant for Tmax 103, HR: 85, BP: 104/58, RR: 16 sating 98% on RA. (15 Mar 2019 04:03)          MEDICATIONS  (STANDING):  atorvastatin 40 milliGRAM(s) Oral at bedtime  carvedilol 12.5 milliGRAM(s) Oral every 12 hours  cefepime   IVPB 2000 milliGRAM(s) IV Intermittent every 24 hours  hydrALAZINE 10 milliGRAM(s) Oral three times a day  isosorbide   dinitrate Tablet (ISORDIL) 10 milliGRAM(s) Oral three times a day  levETIRAcetam 500 milliGRAM(s) Oral two times a day  levothyroxine 112 MICROGram(s) Oral daily  rifampin 300 milliGRAM(s) Oral three times a day  senna 2 Tablet(s) Oral at bedtime  sodium bicarbonate 650 milliGRAM(s) Oral two times a day  tamsulosin 0.4 milliGRAM(s) Oral at bedtime  torsemide 20 milliGRAM(s) Oral daily    MEDICATIONS  (PRN):    PAST MEDICAL & SURGICAL HISTORY:  Hypothyroidism  Congestive heart failure (CHF)  Hypertension  Seizures  BPH (benign prostatic hypertrophy)  S/p nephrectomy  History of heart valve replacement    FAMILY HISTORY:  No pertinent family history in first degree relatives    Allergies    penicillin (Swelling)    Intolerances        SHx - No smoking, No ETOH, No drug abuse      Review of Systems:  CONSTITUTIONAL:   HEENT:  No visual loss, blurred vision, double vision.  No hearing loss, sneezing, congestion, runny nose or sore throat.  SKIN:  No rash or itching.  CARDIOVASCULAR:  No chest pain, chest pressure or chest discomfort. No palpitations or edema.  RESPIRATORY:  No shortness of breath, cough or sputum.  GASTROINTESTINAL:  No anorexia, nausea, vomiting or diarrhea. No abdominal pain.  GENITOURINARY:  NO dysuria. No increased frequency. No retention. No incontinence.  NEUROLOGICAL:  See HPI  MUSCULOSKELETAL:  No muscle, back pain, joint pain or stiffness.  HEMATOLOGIC:  No anemia, bleeding or bruising.  PSYCHIATRIC:    ENDOCRINOLOGIC:  No reports of sweating, cold or heat intolerance. No polyuria or polydipsia.        Vital Signs Last 24 Hrs  T(C): 37.8 (15 Mar 2019 18:42), Max: 39.4 (15 Mar 2019 00:18)  T(F): 100 (15 Mar 2019 18:42), Max: 103 (15 Mar 2019 00:18)  HR: 82 (15 Mar 2019 18:42) (74 - 98)  BP: 105/66 (15 Mar 2019 18:42) (104/58 - 130/86)  BP(mean): --  RR: 18 (15 Mar 2019 18:42) (16 - 20)  SpO2: 95% (15 Mar 2019 18:42) (95% - 98%)    General Exam:   General appearance: No acute distress    Cardiac:  Pulm:                 Neurological Exam:  Mental Status: Orientated to self, date and place.  Attention intact.  No dysarthria. Speech fluent.  Cranial Nerves:   PERRL, EOMI, VFF, no nystagmus.    CN V1-3 intact to light touch .  No facial asymmetry.  Hearing intact to finger rub bilaterally.  Tongue, uvula and palate midline.  Sternocleidomastoid and Trapezius intact bilaterally.    Motor:   Tone: normal.                  Strength:     [] Upper extremity                      Delt       Bicep    Tricep                                                  R         5/5        5/5        5/5       5/5                                               L          5/5        5/5        5/5       5/5  [] Lower extremity                       HF          KE          KF        DF         PF                                               R        5/5        5/5        5/5       5/5       5/5                                               L         5/5        5/5       5/5       5/5        5/5  Pronator drift: none                 Dysmetria: None to finger-nose-finger or heel-shin-heel  No truncal ataxia.    Tremor: No resting, postural or action tremor.  No myoclonus.    Sensation: intact to light touch, pinprick, vibration and proprioception    Deep Tendon Reflexes:     Biceps          Triceps      BR        Patellar        Ankle         Babinski                                         R       2+                   2+           2+            2+               2+           downgoing                                         L        2+                  2+           2+            2+               2+           downgoing    Gait: normal.      Other:    03-15    135  |  98  |  28<H>  ----------------------------<  86  4.0   |  22  |  2.60<H>    Ca    8.2<L>      15 Mar 2019 17:25  Phos  3.8     03-15  Mg     1.6     03-15    TPro  7.8  /  Alb  2.8<L>  /  TBili  0.6  /  DBili  x   /  AST  68<H>  /  ALT  33  /  AlkPhos  104  03-14                            9.0    9.7   )-----------( 53       ( 15 Mar 2019 07:04 )             27.2       Radiology    CT:   MRI  EKG:  tele:  TTE:  EEG: CARSON [id number] is a 63y old  Male who presents with a chief complaint of JANEL (15 Mar 2019 13:29)      HPI:  63M PMH significant for prosthetic valve replacement, mitral valve endocarditis (on ceftriaxone and rifampin), history of seizures, and HTN who presents to the hospital from rehab for suicidal ideations. Per ED and nursing home documentation, the patient has been non compliant with his medications since coming to rehab. Today, he told someone from the staff that he wanted to kill himself so he was brought to the ED. When seen, the patient repeatedly stated that he was upset that at [place] whenever he ate food, he would become nauseous and vomit.     Spoke with brother Renee to get collateral information (). He states that his brother has a history of a nephrectomy (2/2 cancer, did not receive chemo), HTN, hypothyroidism, s/p 2 heart valve replacements (does not know which valves), and seizures. The patient's last heart valve replacement surgery was 2 year ago and he had been doing well. However, about 1 month ago the patient's brother went to go see him at his home and found the patient unresponsive on the ground. He was brought to [facility] and was subsequently transferred to [place]. While at [place], he was found to have endocarditis. Per the brother, the plan was to treat him with antibiotics for 1 month and then reassess. He was sent to rehab 3 days prior to admission to [facility].    Neurology was called because of right occipital stroke was found on CT head scan. NIHSS could not be obtained due to patient cooperation. MRS 3.    MEDICATIONS  (STANDING):  atorvastatin 40 milliGRAM(s) Oral at bedtime  carvedilol 12.5 milliGRAM(s) Oral every 12 hours  cefepime   IVPB 2000 milliGRAM(s) IV Intermittent every 24 hours  hydrALAZINE 10 milliGRAM(s) Oral three times a day  isosorbide   dinitrate Tablet (ISORDIL) 10 milliGRAM(s) Oral three times a day  levETIRAcetam 500 milliGRAM(s) Oral two times a day  levothyroxine 112 MICROGram(s) Oral daily  rifampin 300 milliGRAM(s) Oral three times a day  senna 2 Tablet(s) Oral at bedtime  sodium bicarbonate 650 milliGRAM(s) Oral two times a day  tamsulosin 0.4 milliGRAM(s) Oral at bedtime  torsemide 20 milliGRAM(s) Oral daily    MEDICATIONS  (PRN):    PAST MEDICAL & SURGICAL HISTORY:  Hypothyroidism  Congestive heart failure (CHF)  Hypertension  Seizures  BPH (benign prostatic hypertrophy)  S/p nephrectomy  History of heart valve replacement    FAMILY HISTORY:  No pertinent family history in first degree relatives    Allergies    penicillin (Swelling)    Intolerances        SHx - No smoking, No ETOH, No drug abuse      Review of Systems:    see hpi    Vital Signs Last 24 Hrs  T(C): 37.8 (15 Mar 2019 18:42), Max: 39.4 (15 Mar 2019 00:18)  T(F): 100 (15 Mar 2019 18:42), Max: 103 (15 Mar 2019 00:18)  HR: 82 (15 Mar 2019 18:42) (74 - 98)  BP: 105/66 (15 Mar 2019 18:42) (104/58 - 130/86)  BP(mean): --  RR: 18 (15 Mar 2019 18:42) (16 - 20)  SpO2: 95% (15 Mar 2019 18:42) (95% - 98%)    Neurological Exam:  Mental Status: Orientated to self. would not participate with rest of mental status exam.  RLE at least 3/5, would not participate to LLE testing.  Pronator drift: could not assess due to patient participation  Dysmetria: could not assess due to patient participation  Gait: normal.      Other:    03-15    135  |  98  |  28<H>  ----------------------------<  86  4.0   |  22  |  2.60<H>    Ca    8.2<L>      15 Mar 2019 17:25  Phos  3.8     03-15  Mg     1.6     03-15    TPro  7.8  /  Alb  2.8<L>  /  TBili  0.6  /  DBili  x   /  AST  68<H>  /  ALT  33  /  AlkPhos  104  03-14                            9.0    9.7   )-----------( 53       ( 15 Mar 2019 07:04 )             27.2       Radiology    CT: < from: CT Head No Cont (03.15.19 @ 13:24) >    IMPRESSION:    Low-density in the right occipital lobe suspicious for a recent infarct.   Given history of endocarditis, the infarct may be embolic in nature.    < end of copied text >    MRI  EKG:  tele:  TTE:  EEG: CARSON [id number] is a 63y old  Male who presents with a chief complaint of JANEL (15 Mar 2019 13:29)      HPI:  63M PMH significant for prosthetic valve replacement, mitral valve endocarditis (on ceftriaxone and rifampin), history of seizures, and HTN, nephrectomy, who presents to the hospital from rehab for suicidal ideations. Per ED and nursing home documentation, the patient has been non compliant with his medications since coming to rehab. Today, he told someone from the staff that he wanted to kill himself so he was brought to the ED. When seen, the patient repeatedly stated that he was upset that at [place] whenever he ate food, he would become nauseous and vomit.     Per chart review, collateral was obtained from brother. The patient's last heart valve replacement surgery was 2 year ago and he had been doing well. However, about 1 month ago the patient's brother went to go see him at his home and found the patient unresponsive on the ground. He was brought to [facility] and was subsequently transferred to [place]. While at [place], he was found to have endocarditis. Per the brother, the plan was to treat him with antibiotics for 1 month and then reassess. He was sent to rehab 3 days prior to admission to [facility].    Neurology was called because of right occipital stroke was found on CT head scan. NIHSS could not be obtained due to patient cooperation. MRS 3.    MEDICATIONS  (STANDING):  atorvastatin 40 milliGRAM(s) Oral at bedtime  carvedilol 12.5 milliGRAM(s) Oral every 12 hours  cefepime   IVPB 2000 milliGRAM(s) IV Intermittent every 24 hours  hydrALAZINE 10 milliGRAM(s) Oral three times a day  isosorbide   dinitrate Tablet (ISORDIL) 10 milliGRAM(s) Oral three times a day  levETIRAcetam 500 milliGRAM(s) Oral two times a day  levothyroxine 112 MICROGram(s) Oral daily  rifampin 300 milliGRAM(s) Oral three times a day  senna 2 Tablet(s) Oral at bedtime  sodium bicarbonate 650 milliGRAM(s) Oral two times a day  tamsulosin 0.4 milliGRAM(s) Oral at bedtime  torsemide 20 milliGRAM(s) Oral daily    MEDICATIONS  (PRN):    PAST MEDICAL & SURGICAL HISTORY:  Hypothyroidism  Congestive heart failure (CHF)  Hypertension  Seizures  BPH (benign prostatic hypertrophy)  S/p nephrectomy  History of heart valve replacement    FAMILY HISTORY:  No pertinent family history in first degree relatives    Allergies    penicillin (Swelling)    Intolerances        SHx - No smoking, No ETOH, No drug abuse      Review of Systems:    see hpi    Vital Signs Last 24 Hrs  T(C): 37.8 (15 Mar 2019 18:42), Max: 39.4 (15 Mar 2019 00:18)  T(F): 100 (15 Mar 2019 18:42), Max: 103 (15 Mar 2019 00:18)  HR: 82 (15 Mar 2019 18:42) (74 - 98)  BP: 105/66 (15 Mar 2019 18:42) (104/58 - 130/86)  BP(mean): --  RR: 18 (15 Mar 2019 18:42) (16 - 20)  SpO2: 95% (15 Mar 2019 18:42) (95% - 98%)    Neurological Exam:  Mental Status: Orientated to self. would not participate with rest of mental status exam.  RLE at least 3/5, would not participate to LLE testing.  Pronator drift: could not assess due to patient participation  Dysmetria: could not assess due to patient participation  Gait: normal.      Other:    03-15    135  |  98  |  28<H>  ----------------------------<  86  4.0   |  22  |  2.60<H>    Ca    8.2<L>      15 Mar 2019 17:25  Phos  3.8     03-15  Mg     1.6     03-15    TPro  7.8  /  Alb  2.8<L>  /  TBili  0.6  /  DBili  x   /  AST  68<H>  /  ALT  33  /  AlkPhos  104  03-14                            9.0    9.7   )-----------( 53       ( 15 Mar 2019 07:04 )             27.2       Radiology    CT: < from: CT Head No Cont (03.15.19 @ 13:24) >    IMPRESSION:    Low-density in the right occipital lobe suspicious for a recent infarct.   Given history of endocarditis, the infarct may be embolic in nature.    < end of copied text >    MRI  EKG:  tele:  TTE:  EEG:

## 2019-03-15 NOTE — CHART NOTE - NSCHARTNOTEFT_GEN_A_CORE
Claire Patterson PGY-3  Dept. Internal Medicine  Pager: [phone]    TO BE COMPLETED WITHIN 6 HOURS OF INITIAL ASSESSMENT:    For use in patients that have 2 sepsis criteria and new organ dysfunction   •	New or increased oxygen requirement  •	Creatinine >2mg/dL  •	Bilirubin>2mg/dL  •	Platelet <100,00/mm3  •	INR >1.5, PTT>60  •	Lactate >2    If patient persistent hypotension (SBP<90) or any lactate >4 then provider evaluation (Physician/PA/NP) within 30 minutes of bolus completion is required.    Vital Signs Last 24 Hrs  T(C): 36.6 (14 Mar 2019 20:52), Max: 36.6 (14 Mar 2019 20:52)  T(F): 97.9 (14 Mar 2019 20:52), Max: 97.9 (14 Mar 2019 20:52)  HR: 85 (14 Mar 2019 20:52) (85 - 85)  BP: 104/58 (14 Mar 2019 20:52) (104/58 - 104/58)  BP(mean): --  RR: 16 (14 Mar 2019 20:52) (16 - 16)  SpO2: 98% (14 Mar 2019 20:52) (98% - 98%)  		  LUNGS:  [  ]Clear bilaterally [  ] Wheeze [  ] Rhonchi [  ] Rales [  ] Crackles; Other:  HEART: [  ]RRR [  ] No murmur[  ]  Normal S1S2[  ] Tachycardia;  Other:  CAPILLARY REFULL:  	Fingers: [  ] less than 2 seconds [  ] more than 2 seconds                                           Toes: [  ]  less than 2 seconds [  ] more than 2 seconds   PERIPHERAL PULSES:  Radial: [  ] Palpable  [  ]  non-palpable                                         Dorsalis Pedis: [  ] Palpable  [  ] non-palpable                                         Posterior Tibial: [  ] Palpable  [  ] non-palpable                                          Other:  SKIN:   [  ]  Diaphoretic  [  ]  mottling  [  ]  Cold extremities  [  ]  Warm [  ]  Dry                      Other:    BEDSIDE ULTRASOUND FINDINGS (IF APPLICABLE):    Labs:  14 Mar 2019 22:31    135    |  97     |  32     ----------------------------<  95     4.1     |  21     |  3.14     Ca    8.8        14 Mar 2019 22:31    TPro  7.8    /  Alb  2.8    /  TBili  0.6    /  DBili  x      /  AST  68     /  ALT  33     /  AlkPhos  104    14 Mar 2019 22:31                          10.9   10.6  )-----------( 58       ( 14 Mar 2019 22:31 )             33.2       Lactate:    Plan (orders must be placed in EMR):     [ X ]  Check Repeat Lactate   [  ]  No change in current plan  [  ]  Start Vasopressors:  [  ]  Repeat Fluid Bolus:  [  ] other:    Care Discussed with Consultants/Other Providers [ ] YES  [ ] NO

## 2019-03-15 NOTE — CONSULT NOTE ADULT - ASSESSMENT
63 year old male with recent admission for endocarditis discharged on ceftriaxone and rifampin via right arm picc, hypothyroidism, ?nephrectomy, BPH, history of seizures, and HTN who presents to the hospital from rehab for suicidal ideations.     At NH, he told someone from the staff that he wanted to kill himself so he was brought to the ED  s/p 2 heart valve replacements (does not know which valves), last replacement was 2 year ago     While at [place], found to have endocarditis on ceftriaxone and rifampin - was reported as culture negative endocarditis    In the ED, the patient had a fever, leukocytosis 10.6  Right arm picc   Having nausea, vomiting, and diarrhea  Had a dog in the past, but currently does not have any pets  Denies recent travel  Born in [place]  Recalls always being negative for TB    Recommend:  -Given fevers, would change antibiotics to vancomycin and cefepime renally dosed.  -Continue rifampin  -Will need to obtain chart from [place]  -Repeat TTE, may need CARLY  -F/U blood cultures  -F/U CT C/A/P  -F/U CT Head    For Culture negative endocarditis, would send off:  -Quant TB Gold  -Bartonella serologies  -Q fever serologies  -Urine legionella  -Brucella serologies  -Chlamydia serologies

## 2019-03-15 NOTE — BEHAVIORAL HEALTH ASSESSMENT NOTE - AXIS III
nephrectomy (2/2 cancer, did not receive chemo), HTN, hypothyroidism, s/p 2 heart valve replacements (does not know which valves), and seizures

## 2019-03-15 NOTE — CONSULT NOTE ADULT - NSICDXPROBLEM_GEN_ALL_CORE_FT
PROBLEM DIAGNOSES  Problem: Endocarditis  Recommendation: Continue with Medical management with IV Abx per ID   Will need a CARLY to  evaluated MV. PROBLEM DIAGNOSES  Problem: Endocarditis  Recommendation: Continue with Medical management with IV Abx per ID   Will need a TTE/CARLY to further evaluated MV.   Neurology consult for possible HEAT CT Angio   Psych Consult to evaluate for capacity

## 2019-03-15 NOTE — BEHAVIORAL HEALTH ASSESSMENT NOTE - HPI (INCLUDE ILLNESS QUALITY, SEVERITY, DURATION, TIMING, CONTEXT, MODIFYING FACTORS, ASSOCIATED SIGNS AND SYMPTOMS)
62y/o black male, domiciled in [place], with no formal psychiatric history, no SA, no substance abuse, with PMHx significant for endocarditis (on ceftriaxone and rifampin), hypothyroidism, BPH, history of seizures, and HTN who presents to the hospital from rehab for suicidal ideations. Per ED and nursing home documentation, the patient has been non compliant with his medications since coming to rehab. PTA to ED, patient told someone from the staff that he wanted to kill himself so he was brought to the ED. When seen, the patient repeatedly stated that he was upset that at [place] whenever he ate food, he would become nauseous and vomit. He believed there was a "bacteria contaminating the food" and had requested several people to address the issue but no one had done this.  He was frustrated by this and told the staff that he wanted to kill himself because he wanted to get out of the nursing home. When asked about his previous hospital course, he continues to talk about the "bacteria in the food" at [place].  Psychiatry consulted to assess for psychosis, delirium and suicidality.   Patient seen and evaluated, awake and alert, laying on hospital stretcher, oriented to person, disoriented to place (states not knowing what type of place this is, then later states he is in [facility]), only able to recall the year (2019, unable to state the current year).  Perseverates on beliefs that his food was being contaminated at [facility], states that every time he would eat the food was immediately have loose stools.  When questioned about reports of his recent suicidal statements, denies this, states "[facility] is only saying that about me because I questioned them about the contamination of the food", adamantly denies he made any suicidal statements, denies any current or recent SI, plan or intent.  He reports he has never seen a psychiatrist before, has no PPHx, denies h/o substance abuse including alcohol or smoking.  Denies mood symptoms, denies feeling depressed.  He denies A/V/H, continues to perseverate on the food at [place] and staff neglect about looking into this issue.  Denies feeling confused or having memory impairments.  Reports good sleep, however reports recently that his appetite has been diminished due to his paranoia about food, states "I haven't had a good meal in a while."  With patient's permission, attempted to reach out to his brother, Renee ([phone]), however there was no answer.  Message left requesting call back for collateral.

## 2019-03-15 NOTE — BEHAVIORAL HEALTH ASSESSMENT NOTE - NSBHCHARTREVIEWINVESTIGATE_PSY_A_CORE FT
< from: 12 Lead ECG (03.14.19 @ 22:06) >    Ventricular Rate 85 BPM    Atrial Rate 85 BPM    P-R Interval 178 ms    QRS Duration 80 ms    Q-T Interval 408 ms    QTC Calculation(Bezet) 485 ms    P Axis 74 degrees    R Axis -20 degrees    T Axis -78 degrees    Diagnosis Line NORMAL SINUS RHYTHM  MINIMAL VOLTAGE CRITERIA FOR LVH, MAY BE NORMAL VARIANT  INFERIOR INFARCT , AGE UNDETERMINED  ABNORMAL ECG    Confirmed by ATTENDING, ED ([id number]),  MYRON CHOWDHURY ([id number]) on 3/15/2019 4:40:01 AM    < end of copied text >

## 2019-03-15 NOTE — CHART NOTE - NSCHARTNOTEFT_GEN_A_CORE
per prior note troponin was ordered for chest pain, now results at >500. chest pain resolved. CKD creat 2.6.  next troponin due at 8:30 pm. I spoke to house cardio. Call them back with Troponin result. They will re eval. Continue to monitor on Tele  signed out to 6T

## 2019-03-15 NOTE — BEHAVIORAL HEALTH ASSESSMENT NOTE - NSBHCONSULTRECOMMENDOTHER_PSY_A_CORE FT
1. Check: TSH, B12, folate, RPR, UA, U-tox; consider CT head    2. Minimize use of benzos, opioids, anticholinergics, or other deliriogenic agents when possible.  Maintain sleep wake cycle.  Provide frequent reorientation and redirection.  Family member at bedside if possible. Assess for need for glasses and hearing aid (if applicable).

## 2019-03-15 NOTE — H&P ADULT - ASSESSMENT
This is a 62 y/o M with a PMH significant for endocarditis (on ceftriaxone and rifampin), hypothyroidism, BPH, history of seizures, and HTN who presents to the hospital from rehab for suicidal ideations, admitted for JANEL with delusions. This is a 62 y/o M with a PMH significant for endocarditis (on ceftriaxone and rifampin), hypothyroidism, BPH, history of seizures, and HTN who presents to the hospital from rehab for suicidal ideations, admitted for JANEL with delusions also found to be febrile.

## 2019-03-15 NOTE — CONSULT NOTE ADULT - ASSESSMENT
63y Male presents with Bioprosthetic MV Endocarditis and new acute CVA 63y Male presents with Bioprosthetic MV Endocarditis and right occipital CVA

## 2019-03-15 NOTE — H&P ADULT - HISTORY OF PRESENT ILLNESS
This is a 64 y/o M with a PMH significant for endocarditis (on ceftriaxone and rifampin), hypothyroidism, BPH, history of seizures, and HTN who presents to the hospital from rehab for suicidal ideations. Per ED and nursing home documentation, the patient has been non compliant with his medications since coming to rehab. Today, he told someone from the staff that he wanted to kill himself so he was brought to the ED. When seen, the patient repeatedly stated that he was upset that at [facility] whenever he ate food, he would become nauseous and vomit. He believed there was a "bacteria contaminating the food" and had requested several people to address the issue but no one had done this.  He was frustrated by this and told the staff that he wanted to kill himself because he wanted to get out of the nursing home. When asked about his previous hospital course, he continues to talk about the "bacteria in the food" at [facility]. He is AAOx3 otherwise.      Spoke with brother Renee to get collateral information (). He states that his brother has a history of a nephrectomy (2/2 cancer, did not receive chemo), HTN, hypothyroidism, s/p 2 heart valve replacements (does not know which valves), and seizures. The patient's last heart valve replacement surgery was 2 year ago and he had been doing well. However, about 1 month ago the patient's brother went to go see him at his home and found the patient unresponsive on the ground. He was breathing and had a pulse but he called EMS who brought him to [facility] and was subsequently transferred to [facility]. While at [facility], he was found to have endocarditis with a vegetation on one of his valves. Per the brother, the plan was to treat him with antibiotics for 1 month and then reassess. He was sent to rehab 3 days ago. The brother states he does not have any history of psychiatric issues but the patient told him he told the staff at rehab that he was suicidal because no one was addressing his concern regarding the "bacteria in the food".     In the ED, VS significant for Tmax 103, HR: 85, BP: 104/58, RR: 16 sating 98% on RA. 63M PMH significant for endocarditis (on ceftriaxone and rifampin), hypothyroidism, BPH, history of seizures, and HTN who presents to the hospital from rehab for suicidal ideations. Per ED and nursing home documentation, the patient has been non compliant with his medications since coming to rehab. Today, he told someone from the staff that he wanted to kill himself so he was brought to the ED. When seen, the patient repeatedly stated that he was upset that at [facility] whenever he ate food, he would become nauseous and vomit. He believed there was a "bacteria contaminating the food" and had requested several people to address the issue but no one had done this.  He was frustrated by this and told the staff that he wanted to kill himself because he wanted to get out of the nursing home. When asked about his previous hospital course, he continues to talk about the "bacteria in the food" at [facility]. He is AAOx3 otherwise.      Spoke with brother Renee to get collateral information (). He states that his brother has a history of a nephrectomy (2/2 cancer, did not receive chemo), HTN, hypothyroidism, s/p 2 heart valve replacements (does not know which valves), and seizures. The patient's last heart valve replacement surgery was 2 year ago and he had been doing well. However, about 1 month ago the patient's brother went to go see him at his home and found the patient unresponsive on the ground. He was breathing and had a pulse but he called EMS who brought him to [facility] and was subsequently transferred to [facility]. While at [facility], he was found to have endocarditis with a vegetation on one of his valves. Per the brother, the plan was to treat him with antibiotics for 1 month and then reassess. He was sent to rehab 3 days ago. The brother states he does not have any history of psychiatric issues but the patient told him he told the staff at rehab that he was suicidal because no one was addressing his concern regarding the "bacteria in the food". He does admit to missing a few doses of antibiotics.     In the ED, VS significant for Tmax 103, HR: 85, BP: 104/58, RR: 16 sating 98% on RA.

## 2019-03-15 NOTE — CONSULT NOTE ADULT - SUBJECTIVE AND OBJECTIVE BOX
History of Present Illness:  63y Male    Past Medical History  Hypothyroidism  Congestive heart failure (CHF)  Hypertension  Seizures  BPH (benign prostatic hypertrophy)      Past Surgical History  S/p nephrectomy  History of heart valve replacement  No significant past surgical history      MEDICATIONS  (STANDING):  atorvastatin 40 milliGRAM(s) Oral at bedtime  carvedilol 12.5 milliGRAM(s) Oral every 12 hours  cefepime   IVPB 2000 milliGRAM(s) IV Intermittent every 24 hours  hydrALAZINE 10 milliGRAM(s) Oral three times a day  isosorbide   dinitrate Tablet (ISORDIL) 10 milliGRAM(s) Oral three times a day  levETIRAcetam 500 milliGRAM(s) Oral two times a day  levothyroxine 112 MICROGram(s) Oral daily  rifampin 300 milliGRAM(s) Oral three times a day  senna 2 Tablet(s) Oral at bedtime  sodium bicarbonate 650 milliGRAM(s) Oral two times a day  tamsulosin 0.4 milliGRAM(s) Oral at bedtime  torsemide 20 milliGRAM(s) Oral daily    MEDICATIONS  (PRN):    Allergies: penicillin (Swelling)      SOCIAL HISTORY:  Smoker: [ ] Yes  [X ] No        PACK YEARS:                         WHEN QUIT?   ETOH use: [ ] Yes  [X ] No              FREQUENCY / QUANTITY:  Ilicit Drug use:  [ ] Yes  [X] No  Occupation: Denies   Live with: Alone   Assist device use: Denies     Relevant Family History  FAMILY HISTORY:  No pertinent family history in first degree relatives    Review of Systems  GENERAL:  Fevers[X] chills[] sweats[] fatigue[] weight loss[] weight gain []                                        NEURO:  parathesias[] seizures []  syncope []  confusion []                                                                                  EYES: glasses[]  blurry vision[]  discharge[] pain[] glaucoma []                                                                            ENMT:  difficulty hearing []  vertigo[]  dysphagia[] epistaxis[] recent dental work []                                      CV:  chest pain[] palpitations[] OLIVIER [] diaphoresis [] edema[]                                                                                             RESPIRATORY:  wheezing[] SOB[] cough [] sputum[] hemoptysis[]                                                                    GI:  nausea[]  vomiting []  diarrhea[] constipation [] melena []                                                                        : hematuria[ ]  dysuria[ ] urgency[] incontinence[]                                                                                              MUSCULOSKELETAL  arthritis[ ]  joint swelling [ ] muscle weakness [ ]                                                                  SKIN/BREAST:  rash[ ] itching [ ]  hair loss[ ] masses[ ]                                                                                                PSYCH:  dementia [ ] depression [x ] anxiety[X ]                                                                                                                  HEME/LYMPH:  bruises easily[ ] enlarged lymph nodes[ ] tender lymph nodes[ ]                                                 ENDOCRINE:  cold intolerance[ ] heat intolerance[ ] polydipsia[ ]                                                                              PHYSICAL EXAM  Vital Signs Last 24 Hrs  T(C): 37.8 (15 Mar 2019 18:42), Max: 39.4 (15 Mar 2019 00:18)  T(F): 100 (15 Mar 2019 18:42), Max: 103 (15 Mar 2019 00:18)  HR: 82 (15 Mar 2019 18:42) (74 - 98)  BP: 105/66 (15 Mar 2019 18:42) (104/58 - 130/86)  BP(mean): --  RR: 18 (15 Mar 2019 18:42) (16 - 20)  SpO2: 95% (15 Mar 2019 18:42) (95% - 98%)      PATIENT REFUSED EXAM UNABLE TO PERFORM EXAM      General: Well nourished, well developed, no acute distress.                                                         Neuro: Normal exam oriented to person/place & time with no focal motor or sensory deficits.                    Eyes: Normal exam of conjunctiva & lids, pupils equally reactive.   ENT: Normal exam of nasal/oral mucosa with absence of cyanosis.   Neck: Normal exam of jugular veins, trachea & thyroid.   Chest: Normal lung exam with good air movement absence of wheezes, rales, or rhonchi:                                                                  CV:  Auscultation: normal [ ] S3[ ] S4[ ] Irregular [ ] Rub[ ] Clicks[ ]  Murmurs none:[ ]systolic [ ]  diastolic [ ] holosystolic [ ]  Carotids: No Bruits[ ] Other____________ Abdominal Aorta: normal [ ] nonpalpable[ ]                                                                         GI: Normal exam of abdomen, liver & spleen with no noted masses or tenderness.  (+) BS X 4 Quadrants, Nontender / Non Distended.                                                                                             Extremities: Normal no evidence of cyanosis or deformity Edema: none[ ]trace[ ]1+[ ]2+[ ]3+[ ]4+[ ]  Lower Extremity Pulses: Right[ ] Left[ ]Varicosities[ ]  SKIN                                                        LABS:                        9.0    9.7   )-----------( 53       ( 15 Mar 2019 07:04 )             27.2     03-15    135  |  98  |  28<H>  ----------------------------<  86  4.0   |  22  |  2.60<H>    Ca    8.2<L>      15 Mar 2019 17:25  Phos  3.8     03-15  Mg     1.6     -15    TPro  7.8  /  Alb  2.8<L>  /  TBili  0.6  /  DBili  x   /  AST  68<H>  /  ALT  33  /  AlkPhos  104  03-14      Urinalysis Basic - ( 15 Mar 2019 10:19 )    Color: Dark Yellow / Appearance: Clear / S.013 / pH: x  Gluc: x / Ketone: Negative  / Bili: Negative / Urobili: Negative   Blood: x / Protein: 30 mg/dL / Nitrite: Negative   Leuk Esterase: Negative / RBC: 9 /hpf / WBC 2 /HPF   Sq Epi: x / Non Sq Epi: 1 /hpf / Bacteria: Negative History of Present Illness:  63y Male with a PMH significant for hypothyroidism, BPH, history of seizures, HTN, Nephrectomy 2/2 Renal CA and biorosthetic endocarditis of the Mitral valve who presented to [facility] ER from rehab due to suicidal ideation. Patient has apparently been noncompliant with medications while at the rehab and expressed suicidal ideation to a staff member. On arrival to the ED, he was found to be febrile to 103. As per OSH; Valve lesions reported to be 0.6 x 1 cm and 0.5 x 0.4 cm.  Blood cultures were negative and patient was treated for presumed culture negative endocarditis with Ceftriaxone and Rifampin.  3/15 Head CT revealed: Low-density in the right occipital lobe suspicious for a recent infarct. Neurology to follow. Patient now readmitted for further management.  CTS consult called to evaluate patient for cardiac surgery.    Past Medical History  Hypothyroidism  Congestive heart failure (CHF)  Hypertension  Seizures  BPH (benign prostatic hypertrophy)      Past Surgical History  S/p nephrectomy  History of heart valve replacement  No significant past surgical history    MEDICATIONS  (STANDING):  atorvastatin 40 milliGRAM(s) Oral at bedtime  carvedilol 12.5 milliGRAM(s) Oral every 12 hours  cefepime IVPB 2000 milliGRAM(s) IV Intermittent every 24 hours  hydrALAZINE 10 milliGRAM(s) Oral three times a day  isosorbide dinitrate Tablet (ISORDIL) 10 milliGRAM(s) Oral three times a day  levETIRAcetam 500 milliGRAM(s) Oral two times a day  levothyroxine 112 MICROGram(s) Oral daily  rifampin 300 milliGRAM(s) Oral three times a day  senna 2 Tablet(s) Oral at bedtime  sodium bicarbonate 650 milliGRAM(s) Oral two times a day  tamsulosin 0.4 milliGRAM(s) Oral at bedtime  torsemide 20 milliGRAM(s) Oral daily    MEDICATIONS  (PRN):    Allergies: penicillin (Swelling)      SOCIAL HISTORY:  Smoker: [ ] Yes  [X ] No        PACK YEARS:                         WHEN QUIT?   ETOH use: [ ] Yes  [X ] No              FREQUENCY / QUANTITY:  Ilicit Drug use:  [ ] Yes  [X] No  Occupation: Denies   Live with: Alone   Assist device use: Denies     Relevant Family History  FAMILY HISTORY:  No pertinent family history in first degree relatives    Review of Systems  GENERAL:  Fevers[X] chills[] sweats[] fatigue[] weight loss[] weight gain []                                        NEURO:  parathesias[] seizures []  syncope []  confusion []                                                                                  EYES: glasses[]  blurry vision[]  discharge[] pain[] glaucoma []                                                                            ENMT:  difficulty hearing []  vertigo[]  dysphagia[] epistaxis[] recent dental work []                                      CV:  chest pain[] palpitations[] OLIVIER [] diaphoresis [] edema[]                                                                                             RESPIRATORY:  wheezing[] SOB[] cough [] sputum[] hemoptysis[]                                                                    GI:  nausea[]  vomiting []  diarrhea[] constipation [] melena []                                                                        : hematuria[ ]  dysuria[ ] urgency[] incontinence[]                                                                                              MUSCULOSKELETAL  arthritis[ ]  joint swelling [ ] muscle weakness [ ]                                                                  SKIN/BREAST:  rash[ ] itching [ ]  hair loss[ ] masses[ ]                                                                                                PSYCH:  dementia [ ] depression [x ] anxiety[X ]                                                                                                                  HEME/LYMPH:  bruises easily[ ] enlarged lymph nodes[ ] tender lymph nodes[ ]                                                 ENDOCRINE:  cold intolerance[ ] heat intolerance[ ] polydipsia[ ]                                                                              PHYSICAL EXAM  Vital Signs Last 24 Hrs  T(C): 37.8 (15 Mar 2019 18:42), Max: 39.4 (15 Mar 2019 00:18)  T(F): 100 (15 Mar 2019 18:42), Max: 103 (15 Mar 2019 00:18)  HR: 82 (15 Mar 2019 18:42) (74 - 98)  BP: 105/66 (15 Mar 2019 18:42) (104/58 - 130/86)  BP(mean): --  RR: 18 (15 Mar 2019 18:42) (16 - 20)  SpO2: 95% (15 Mar 2019 18:42) (95% - 98%)      PATIENT REFUSED EXAM UNABLE TO PERFORM EXAM      General: Well nourished, well developed, no acute distress.                                                         Neuro: Normal exam oriented to person/place & time with no focal motor or sensory deficits.                    Eyes: Normal exam of conjunctiva & lids, pupils equally reactive.   ENT: Normal exam of nasal/oral mucosa with absence of cyanosis.   Neck: Normal exam of jugular veins, trachea & thyroid.   Chest: Normal lung exam with good air movement absence of wheezes, rales, or rhonchi:                                                                  CV:  Auscultation: normal [ ] S3[ ] S4[ ] Irregular [ ] Rub[ ] Clicks[ ]  Murmurs none:[ ]systolic [ ]  diastolic [ ] holosystolic [ ]  Carotids: No Bruits[ ] Other____________ Abdominal Aorta: normal [ ] nonpalpable[ ]                                                                         GI: Normal exam of abdomen, liver & spleen with no noted masses or tenderness.  (+) BS X 4 Quadrants, Nontender / Non Distended.                                                                                             Extremities: Normal no evidence of cyanosis or deformity Edema: none[ ]trace[ ]1+[ ]2+[ ]3+[ ]4+[ ]  Lower Extremity Pulses: Right[ ] Left[ ]Varicosities[ ]  SKIN                                                        LABS:                        9.0    9.7   )-----------( 53       ( 15 Mar 2019 07:04 )             27.2     03-15    135  |  98  |  28<H>  ----------------------------<  86  4.0   |  22  |  2.60<H>    Ca    8.2<L>      15 Mar 2019 17:25  Phos  3.8     -15  Mg     1.6     -15    TPro  7.8  /  Alb  2.8<L>  /  TBili  0.6  /  DBili  x   /  AST  68<H>  /  ALT  33  /  AlkPhos  104  03-14      Urinalysis Basic - ( 15 Mar 2019 10:19 )    Color: Dark Yellow / Appearance: Clear / S.013 / pH: x  Gluc: x / Ketone: Negative  / Bili: Negative / Urobili: Negative   Blood: x / Protein: 30 mg/dL / Nitrite: Negative   Leuk Esterase: Negative / RBC: 9 /hpf / WBC 2 /HPF   Sq Epi: x / Non Sq Epi: 1 /hpf / Bacteria: Negative

## 2019-03-15 NOTE — H&P ADULT - NSHPREVIEWOFSYSTEMS_GEN_ALL_CORE
REVIEW OF SYSTEMS    CONSTITUTIONAL:  Denies fevers or chills   HEENT: Denies blurry vision or nasal congestion   SKIN:  Denies rash or itching.  CARDIOVASCULAR:  Denies chest pain, KRYSTINA  RESPIRATORY:  Denies shortness of breath, cough or sputum.  GASTROINTESTINAL:  Denies abdominal pain, nausea, or vomiting, denies diarrhea   GENITOURINARY:  Denies any hematuria, dysuria  NEUROLOGICAL:  Denies headache or dizziness    Would not answer further ROS questions REVIEW OF SYSTEMS    CONSTITUTIONAL:  Denies fevers or chills   HEENT: Denies blurry vision or nasal congestion   SKIN:  Denies rash or itching.  CARDIOVASCULAR:  Denies chest pain, KRYSTINA  RESPIRATORY:  Denies shortness of breath, cough or sputum.  GASTROINTESTINAL:  Denies abdominal pain, nausea, or vomiting, denies diarrhea   GENITOURINARY:  Denies any hematuria, dysuria  NEUROLOGICAL:  Denies headache or dizziness  PSYCH: denies depression, anxiety, suicidal ideation    Patient then refused to answer further ROS questions

## 2019-03-15 NOTE — H&P ADULT - NSHPPHYSICALEXAM_GEN_ALL_CORE
Vital Signs Last 24 Hrs  T(C): 39.4 (15 Mar 2019 00:18), Max: 39.4 (15 Mar 2019 00:18)  T(F): 103 (15 Mar 2019 00:18), Max: 103 (15 Mar 2019 00:18)  HR: 98 (15 Mar 2019 00:18) (85 - 98)  BP: 130/86 (15 Mar 2019 00:18) (104/58 - 130/86)  BP(mean): --  RR: 20 (15 Mar 2019 00:18) (16 - 20)  SpO2: 98% (15 Mar 2019 00:18) (98% - 98%)      PHYSICAL EXAM:    GENERAL: Comfortable, no acute distress   HEAD:  Normocephalic, atraumatic  EYES: EOMI, PERRLA  HEENT: Moist mucous membranes  NECK: Supple, No JVD  NERVOUS SYSTEM: AAOx3, no focal neurologic deficits appreciated, moving all extremities   CHEST/LUNG: Clear to auscultation bilaterally  HEART: Regular rate and rhythm, no murmurs appreciated   ABDOMEN: Soft, Nontender, Nondistended, Bowel sounds present  EXTREMITIES: 1+ edema to shins bilaterally  MUSCULOSKELETAL: No muscle tenderness, no joint tenderness  SKIN: warm and dry, no rash Vital Signs Last 24 Hrs  T(C): 39.4 (15 Mar 2019 00:18), Max: 39.4 (15 Mar 2019 00:18)  T(F): 103 (15 Mar 2019 00:18), Max: 103 (15 Mar 2019 00:18)  HR: 98 (15 Mar 2019 00:18) (85 - 98)  BP: 130/86 (15 Mar 2019 00:18) (104/58 - 130/86)  BP(mean): --  RR: 20 (15 Mar 2019 00:18) (16 - 20)  SpO2: 98% (15 Mar 2019 00:18) (98% - 98%)      PHYSICAL EXAM:    GENERAL: Comfortable, no acute distress   HEAD:  Normocephalic, atraumatic  EYES: EOMI, PERRLA  HEENT: Moist mucous membranes  NECK: Supple, No JVD  NERVOUS SYSTEM: AAOx3, no focal neurologic deficits appreciated, moving all extremities   CHEST/LUNG: Clear to auscultation bilaterally  HEART: Regular rate and rhythm, no murmurs appreciated   ABDOMEN: Soft, Nontender, Nondistended, Bowel sounds present  EXTREMITIES: 1+ edema to shins bilaterally  MUSCULOSKELETAL: No muscle tenderness, no joint tenderness  SKIN: warm and dry, no rash  PSYCH: calm, denies suicidal ideation

## 2019-03-15 NOTE — CHART NOTE - NSCHARTNOTEFT_GEN_A_CORE
This is a 64 y/o M with a PMH significant for endocarditis (on ceftriaxone and rifampin), hypothyroidism, BPH, history of seizures, and HTN who presents to the hospital from rehab for suicidal ideations, admitted for Fever T103, JANEL and delusions.   CTH done. Neuroradiology called:    Low-density in the right occipital lobe suspicious for a recent infarct.   Given history of endocarditis, the infarct may be embolic in nature.    Pt denies headache, dizziness, chest pain, extremity weakness or numbness.  PE unremarkable. VSS  A/P Embolic infarct to right occipital lobe              -Tele              -House Neurology called              -CTS to be called for mitral valve vegetations              -Thrombocytopenia 58 makes ac difficulty. This is a 62 y/o M with a PMH significant for endocarditis (on ceftriaxone and rifampin), hypothyroidism, BPH, history of seizures, and HTN who presents to the hospital from rehab for suicidal ideations, admitted for Fever T103, JANEL and delusions.   CTH done. Neuroradiology called:    Low-density in the right occipital lobe suspicious for a recent infarct.   Given history of endocarditis, the infarct may be embolic in nature.    Pt denies headache, dizziness, chest pain, extremity weakness or numbness.  PE unremarkable. VSS  A/P Embolic infarct to right occipital lobe              -Tele              -House Neurology called              -CTS to be called for mitral valve vegetations              -Thrombocytopenia 58 makes ac difficulty.  Attending notified

## 2019-03-15 NOTE — CONSULT NOTE ADULT - ASSESSMENT
Impression: Right occipital stroke likely embolic in the setting of endocarditis.     Plan:  -MRI brain w/o contrast  -MRA head w/o contrast  -MRA neck w/ contrast  -c/w antibiotics for treatment of endocarditis per infectious disease recommendations  -if planned for cardiac surgery for removal of infected valve, patient will need conventional cerebral angiogram prior to cardiac surgery to r/o mycotic aneurysms

## 2019-03-15 NOTE — BEHAVIORAL HEALTH ASSESSMENT NOTE - NSBHCHARTREVIEWLAB_PSY_A_CORE FT
9.0    9.7   )-----------( 53       ( 15 Mar 2019 07:04 )             27.2     03-15    134<L>  |  98  |  30<H>  ----------------------------<  92  3.9   |  22  |  2.80<H>    Ca    8.2<L>      15 Mar 2019 07:04  Phos  3.8     03-15  Mg     1.6     03-15    TPro  7.8  /  Alb  2.8<L>  /  TBili  0.6  /  DBili  x   /  AST  68<H>  /  ALT  33  /  AlkPhos  104  03-14

## 2019-03-15 NOTE — H&P ADULT - NSHPLABSRESULTS_GEN_ALL_CORE
LABORATORY                          10.9   10.6  )-----------( 58       ( 14 Mar 2019 22:31 )             33.2       03-14    135  |  97  |  32<H>  ----------------------------<  95  4.1   |  21<L>  |  3.14<H>    Ca    8.8      14 Mar 2019 22:31    TPro  7.8  /  Alb  2.8<L>  /  TBili  0.6  /  DBili  x   /  AST  68<H>  /  ALT  33  /  AlkPhos  104  03-14      Blood Gas Venous - Lactate (03.15.19 @ 00:44)    Blood Gas Venous - Lactate: 1.6 mmoL/L      EKG: NSR at 85 bpm, no ST or T wave changes appreciated EKG, labs, and imaging personally reviewed and interpreted.     LABORATORY                        10.9   10.6  )-----------( 58       ( 14 Mar 2019 22:31 )             33.2     135  |  97  |  32<H>  ----------------------------<  95  4.1   |  21<L>  |  3.14<H>    Ca    8.8      14 Mar 2019 22:31    TPro  7.8  /  Alb  2.8<L>  /  TBili  0.6  /  DBili  x   /  AST  68<H>  /  ALT  33  /  AlkPhos  104  03-14    Blood Gas Venous - Lactate (03.15.19 @ 00:44)    Blood Gas Venous - Lactate: 1.6 mmoL/L      EKG: NSR at 85 bpm, no ST or T wave changes appreciated    Imaging:  TTE and CXR pending    Care discussed with other providers: Yes  Consult notes reviewed: Yes  Outside records reviewed: Yes

## 2019-03-15 NOTE — ED ADULT NURSE REASSESSMENT NOTE - NS ED NURSE REASSESS COMMENT FT1
attempted to straight cath pt, but he refused, ER MD, Dr. Patterson was made aware and notified.
pt. has been admitted to medicine for JANEL (acute kidney injury). waiting for admission orders @ this time. pt. was made aware of the treatment plan.
pt is easily irritated when his am meds  was offered, he took all of them after he was verbally redirected. he was instructed that urine sample is needed, but still unable to urinate @ this time.

## 2019-03-15 NOTE — CONSULT NOTE ADULT - SUBJECTIVE AND OBJECTIVE BOX
HPI:  63M PMH significant for endocarditis (on ceftriaxone and rifampin), hypothyroidism, BPH, history of seizures, and HTN who presents to the hospital from rehab for suicidal ideations. Per ED and nursing home documentation, the patient has been non compliant with his medications since coming to rehab. Today, he told someone from the staff that he wanted to kill himself so he was brought to the ED. When seen, the patient repeatedly stated that he was upset that at [place] whenever he ate food, he would become nauseous and vomit. He believed there was a "bacteria contaminating the food" and had requested several people to address the issue but no one had done this.  He was frustrated by this and told the staff that he wanted to kill himself because he wanted to get out of the nursing home. When asked about his previous hospital course, he continues to talk about the "bacteria in the food" at [place]. He is AAOx3 otherwise.      Team spoke with brother Renee to get collateral information (). He states that his brother has a history of a nephrectomy (2/2 cancer, did not receive chemo), HTN, hypothyroidism, s/p 2 heart valve replacements (does not know which valves), and seizures. The patient's last heart valve replacement surgery was 2 year ago and he had been doing well. However, about 1 month ago the patient's brother went to go see him at his home and found the patient unresponsive on the ground. He was breathing and had a pulse but he called EMS who brought him to [facility] and was subsequently transferred to [place]. While at [place], he was found to have endocarditis with a vegetation on one of his valves. Per the brother, the plan was to treat him with antibiotics for 1 month and then reassess. He was sent to rehab 3 days ago. The brother states he does not have any history of psychiatric issues but the patient told him he told the staff at rehab that he was suicidal because no one was addressing his concern regarding the "bacteria in the food". He does admit to missing a few doses of antibiotics.     In the ED, the patient had a fever, leukocytosis 10.6. Patient states he does not know why he is here and became frustrated that we are asking why he came to the hospital. He recalls being at [facility], but cannot give any further details. He remains with a right arm picc that does not bother him. He currently has no complaints, though stated that prior to coming to the hospital was having nauseas, vomiting, and diarrhea. He lives by himself. He stated he had a dog in the past, but currently does not have any pets. He denies recent travel. He was born in [place], he recalls always being negative for TB.    PAST MEDICAL & SURGICAL HISTORY:  Hypothyroidism  Congestive heart failure (CHF)  Hypertension  Seizures  BPH (benign prostatic hypertrophy)  S/p nephrectomy  History of heart valve replacement    Allergies    penicillin (Swelling)    Intolerances    ANTIMICROBIALS:  cefepime   IVPB 2000 every 24 hours  rifampin 300 three times a day  vancomycin  IVPB 1000 once    OTHER MEDS:  atorvastatin 40 milliGRAM(s) Oral at bedtime  carvedilol 12.5 milliGRAM(s) Oral every 12 hours  hydrALAZINE 10 milliGRAM(s) Oral three times a day  isosorbide   dinitrate Tablet (ISORDIL) 10 milliGRAM(s) Oral three times a day  levETIRAcetam 500 milliGRAM(s) Oral two times a day  levothyroxine 112 MICROGram(s) Oral daily  senna 2 Tablet(s) Oral at bedtime  sodium bicarbonate 650 milliGRAM(s) Oral two times a day  tamsulosin 0.4 milliGRAM(s) Oral at bedtime    SOCIAL HISTORY: Denies smoking, denies alcohol    FAMILY HISTORY:  Mother and Father with HTN    Drug Dosing Weight  Height (cm): 180.34 (14 Mar 2019 20:52)  Weight (kg): 88.5 (14 Mar 2019 20:52)  BMI (kg/m2): 27.2 (14 Mar 2019 20:52)  BSA (m2): 2.09 (14 Mar 2019 20:52)    PE:    Vital Signs Last 24 Hrs  T(C): 37.1 (15 Mar 2019 09:21), Max: 39.4 (15 Mar 2019 00:18)  T(F): 98.8 (15 Mar 2019 09:21), Max: 103 (15 Mar 2019 00:18)  HR: 74 (15 Mar 2019 09:21) (74 - 98)  BP: 128/75 (15 Mar 2019 09:21) (104/58 - 130/86)  BP(mean): --  RR: 18 (15 Mar 2019 09:21) (16 - 20)  SpO2: 96% (15 Mar 2019 09:21) (96% - 98%)    Gen: Awake, alert, NAD, non-toxic, agitated  CV: S1+S2 normal, no murmurs  Resp: Clear bilat, no resp distress  Abd: Soft, nontender, +BS  Ext: No LE edema, no wounds  : No Griffith  IV/Skin: No thrombophlebitis, right arm picc line intact  Msk: No low back pain, no arthralgias, no joint swelling  Neuro: No sensory deficits, no motor deficits    LABS:                          9.0    9.7   )-----------( 53       ( 15 Mar 2019 07:04 )             27.2       03-15    134<L>  |  98  |  30<H>  ----------------------------<  92  3.9   |  22  |  2.80<H>    Ca    8.2<L>      15 Mar 2019 07:04  Phos  3.8     03-15  Mg     1.6     03-15    TPro  7.8  /  Alb  2.8<L>  /  TBili  0.6  /  DBili  x   /  AST  68<H>  /  ALT  33  /  AlkPhos  104  03-14      Urinalysis Basic - ( 15 Mar 2019 10:19 )    Color: Dark Yellow / Appearance: Clear / S.013 / pH: x  Gluc: x / Ketone: Negative  / Bili: Negative / Urobili: Negative   Blood: x / Protein: 30 mg/dL / Nitrite: Negative   Leuk Esterase: Negative / RBC: 9 /hpf / WBC 2 /HPF   Sq Epi: x / Non Sq Epi: 1 /hpf / Bacteria: Negative    MICROBIOLOGY:  v  RADIOLOGY:    CT C/A/P pending  CT Head pending

## 2019-03-15 NOTE — CHART NOTE - NSCHARTNOTEFT_GEN_A_CORE
Interval events  Patient s/p Chest pain during jaylen, trop 500 at that time with no EKG changes  Repeat trop 488 at 2030, patient asymptomatic, no c/o chest pain  Will continue current management  repeat EKG if pt c/o CP  C/W tele monitoring  Next trop am  Discussed with cardiology fellow  Will follow    Roly Syed [facility] [place]  [id number]

## 2019-03-15 NOTE — BEHAVIORAL HEALTH ASSESSMENT NOTE - CASE SUMMARY
This is a 63-y.o. AAM pt, domiciled in [place], with no formal psychiatric history, no SA, no substance abuse, with PMHx significant for endocarditis (on ceftriaxone and rifampin), hypothyroidism, BPH, history of seizures, and HTN who presents to the hospital from rehab for suicidal hints. Per ED and nursing home documentation, the patient has been non compliant with his medications since coming to rehab. He believed there was a "bacteria contaminating the food" and had requested several people to address the issue but no one had done this.  He was frustrated by this and told the staff that he wanted to kill himself because he wanted to get out of the nursing home. Psychiatry consulted to assess for psychosis, delirium and suicidality.    I have seen and evaluated this patient myself. Chart, labs, meds reviewed. I agree with NP's assessment and plan. Patient presents with clouding of sensorium and confusion indicative of delirium.

## 2019-03-15 NOTE — BEHAVIORAL HEALTH ASSESSMENT NOTE - SUMMARY
62y/o black male, domiciled in [place], with no formal psychiatric history, no SA, no substance abuse, with PMHx significant for endocarditis (on ceftriaxone and rifampin), hypothyroidism, BPH, history of seizures, and HTN who presents to the hospital from rehab for suicidal ideations. Per ED and nursing home documentation, the patient has been non compliant with his medications since coming to rehab. PTA to ED, patient told someone from the staff that he wanted to kill himself so he was brought to the ED. When seen, the patient repeatedly stated that he was upset that at [place] whenever he ate food, he would become nauseous and vomit. He believed there was a "bacteria contaminating the food" and had requested several people to address the issue but no one had done this.  He was frustrated by this and told the staff that he wanted to kill himself because he wanted to get out of the nursing home. When asked about his previous hospital course, he continues to talk about the "bacteria in the food" at [place].  Psychiatry consulted to assess for psychosis, delirium and suicidality. 62y/o black male, domiciled in [place], with no formal psychiatric history, no SA, no substance abuse, with PMHx significant for endocarditis (on ceftriaxone and rifampin), hypothyroidism, BPH, history of seizures, and HTN who presents to the hospital from rehab for suicidal ideations. Per ED and nursing home documentation, the patient has been non compliant with his medications since coming to rehab. PTA to ED, patient told someone from the staff that he wanted to kill himself so he was brought to the ED. When seen, the patient repeatedly stated that he was upset that at [place] whenever he ate food, he would become nauseous and vomit. He believed there was a "bacteria contaminating the food" and had requested several people to address the issue but no one had done this.  He was frustrated by this and told the staff that he wanted to kill himself because he wanted to get out of the nursing home. When asked about his previous hospital course, he continues to talk about the "bacteria in the food" at [place].  Psychiatry consulted to assess for psychosis, delirium and suicidality.  Patient seen and evaluated, disoriented, states he is at [facility], not able to state the day, perseverates on how he believes his food was "being contaminated" at [place] and when he brought this to the attention of staff, reports that they neglected to look into this and made reports that he was suicidal.  Denies making any suicidal statements, denies any recent or current SI, denies A/V/H, however continues to perseverate on the contamination of his food at [place].  Denies feeling confused, denies memory impairments.  Reports good sleep, reports recently that his appetite has been poor.  Awaiting for call back from brother for collateral.

## 2019-03-15 NOTE — H&P ADULT - NSICDXPASTMEDICALHX_GEN_ALL_CORE_FT
PAST MEDICAL HISTORY:  BPH (benign prostatic hypertrophy)     Congestive heart failure (CHF)     Hypertension     Hypothyroidism     Seizures

## 2019-03-15 NOTE — H&P ADULT - NSHPSOCIALHISTORY_GEN_ALL_CORE
The patient was living at home by himself. He used to work as an . Denies tobacco, alcohol, or recreational drug use.

## 2019-03-15 NOTE — CHART NOTE - NSCHARTNOTEFT_GEN_A_CORE
Patient was seen and examined by me at bedside.      VSS    general NAD patient is confused  HEENT MMM  Cardio RRR no auscultated murmur   Lung CTAB  GI soft +bs nontender    1. Endocarditis- I spoke to attending physician at [facility] I expedited a verbal signout.  As per him, patient was found to have endocarditis of the BIOPROSTHETIC MITRAL VALVE.  Valve lesions 0.6 x 1 cm and 0.5 x 0.4 cm.  Blood cultures were negative and patient was treated for presumed culture negative endocarditis with Ceftriaxone and Rifampin.    Patient febrile in ED  -ID consulted appreciate recs.   -Cardiology and CT surgery consult placed.  -Echocardiogram expedited     2. Occipital lobe density is concerning for Embolic stroke from known endocarditis.  This can possibly be an explanation of patients confusion.   Neurology consultation placed.      3.  Chronic systolic heart failure- patient with known EF% of 20-25 as per [place] signout  Appears close to euvolemic currently would continue home regimen for now.      Prognosis is extremely guarded.  Patient placed on Tele monitoring.  Await echocardiogram results and official consultation recs Patient was seen and examined by me at bedside.      VSS    general NAD patient is confused  HEENT MMM  Cardio RRR no auscultated murmur   Lung CTAB  GI soft +bs nontender    1. Endocarditis- I spoke to attending physician at [facility] I expedited a verbal signout.  As per him, patient was found to have endocarditis of the BIOPROSTHETIC MITRAL VALVE.  Valve lesions 0.6 x 1 cm and 0.5 x 0.4 cm.  Blood cultures were negative and patient was treated for presumed culture negative endocarditis with Ceftriaxone and Rifampin.    Patient febrile in ED  -ID consulted appreciate recs.   -Cardiology and CT surgery consult placed.  -Echocardiogram expedited     2. Occipital lobe density is concerning for Embolic stroke from known endocarditis.  This can possibly be an explanation of patients confusion.   Neurology consultation placed.      3.  Chronic systolic heart failure- patient with known EF% of 20-25 as per [place] signout  Appears close to euvolemic currently would continue home regimen for now.      CREATININE AND PLATLETS ARE CLOSE TO PATIENTS BASELINE      Prognosis is extremely guarded.  Patient placed on Tele monitoring.  Await echocardiogram results and official consultation recs

## 2019-03-15 NOTE — CONSULT NOTE ADULT - ASSESSMENT
64 yo M with a PMH significant for prosthetic valve endocarditis(on ceftriaxone and rifampin), HFrEF?, hypothyroidism, BPH, history of seizures, and HTN who was sent to the hospital from rehab due to suicidal ideation. Cardiology consulted for endocarditis management given fever.    #Prosthetic valve endocarditis  Reported history of endocarditis with ceftriaxone and rifampin as outpatient.   - Please get records from [facility] regarding diagnosis  - repeat TTE  - continue outpatient Abx: ceftriaxone, rifampin  - will attempt to contact outpatient cardiologist Dr Lachman to discuss case    #HFrEF  Not volume overloaded on exam.  - repeat TTE  - continue outpatient medications: atorvastatin 40mg, coreg 12.5mg BID, Isordil 10mg TID, hydralazine 10mg TID, torsemide 20mg daily

## 2019-03-15 NOTE — BEHAVIORAL HEALTH ASSESSMENT NOTE - RISK ASSESSMENT
Risk factors: +acute/chronic medical conditions, acute/chronic cognitive impairments, chronic pain, not receiving treatment, delusions, recent passive suicidal statements     Protective factors: no current SIIP/HIIP, no h/o SA/SIB, no h/o psych admissions, no active substance abuse, domiciled, social supports, positive therapeutic relationship    Overall, pt is a low risk of harm to self/others.

## 2019-03-15 NOTE — CONSULT NOTE ADULT - ATTENDING COMMENTS
63 year old man with prosthetic valve endocarditis evaluated by his cardiac surgeon at [place] and discharged to nursing home on IV antibiotics with plan to consider surgery in one month if does not clear cultures. Sent here from nursing home due to suicidal ideation. Continue antibiotic regimen and if fevers persist should arrange transfer to his surgeon at [place].
PAtient was seen and examines with PA/Resident.   On Exam AAOX3 . Speech is fluent with no aphasia.  PERRL. HAs b/l cataracts.  VF has a Left Homonymous hemianopsia.  Face is symmetric.  Motor 5/5 No drift. in Upper/Lower extremities  Power on left is 5-/5  Cerebellar FNF is OK    IMP   R Occipital subacute infarct.    Agree with recs as above.  Continue Antibiotics per ID.  - Needs MRI/ MRA Head and Neck.  - Would need cerebral angiogram if cardiac surgery is planned.  - Keep SBP -normotensive.  - Statin for LDL goal of < 70  - No need for antiplatelets given risk of hemorrhagic conversion in the setting of endocarditis.
Pt seen and examined. Agree with above
Aris Wells MD  Pager [phone]  After 5pm/weekends call [phone]

## 2019-03-15 NOTE — CONSULT NOTE ADULT - SUBJECTIVE AND OBJECTIVE BOX
Patient seen and evaluated at bedside    Chief Complaint:    HPI:  Mr Linares is a 64 yo M with a PMH significant for prosthetic valve endocarditis(on ceftriaxone and rifampin), hypothyroidism, BPH, history of seizures, and HTN who was sent to the hospital from rehab due to suicidal ideation. Patient has apparently been noncompliant with medications while at the rehab and expressed suicidal ideation to a staff member. On arrival to the ED, he was found to be febrile to 103. Cardiology consulted for help with management of endocarditis.      In the ED, VS significant for Tmax 103, HR: 85, BP: 104/58, RR: 16 sating 98% on RA. (15 Mar 2019 04:03)      PMHx:   Hypothyroidism  Congestive heart failure (CHF)  Hypertension  Seizures  BPH (benign prostatic hypertrophy)      PSHx:   S/p nephrectomy  History of heart valve replacement  No significant past surgical history      Allergies:  penicillin (Swelling)      Home Meds:    Current Medications:   atorvastatin 40 milliGRAM(s) Oral at bedtime  carvedilol 12.5 milliGRAM(s) Oral every 12 hours  cefTRIAXone   IVPB 2 Gram(s) IV Intermittent every 24 hours  hydrALAZINE 10 milliGRAM(s) Oral three times a day  isosorbide   dinitrate Tablet (ISORDIL) 10 milliGRAM(s) Oral three times a day  levETIRAcetam 500 milliGRAM(s) Oral two times a day  levothyroxine 112 MICROGram(s) Oral daily  rifampin 300 milliGRAM(s) Oral three times a day  senna 2 Tablet(s) Oral at bedtime  sodium bicarbonate 650 milliGRAM(s) Oral two times a day  tamsulosin 0.4 milliGRAM(s) Oral at bedtime      FAMILY HISTORY:  No pertinent family history in first degree relatives      Social History:  Smoking History:  Alcohol Use:  Drug Use:    REVIEW OF SYSTEMS:  CONSTITUTIONAL: No weakness, fevers or chills  EYES/ENT: No visual changes;  No dysphagia  NECK: No pain or stiffness  RESPIRATORY: No cough, wheezing, hemoptysis; No shortness of breath  CARDIOVASCULAR: No chest pain or palpitations; No lower extremity edema  GASTROINTESTINAL: No abdominal or epigastric pain. No nausea, vomiting, or hematemesis; No diarrhea or constipation. No melena or hematochezia.  BACK: No back pain  GENITOURINARY: No dysuria, frequency or hematuria  NEUROLOGICAL: No numbness or weakness  SKIN: No itching, burning, rashes, or lesions   All other review of systems is negative unless indicated above.    Physical Exam:  T(F): 98.8 (03-15), Max: 103 (03-15)  HR: 74 (03-15) (74 - 98)  BP: 128/75 (03-15) (104/58 - 130/86)  RR: 18 (03-15)  SpO2: 96% (03-15)  GENERAL: No acute distress, well-developed  HEAD:  Atraumatic, Normocephalic  ENT: EOMI, PERRLA, conjunctiva and sclera clear, Neck supple, No JVD, moist mucosa  CHEST/LUNG: Clear to auscultation bilaterally; No wheeze, equal breath sounds bilaterally   BACK: No spinal tenderness  HEART: Regular rate and rhythm; No murmurs, rubs, or gallops  ABDOMEN: Soft, Nontender, Nondistended; Bowel sounds present  EXTREMITIES:  No clubbing, cyanosis, or edema  PSYCH: Nl behavior, nl affect  NEUROLOGY: AAOx3, non-focal, cranial nerves intact  SKIN: Normal color, No rashes or lesions    Cardiovascular Diagnostic Testing:    ECG: Personally reviewed:    Imaging:    CXR: Personally reviewed    Labs: Personally reviewed                        9.0    9.7   )-----------( 53       ( 15 Mar 2019 07:04 )             27.2     03-15    134<L>  |  98  |  30<H>  ----------------------------<  92  3.9   |  22  |  2.80<H>    Ca    8.2<L>      15 Mar 2019 07:04  Phos  3.8     03-15  Mg     1.6     03-15    TPro  7.8  /  Alb  2.8<L>  /  TBili  0.6  /  DBili  x   /  AST  68<H>  /  ALT  33  /  AlkPhos  104  03-14 Patient seen and evaluated at bedside    Chief Complaint: consult for endocarditis    HPI:  Mr Linares is a 64 yo M with a PMH significant for prosthetic valve endocarditis(on ceftriaxone and rifampin), hypothyroidism, BPH, history of seizures, and HTN who was sent to the hospital from rehab due to suicidal ideation. Patient has apparently been noncompliant with medications while at the rehab and expressed suicidal ideation to a staff member. On arrival to the ED, he was found to be febrile to 103. Cardiology consulted for help with management of endocarditis. Patient did not want to provide any history during my evaluation and instead asked me to call his cardiologist. Some history obtained from chart review.    Cardiology: Dr Lachman    PMHx:   Hypothyroidism  Congestive heart failure (CHF)  Hypertension  Seizures  BPH (benign prostatic hypertrophy)      PSHx:   S/p nephrectomy  History of heart valve replacement  No significant past surgical history      Allergies:  penicillin (Swelling)    Home Meds:  atorvastatin 40mg  coreg 12.5mg BID  ceftriaxone 2g IV daily  rifampin 300mg TID  Isordil 10mg TID  hydralazine 10mg TID  levetiracetam 500mg BID  senna  torsemide 20mg daily  levothyroxine 112mcg  sodium bicarb 650mg BID  tamsulosin 0.4mg daily  betadine    Current Medications:   atorvastatin 40 milliGRAM(s) Oral at bedtime  carvedilol 12.5 milliGRAM(s) Oral every 12 hours  cefTRIAXone   IVPB 2 Gram(s) IV Intermittent every 24 hours  hydrALAZINE 10 milliGRAM(s) Oral three times a day  isosorbide   dinitrate Tablet (ISORDIL) 10 milliGRAM(s) Oral three times a day  levETIRAcetam 500 milliGRAM(s) Oral two times a day  levothyroxine 112 MICROGram(s) Oral daily  rifampin 300 milliGRAM(s) Oral three times a day  senna 2 Tablet(s) Oral at bedtime  sodium bicarbonate 650 milliGRAM(s) Oral two times a day  tamsulosin 0.4 milliGRAM(s) Oral at bedtime      FAMILY HISTORY:  No pertinent family history in first degree relatives      Social History:  Smoking History: denies  Alcohol Use: denies  Drug Use: denies    REVIEW OF SYSTEMS:  CONSTITUTIONAL: No weakness, fevers or chills  EYES/ENT: No visual changes;  No dysphagia  NECK: No pain or stiffness  RESPIRATORY: No cough, wheezing, hemoptysis; No shortness of breath  CARDIOVASCULAR: No chest pain or palpitations; No lower extremity edema  GASTROINTESTINAL: No abdominal or epigastric pain. No nausea, vomiting, or hematemesis; No diarrhea or constipation. No melena or hematochezia.  BACK: No back pain  GENITOURINARY: No dysuria, frequency or hematuria  NEUROLOGICAL: No numbness or weakness  SKIN: No itching, burning, rashes, or lesions   All other review of systems is negative unless indicated above.    Physical Exam:  T(F): 98.8 (03-15), Max: 103 (03-15)  HR: 74 (03-15) (74 - 98)  BP: 128/75 (03-15) (104/58 - 130/86)  RR: 18 (03-15)  SpO2: 96% (03-15)  GENERAL: No acute distress, well-developed  HEAD:  Atraumatic, Normocephalic  ENT: EOMI, PERRLA, conjunctiva and sclera clear, Neck supple, No JVD, moist mucosa  CHEST/LUNG: Clear to auscultation bilaterally anteriorly; No wheeze, equal breath sounds bilaterally   HEART: Regular rate and rhythm; No murmurs, rubs, or gallops  ABDOMEN: Soft, Nontender, Nondistended; Bowel sounds present  EXTREMITIES:  No clubbing, cyanosis, or edema  PSYCH: strange affect  NEUROLOGY: AAOx3, non-focal, cranial nerves intact  SKIN: Normal color, No rashes or lesions    Cardiovascular Diagnostic Testing:    ECG: Personally reviewed: NSR    Labs: Personally reviewed                        9.0    9.7   )-----------( 53       ( 15 Mar 2019 07:04 )             27.2     03-15    134<L>  |  98  |  30<H>  ----------------------------<  92  3.9   |  22  |  2.80<H>    Ca    8.2<L>      15 Mar 2019 07:04  Phos  3.8     03-15  Mg     1.6     03-15    TPro  7.8  /  Alb  2.8<L>  /  TBili  0.6  /  DBili  x   /  AST  68<H>  /  ALT  33  /  AlkPhos  104  03-14

## 2019-03-16 NOTE — PROGRESS NOTE ADULT - SUBJECTIVE AND OBJECTIVE BOX
CC: Patient is a 63y old  Male who presents with a chief complaint of JANEL (16 Mar 2019 12:54)    ID following for endocarditis    Interval History/ROS: Patient has no new complaints. Denies fever, chills. Was febrile overnight. CT Head with suspicion for recent infarct given mitral valve vegetations.    Rest of ROS negative.    Allergies  penicillin (Swelling)    ANTIMICROBIALS:  cefepime   IVPB 2000 every 24 hours  rifampin 300 three times a day  vancomycin  IVPB      OTHER MEDS:  atorvastatin 40 milliGRAM(s) Oral at bedtime  carvedilol 12.5 milliGRAM(s) Oral every 12 hours  hydrALAZINE 10 milliGRAM(s) Oral three times a day  isosorbide   dinitrate Tablet (ISORDIL) 10 milliGRAM(s) Oral three times a day  levETIRAcetam 500 milliGRAM(s) Oral two times a day  levothyroxine 112 MICROGram(s) Oral daily  senna 2 Tablet(s) Oral at bedtime  sodium bicarbonate 650 milliGRAM(s) Oral two times a day  tamsulosin 0.4 milliGRAM(s) Oral at bedtime  torsemide 20 milliGRAM(s) Oral daily    PE:    Vital Signs Last 24 Hrs  T(C): 36.6 (16 Mar 2019 11:23), Max: 38.1 (16 Mar 2019 00:11)  T(F): 97.8 (16 Mar 2019 11:23), Max: 100.5 (16 Mar 2019 00:11)  HR: 67 (16 Mar 2019 13:21) (67 - 82)  BP: 108/69 (16 Mar 2019 13:21) (105/66 - 125/73)  BP(mean): --  RR: 18 (16 Mar 2019 11:23) (18 - 18)  SpO2: 97% (16 Mar 2019 11:23) (95% - 100%)    Gen: Awake, alert, NAD  CV: S1+S2 normal, no murmurs  Resp: Clear bilat, no resp distress  Abd: Soft, nontender, +BS  Ext: No LE edema, no wounds  : No Griffith  IV/Skin: No thrombophlebitis  Neuro: no focal deficits    LABS:                          8.4    8.80  )-----------( 74       ( 16 Mar 2019 11:17 )             26.8       03-16    135  |  100  |  26<H>  ----------------------------<  81  4.3   |  20<L>  |  2.41<H>    Ca    8.3<L>      16 Mar 2019 05:24  Phos  3.0       Mg     2.2         TPro  6.4  /  Alb  2.1<L>  /  TBili  0.5  /  DBili  x   /  AST  47<H>  /  ALT  22  /  AlkPhos  80        Urinalysis Basic - ( 15 Mar 2019 10:19 )    Color: Dark Yellow / Appearance: Clear / S.013 / pH: x  Gluc: x / Ketone: Negative  / Bili: Negative / Urobili: Negative   Blood: x / Protein: 30 mg/dL / Nitrite: Negative   Leuk Esterase: Negative / RBC: 9 /hpf / WBC 2 /HPF   Sq Epi: x / Non Sq Epi: 1 /hpf / Bacteria: Negative    MICROBIOLOGY:  Vancomycin Level, Random: 10.0 ug/mL (19 @ 05:25)  v  .Blood Blood-Peripheral  03-15-19   No growth to date.  --  --    RADIOLOGY:    < from: CT Abdomen and Pelvis w/ Oral Cont (03.15.19 @ 13:24) >  IMPRESSION:   Subsegmental atelectasis versus pneumonia in the left lower lobe.     No acute intra-abdominal pathology.    < end of copied text >    < from: CT Head No Cont (03.15.19 @ 13:24) >  IMPRESSION:    Low-density in the right occipital lobe suspicious for a recent infarct.   Given history of endocarditis, the infarct may be embolic in nature.    < end of copied text >

## 2019-03-16 NOTE — PROVIDER CONTACT NOTE (OTHER) - ASSESSMENT
Patient alert & oriented x 2-3, remaining VS stable (see vitals flow sheet). Patient denies chills, sweats.

## 2019-03-16 NOTE — CHART NOTE - NSCHARTNOTEFT_GEN_A_CORE
Interval events    Tempearture 100.6 F    Vital Signs Last 24 Hrs  T(C): 38.1 (16 Mar 2019 00:11), Max: 38.1 (16 Mar 2019 00:11)  T(F): 100.5 (16 Mar 2019 00:11), Max: 100.5 (16 Mar 2019 00:11)  HR: 75 (16 Mar 2019 00:11) (74 - 82)  BP: 122/70 (16 Mar 2019 00:11) (105/66 - 128/75)  BP(mean): --  RR: 18 (16 Mar 2019 00:11) (18 - 20)  SpO2: 100% (16 Mar 2019 00:11) (95% - 100%)                          9.0    9.7   )-----------( 53       ( 15 Mar 2019 07:04 )             27.2     This is a 62 y/o M with a PMH significant for endocarditis (on ceftriaxone and rifampin), hypothyroidism, BPH, history of seizures, and HTN who presents to the hospital from rehab for suicidal ideations, admitted for Fever T103, JANEL and delusions.   CTH done. Embolic infarct to right occipital lobe,   Now with ever of 100.6 F  Likely r/t Endocarditis  C/W Cefepime  F/U Bcx  lactic normal  F/U with ID in am  Will follow    Roly Syed [facility] [place]  [id number] Interval events    Temperature 100.6 F  5 beats of WCT    Vital Signs Last 24 Hrs  T(C): 38.1 (16 Mar 2019 00:11), Max: 38.1 (16 Mar 2019 00:11)  T(F): 100.5 (16 Mar 2019 00:11), Max: 100.5 (16 Mar 2019 00:11)  HR: 75 (16 Mar 2019 00:11) (74 - 82)  BP: 122/70 (16 Mar 2019 00:11) (105/66 - 128/75)  BP(mean): --  RR: 18 (16 Mar 2019 00:11) (18 - 20)  SpO2: 100% (16 Mar 2019 00:11) (95% - 100%)                          9.0    9.7   )-----------( 53       ( 15 Mar 2019 07:04 )             27.2     This is a 62 y/o M with a PMH significant for endocarditis (on ceftriaxone and rifampin), hypothyroidism, BPH, history of seizures, and HTN who presents to the hospital from rehab for suicidal ideations, admitted for Fever T103, JANEL and delusions.   CTH done. Embolic infarct to right occipital lobe,   Now with ever of 100.6 F/unsustained WCT  Likely r/t Endocarditis  C/W Cefepime  F/U Bcx  lactic normal  F/U with ID in am  C/W BB, Electrolytes supplemented  Will follow    Roly Syed [facility] [place]  [id number]

## 2019-03-16 NOTE — PROVIDER CONTACT NOTE (OTHER) - ASSESSMENT
Patient is alert & oriented x 2-3, VS stable; /80, HR 82 NSR, SpO2 98% RA, RR 18. Patient is alert & oriented x 2-3, VS stable; /80, HR 82 NSR, SpO2 98% RA, RR 18. Patient asymptomatic, denies chest pain, palpitations, lightheadedness, dizziness.

## 2019-03-16 NOTE — PROGRESS NOTE ADULT - ASSESSMENT
63 year old male with recent admission for endocarditis discharged on ceftriaxone and rifampin via right arm picc, hypothyroidism, ?nephrectomy, BPH, history of seizures, and HTN who presents to the hospital from rehab for suicidal ideations.     At NH, he told someone from the staff that he wanted to kill himself so he was brought to the ED  s/p 2 heart valve replacements (Aortic and mitral valve), last replacement was 2 year ago     While at [facility], found to have endocarditis on ceftriaxone and rifampin - was reported as culture negative endocarditis    Right arm picc   Febrile overnight  TTE with prosthetic mitral valve with vegetation, has also prosthetic aortic valve  CT head concerning for septic emboli  WBC decreasing, anemia, thrombocytopenia  Renal function improving    Recommend:  -Continue with vancomycin and cefepime renally dosed  -Continue rifampin  -Will need to obtain chart from [facility]  -CARLY  -Agree with MRI head and MRA H/N  -Agree with viral workup - CMV/ EBV/ parvovirus/ HIV    For Culture negative endocarditis, would F/U:  -Quant TB Gold  -Bartonella serologies  -Q fever serologies  -Urine legionella  -Brucella serologies  -Chlamydia serologies

## 2019-03-16 NOTE — PROGRESS NOTE ADULT - SUBJECTIVE AND OBJECTIVE BOX
Dr. Garret Sue   Internal Medicine PGY-1  Pager #[phone]    Patient is a 63y old  Male who presents with a chief complaint of JANEL (15 Mar 2019 19:59)      SUBJECTIVE / OVERNIGHT EVENTS:  AAOx2. Does not know what hospital he is in or most of his medical conditions. Per prior documentation, brother notes this to be an acute change in mental status as patient does not have an underlying psychiatric condition. Patient denies focal weakness or sensory deficits. Notes significant difficulty starting urination. Denies subjective fevers, sweats, or chills (Tm 103).     MEDICATIONS  (STANDING):  atorvastatin 40 milliGRAM(s) Oral at bedtime  carvedilol 12.5 milliGRAM(s) Oral every 12 hours  cefepime   IVPB 2000 milliGRAM(s) IV Intermittent every 24 hours  hydrALAZINE 10 milliGRAM(s) Oral three times a day  isosorbide   dinitrate Tablet (ISORDIL) 10 milliGRAM(s) Oral three times a day  levETIRAcetam 500 milliGRAM(s) Oral two times a day  levothyroxine 112 MICROGram(s) Oral daily  rifampin 300 milliGRAM(s) Oral three times a day  senna 2 Tablet(s) Oral at bedtime  sodium bicarbonate 650 milliGRAM(s) Oral two times a day  tamsulosin 0.4 milliGRAM(s) Oral at bedtime  torsemide 20 milliGRAM(s) Oral daily  vancomycin  IVPB        MEDICATIONS  (PRN):      Vital Signs Last 24 Hrs  T(C): 36.6 (16 Mar 2019 11:23), Max: 38.1 (16 Mar 2019 00:11)  T(F): 97.8 (16 Mar 2019 11:23), Max: 100.5 (16 Mar 2019 00:11)  HR: 73 (16 Mar 2019 11:23) (70 - 82)  BP: 124/75 (16 Mar 2019 11:23) (105/66 - 128/68)  BP(mean): --  RR: 18 (16 Mar 2019 11:23) (18 - 18)  SpO2: 97% (16 Mar 2019 11:23) (95% - 100%)  CAPILLARY BLOOD GLUCOSE        I&O's Summary    16 Mar 2019 07:01  -  16 Mar 2019 12:55  --------------------------------------------------------  IN: 0 mL / OUT: 320 mL / NET: -320 mL        PHYSICAL EXAM:  GENERAL: Disoriented. AAOx2. Covered in his own urine.   HEAD:  Atraumatic, Normocephalic  EYES: EOMI w/t mild nystagmus on horizontal gaze, PERRLA, conjunctiva and sclera clear  NECK: Supple, No JVD  CHEST/LUNG: Clear to auscultation bilaterally; No wheeze  HEART: Regular rate and rhythm; No murmurs, rubs, or gallops  ABDOMEN: Soft, Nontender, Nondistended; Bowel sounds present  EXTREMITIES:  2+ Peripheral Pulses, No clubbing, cyanosis, or edema. L 1st toe amputation.  PSYCH: AAOx3  NEUROLOGY: CN2-12 in tact. BL upper extremity motor 5/5. BL upper thigh motor 4/5 and lower leg 5/5. 2+ patellar reflexes, babinski neg. No sensation to light touch in either foot. Impaired sensation to light touch in BL lower legs. Regains sensation above the knee.   SKIN: Scabbed over lesion on the foot. A few hyperpigmented circular macules along the feet and and few on the hands.     LABS:                        8.4    8.80  )-----------( 74       ( 16 Mar 2019 11:17 )             26.8     03-16    135  |  100  |  26<H>  ----------------------------<  81  4.3   |  20<L>  |  2.41<H>    Ca    8.3<L>      16 Mar 2019 05:24  Phos  3.0     03-16  Mg     2.2     03-16    TPro  6.4  /  Alb  2.1<L>  /  TBili  0.5  /  DBili  x   /  AST  47<H>  /  ALT  22  /  AlkPhos  80  03-16    PT/INR - ( 16 Mar 2019 08:03 )   PT: 14.0 sec;   INR: 1.22 ratio           CARDIAC MARKERS ( 16 Mar 2019 05:24 )  x     / x     / 109 U/L / x     / x          Urinalysis Basic - ( 15 Mar 2019 10:19 )    Color: Dark Yellow / Appearance: Clear / S.013 / pH: x  Gluc: x / Ketone: Negative  / Bili: Negative / Urobili: Negative   Blood: x / Protein: 30 mg/dL / Nitrite: Negative   Leuk Esterase: Negative / RBC: 9 /hpf / WBC 2 /HPF   Sq Epi: x / Non Sq Epi: 1 /hpf / Bacteria: Negative        RADIOLOGY & ADDITIONAL TESTS:    < from: Transthoracic Echocardiogram (03.15.19 @ 13:25) >  EF (Visual Estimate): 20-25 %  Doppler Peak Velocity (m/sec): MV=2.2 AoV=2.7  ------------------------------------------------------------------------  Observations:  Mitral Valve: Bioprosthetic mitral valve replacement. There  is a long about 3 cm echogenic mass on the mitral valve  strongly suggestive of vegetation. Correlate clinically.  Mild mitral regurgitation.  Mean transmitral valve gradient  equals 8 mm Hg, which is elevated even in the setting of a  bioprosthetic mitral valve replacement.  Aortic Valve/Aorta: Bioprosthetic aortic valve. Peak  transaortic valve gradient equals 29 mm Hg, mean  transaortic valve gradient equals 12 mm Hg, which is  probably normal in the presence of a bioprosthetic aortic  valve. Minimal aortic regurgitation.  Peak left ventricular  outflow tract gradient equals 2 mm Hg, mean gradient is  equal to 1 mm Hg, LVOT velocity time integral equals 12 cm.  Aortic Root: 3.8 cm.  LVOT diameter: 1.9 cm.  Left Atrium: Severely dilated left atrium.  LA volume index  = 50 cc/m2.  Left Ventricle: Severe global left ventricular systolic  dysfunction with regional wall motion abnormaltiies.  Eccentric left ventricular hypertrophy (dilated left  ventricle with normal relative wall thickness). Increased  E/e'  is consistent with elevated left ventricular filling  pressure.  Right Heart: Right atrial enlargement. Normal right  ventricular size with decreased right ventricular systolic  function. Mild tricuspid regurgitation. Pulmonic valve not  well visualized. Minimal pulmonic regurgitation.  Pericardium/Pleura: Normal pericardium with no pericardial  effusion.  Hemodynamic: Estimated right atrial pressure is 8 mm Hg.  Estimated right ventricular systolic pressure equals 37 mm  Hg, assuming right atrial pressure equals 8 mm Hg,  consistent with borderline pulmonary hypertension.  ------------------------------------------------------------------------  Conclusions:  1. Bioprosthetic mitral valve replacement. There is a long  about 3 cm echogenic mass on the mitral valve strongly  suggestive of vegetation. Correlate clinically.  Mild  mitral regurgitation.  Mean transmitral valve gradient  equals 8 mm Hg, which is elevated even in the setting of a  bioprosthetic mitral valve replacement.  2. Bioprosthetic aortic valve. Peak transaortic valve  gradient equals 29 mm Hg, mean transaortic valve gradient  equals 12 mm Hg, which is probably normal in the presence  of a bioprosthetic aortic valve. However, he has  significant PVCs which may demonstrate elevated gradients  that are not representative of his true gradients. Suggest  reassessment of gradients across both the AV and MV once  optimized.  3. Severely dilated right and left atrium.  4. Eccentric left ventricular hypertrophy (dilated left  ventricle with normal relative wall thickness).  5. Severe global left ventricular systolic dysfunction with  regional wall motion abnormaltiies.  6. Increased E/e'  is consistent with elevated left  ventricular filling pressure.  7. Normal right ventricular size with decreased right  ventricular systolicfunction.  8. There is evidence of mitral valve endocarditis (known  from prior and currently on antibiotics).    < end of copied text >  < from: CT Head No Cont (03.15.19 @ 13:24) >  IMPRESSION:    Low-density in the right occipital lobe suspicious for a recent infarct.   Given history of endocarditis, the infarct may be embolic in nature.    < end of copied text >  < from: CT Chest w/ Oral Cont (03.15.19 @ 13:24) >  CHEST:     LUNGS AND LARGE AIRWAYS: Subsegmental atelectasis or pneumonia in the   left lower lobe. No pulmonary nodules.  PLEURA: No pleural effusion.  VESSELS: Right midline catheter with tip terminating in the superior   cavoatrial junction.   HEART: Heart size is normal. Aortic and mitral valve replacement. No   pericardial effusion.  MEDIASTINUM AND MAGEN: No lymphadenopathy.  CHEST WALL AND LOWER NECK: Within normal limits.    ABDOMEN AND PELVIS:    LIVER: Within normal limits.  BILE DUCTS: Normal caliber.   GALLBLADDER: Cholelithiasis.  SPLEEN: Within normal limits.  PANCREAS: Within normal limits.  ADRENALS: Within normal limits.  KIDNEYS/URETERS: Right nephrectomy. Left kidney is within normal limits.     BLADDER: Within normal limits.  REPRODUCTIVE ORGANS: Fiducial markers within the prostate gland.    BOWEL: No bowel obstruction. Normal appendix.    PERITONEUM: No ascites.  VESSELS:  Atherosclerotic change of the abdominal aorta and its branches.  RETROPERITONEUM: No lymphadenopathy.    ABDOMINAL WALL: Small fat-containing umbilical hernia.  BONES: Degenerative changes of the spine. Sternotomy.    IMPRESSION:   Subsegmental atelectasis versus pneumonia in the left lower lobe.     No acute intra-abdominal pathology.    < end of copied text >

## 2019-03-16 NOTE — PROGRESS NOTE ADULT - ASSESSMENT
62 y/o M w/t PMHx of bioprosthetic AV & MV c/b endocarditis (currently, on ceftriaxone and rifampin), hypothyroidism, BPH, history of seizures, and HTN, who presents to the hospital from rehab for suicidal ideations, admitted for JANEL with delusions also found to be febrile. 64 y/o M w/t PMHx of bioprosthetic AV & MV c/b endocarditis (on ceftriaxone and rifampin), hypothyroidism, BPH, history of seizures, and HTN, who presents to the hospital from rehab for suicidal ideations found to have AMS, JANEL, fever to 103. CTH revealed hypodensity in the occipital lobe concerning for septic emboli.

## 2019-03-16 NOTE — PROGRESS NOTE ADULT - NSICDXPROBLEM_GEN_ALL_CORE_FT
PROBLEM DIAGNOSES  Problem: Acute-on-chronic kidney injury  Assessment and Plan: - patient admitted for JANEL a per rehab reports, discharge Cr was 2.5 and currently Cr is >3    - likely pre-renal as patient has been stating he is vomiting when he eats. Also has been refusing to eat.    - continue IV fluids and check BMP in AM to assess for improvement     - avoid nephrotoxic agents    --check bladder scan   - holding torsemide    Problem: Endocarditis  Assessment and Plan: - patient discharged from [facility] with diagnosis of endocarditis with vegetation (unknown which valve)     - repeat blood cultures collected    - continue ceftriaxone and rifampin     - will need to obtain records from [facility] in AM    -  ID consult in AM     Problem: Fever  Assessment and Plan: --non toxic appearing, but now febrile to 103  --suspect related to endocarditis. Reports he has missed some antibiotic doses.   --complete fever work up including CXR and urine (hasn't peed yet and refusing straight cath)  --will continue current coverage for endocarditis, but does need ID consult in the morning.     Problem: Delusions  Assessment and Plan: - patient repeatedly stating there was "bacteria in his food" at [facility] and upset that no one is addressing it    - no psychiatric history per brother    - psych consult in AM    Problem: Anemia  Assessment and Plan: - patient with anemia, possibly secondary to CKD    - no signs of bleeding    - iron studies in AM    Problem: CHF (congestive heart failure)  Assessment and Plan: --chronic, unknown if systolic or diastolic  - patient on several medications to suggest CHF but brother did not know of any history    - attempt to get records from [facility]    - TTE ordered    - no signs of acute CHF on PE but will monitor carefully     Problem: Hypertension  Assessment and Plan: - on several BP meds at home    - continue home meds with hold parameters    Problem: History of heart valve replacement  Assessment and Plan: - brother states patient has had 2 heart valve replacements in the past but does not know which ones    - will need to contact [facility] to get more information     Problem: Seizures  Assessment and Plan: - continue keppra     Problem: Thrombocytopenia  Assessment and Plan: - patient with platelet count of 56, unknown baseline     - will hold anticoagulation, monitor for signs of bleeding    - attempt to get labs from [facility]     Problem: Need for prophylactic measure  Assessment and Plan: - DVT ppx held given thrombocytopenia    - Regular diet    - Dispo pending workup         May Reeves    PGY 2 Night Admit    Pager [phone] PROBLEM DIAGNOSES  Problem: Septic embolism  Assessment and Plan: Patient w/t new AMS per brother and CTH finding of hypodensity in occipital lobe concerning for septic emboli causing stroke in the setting of endocarditis  -Neuro recs: MR brain & MRA H&N  -Endocarditis management as below    Problem: Endocarditis  Assessment and Plan: Recent, known hospitalization for endocarditis now c/b septic emboli  -CTS recs  -Cards recs  -ID recs  -CARLY per CTS   -    Problem: Pancytopenia  Assessment and Plan: May be marrow suppression 2/2 sepsis. Will r/o viral eitiologies. Anemia possibly in setting of CKD. Possible platelets being used up from [facility] in setting of endocarditis throwing emboli and renal dysfunction.  -Check HIV, parvo, CMV, EBV    Problem: Anemia  Assessment and Plan: PROBLEM DIAGNOSES  Problem: Septic embolism  Assessment and Plan: Patient w/t new AMS per brother and CTH finding of hypodensity in occipital lobe concerning for septic emboli causing stroke in the setting of endocarditis  -Neuro recs: MR brain & MRA H&N  -Endocarditis management as below    Problem: Endocarditis  Assessment and Plan: Recent, known hospitalization for endocarditis now c/b septic emboli. TTE confirms 3cm vegetation on bioprosthetic MV.  -CTS recs  -Cards recs  -ID recs  -CARLY per CTS   -1g IV vanc Q24H (vanc trough prior to 3rd dose)  -2g cefepime IV QD  -    Problem: Pancytopenia  Assessment and Plan: May be marrow suppression 2/2 sepsis. Will r/o viral eitiologies. Anemia possibly in setting of CKD. Possible platelets being used up from DIC in setting of endocarditis throwing emboli and renal dysfunction.  -Check HIV, parvo, CMV, EBV  -Hold AC for now as Plts are very close to 50K  -Will obtain records from [facility]    Problem: Anemia  Assessment and Plan:     Problem: CHF (congestive heart failure)  Assessment and Plan: - chronic, unknown if systolic or diastolic   - patient on several medications to suggest CHF but brother did not know of any history    - attempt to get records from [facility]    - no signs of acute CHF on PE but will monitor carefully    Problem: History of heart valve replacement  Assessment and Plan: -4mg (120 pills) of warfarin last dispensed 12/18. INR subtherapeutic. Unclear how long patient was off AC given recent hospitalization or if it was held 2/2 thrombocytopenia.   -Will discuss w/t cardiologist Dr. Lachmann, although this may have been done by [facility]. Will attempt to get medical records.     Problem: Seizures  Assessment and Plan: - continue keppra     Problem: Need for prophylactic measure  Assessment and Plan: DVT PPx: Thrombocytopenia close to 50K, holding pharmcologic, SCDs for now  Diet: DASH (likely to need CC after A1C) PROBLEM DIAGNOSES  Problem: Endocarditis  Assessment and Plan: Recent, known hospitalization for endocarditis now c/b septic emboli. TTE confirms 3cm vegetation on bioprosthetic MV. BCx NGTD x2.   -CTS recs  -Cards recs  -ID recs  -CARLY per CTS   -1g vanc IV Q24H (vanc trough prior to 3rd dose)  -2g cefepime IV QD  -300mg rifampin PO TID    Problem: Septic embolism  Assessment and Plan: Patient w/t new AMS per brother and CTH finding of hypodensity in occipital lobe concerning for septic emboli causing stroke in the setting of endocarditis  -Neuro recs: MR brain & MRA H&N  -Endocarditis management as below    Problem: Pancytopenia  Assessment and Plan: May be marrow suppression 2/2 sepsis. Will r/o viral eitiologies. Anemia possibly in setting of CKD. Possible platelets being used up from DIC in setting of endocarditis throwing emboli and renal dysfunction.  -Check HIV, parvo, CMV, EBV  -Hold AC for now as Plts are very close to 50K  -Will obtain records from [facility]    Problem: Anemia  Assessment and Plan:     Problem: CHF (congestive heart failure)  Assessment and Plan: - chronic, unknown if systolic or diastolic   - patient on several medications to suggest CHF but brother did not know of any history    - attempt to get records from [facility]    - no signs of acute CHF on PE but will monitor carefully    Problem: History of heart valve replacement  Assessment and Plan: -4mg (120 pills) of warfarin last dispensed 12/18. INR subtherapeutic. Unclear how long patient was off AC given recent hospitalization or if it was held 2/2 thrombocytopenia.   -Will discuss w/t cardiologist Dr. Lachmann, although this may have been done by [facility]. Will attempt to get medical records.     Problem: Seizures  Assessment and Plan: - continue keppra     Problem: Acute-on-chronic kidney injury  Assessment and Plan: - patient admitted for JANEL a per rehab reports, discharge Cr was 2.5 and currently Cr is >3    - likely pre-renal as patient has been stating he is vomiting when he eats. Also has been refusing to eat. Low suspicion for DIC in setting of endocarditis c/b septic emboli.  - avoid nephrotoxic agents     - check bladder scan ()  - holding torsemide    Problem: Need for prophylactic measure  Assessment and Plan: DVT PPx: Thrombocytopenia close to 50K, holding pharmcologic, SCDs for now  Diet: DASH (likely to need CC after A1C)

## 2019-03-17 NOTE — OCCUPATIONAL THERAPY INITIAL EVALUATION ADULT - PLANNED THERAPY INTERVENTIONS, OT EVAL
bed mobility training/ADL retraining/balance training/cognitive, visual perceptual/transfer training

## 2019-03-17 NOTE — OCCUPATIONAL THERAPY INITIAL EVALUATION ADULT - MD ORDER
OT Evaluate and Treat  Increase Activity as Tolerated OT Evaluate and Treat  Increase Activity as Tolerated  Functional Evaluation 3/22

## 2019-03-17 NOTE — PROGRESS NOTE ADULT - ASSESSMENT
64 y/o M w/t PMHx of bioprosthetic AV & MV c/b endocarditis (on ceftriaxone and rifampin), hypothyroidism, BPH, history of seizures, and HTN, who presents to the hospital from rehab for suicidal ideations found to have AMS, JANEL, fever to 103. CTH revealed hypodensity in the occipital lobe concerning for septic emboli.     Problem: Endocarditis  Assessment and Plan: Recent, known hospitalization for endocarditis now c/b septic emboli. TTE confirms 3cm vegetation on bioprosthetic MV. BCx NGTD x2.   -CTS recs  -Cards recs  -ID recs  -CARLY per CTS   -1g vanc IV Q24H (vanc trough prior to 3rd dose)  -2g cefepime IV QD  -300mg rifampin PO TID    Problem: Septic embolism  Assessment and Plan: Patient w/t new AMS per brother and CTH finding of hypodensity in occipital lobe concerning for septic emboli causing stroke in the setting of endocarditis  -Neuro recs: MR brain & MRA H&N shows sigsn of septic ambolia along with area of hemorrhagic transformation  -Endocarditis management as below    Problem: Pancytopenia  Assessment and Plan: May be marrow suppression 2/2 sepsis. Will r/o viral eitiologies. Anemia possibly in setting of CKD. Possible platelets being used up from DIC in setting of endocarditis throwing emboli and renal dysfunction.  -Check HIV, parvo, CMV, EBV  -Hold AC for now as Plts are very close to 50K and pt has brain hemorrhage  -Will obtain records from [facility]    Problem: Anemia  Assessment and Plan:   trend CBC    Problem: CHF (congestive heart failure)  Assessment and Plan: - chronic, unknown if systolic or diastolic   - patient on several medications to suggest CHF but brother did not know of any history    - attempt to get records from [facility]    - no signs of acute CHF on PE but will monitor carefully    Problem: History of heart valve replacement  Assessment and Plan: -4mg (120 pills) of warfarin last dispensed 12/18. INR subtherapeutic. Unclear how long patient was off AC given recent hospitalization or if it was held 2/2 thrombocytopenia.   - no a/c in setting of hemorrhage  -Will discuss w/t cardiologist Dr. Lachmann, although this may have been done by [facility]. Will attempt to get medical records.     Problem: Seizures  Assessment and Plan: - continue keppra     Problem: Acute-on-chronic kidney injury  Assessment and Plan: - patient admitted for JANEL a per rehab reports, discharge Cr was 2.5 and currently Cr is >3    - likely pre-renal as patient has been stating he is vomiting when he eats. Also has been refusing to eat. Low suspicion for DIC in setting of endocarditis c/b septic emboli.  - avoid nephrotoxic agents     - checked bladder scan ()  - holding torsemide    Problem: Need for prophylactic measure  Assessment and Plan: DVT PPx: Thrombocytopenia close to 50K, holding pharmcologic, SCDs for now  Diet: DASH (likely to need CC after A1C). 64 y/o M w/t PMHx of bioprosthetic AV & MV c/b endocarditis (on ceftriaxone and rifampin), hypothyroidism, BPH, history of seizures, and HTN, who presents to the hospital from rehab for suicidal ideations found to have AMS, JANEL, fever to 103. CTH revealed hypodensity in the occipital lobe concerning for septic emboli.     Problem: Endocarditis  Assessment and Plan: Recent, known hospitalization for endocarditis now c/b septic emboli. TTE confirms 3cm vegetation on bioprosthetic MV. BCx NGTD x2.   -CTS recs  -Cards recs  -ID recs  -CARLY per CTS   -1g vanc IV Q24H (vanc trough prior to 3rd dose)  -2g cefepime IV QD  -300mg rifampin PO TID    Problem: Septic embolism  Assessment and Plan: Patient w/t new AMS per brother and CTH finding of hypodensity in occipital lobe concerning for septic emboli causing stroke in the setting of endocarditis  - MR brain & MRA H&N shows sigsn of septic ambolia along with area of hemorrhagic transformation  - f/u Neuro recs  -Endocarditis management as above    Problem: Pancytopenia  Assessment and Plan: May be marrow suppression 2/2 sepsis. Will r/o viral eitiologies. Anemia possibly in setting of CKD. Possible platelets being used up from DIC in setting of endocarditis throwing emboli and renal dysfunction.  -Check HIV, parvo, CMV, EBV  -Hold AC for now as Plts are very close to 50K and pt has brain hemorrhage  -Will obtain records from [facility]    Problem: Anemia  Assessment and Plan:   trend CBC    Problem: CHF (congestive heart failure)  Assessment and Plan: - chronic, unknown if systolic or diastolic   - patient on several medications to suggest CHF but brother did not know of any history    - attempt to get records from [facility]    - no signs of acute CHF on PE but will monitor carefully    Problem: History of heart valve replacement  Assessment and Plan: -4mg (120 pills) of warfarin last dispensed 12/18. INR subtherapeutic. Unclear how long patient was off AC given recent hospitalization or if it was held 2/2 thrombocytopenia.   - no a/c in setting of hemorrhage  -Will discuss w/t cardiologist Dr. Lachmann, although this may have been done by [facility]. Will attempt to get medical records.     Problem: Seizures  Assessment and Plan: - continue keppra     Problem: Acute-on-chronic kidney injury  Assessment and Plan: - patient admitted for JANEL a per rehab reports, discharge Cr was 2.5 and currently Cr is >3    - likely pre-renal as patient has been stating he is vomiting when he eats. Also has been refusing to eat. Low suspicion for DIC in setting of endocarditis c/b septic emboli.  - avoid nephrotoxic agents     - checked bladder scan ()  - holding torsemide    Problem: Need for prophylactic measure  Assessment and Plan: DVT PPx: Thrombocytopenia close to 50K, holding pharmcologic, SCDs for now  Diet: DASH (likely to need CC after A1C).

## 2019-03-17 NOTE — OCCUPATIONAL THERAPY INITIAL EVALUATION ADULT - ADDITIONAL COMMENTS
MR Head 3.17/19: Multiple focal areas of restricted diffusion suggesting new areas of hemorrhage within the right cerebellar hemisphere and within the right occipital lobe, with hemorrhagic transformation in the right occipital lobe. Tortuous extracranial vessels likely reflecting hypertension with stenoses of the distal middle and posterior cerebral artery branch vasculature.  CT Head/Chest 3/15: Low-density in the right occipital lobe suspicious for a recent infarct. Given history of endocarditis, the infarct may be embolic in nature.

## 2019-03-17 NOTE — OCCUPATIONAL THERAPY INITIAL EVALUATION ADULT - PERTINENT HX OF CURRENT PROBLEM, REHAB EVAL
63M presents to the hospital from rehab for suicidal ideations. Per ED & nursing home documentation, the pt has been non compliant with his medications. Today, he told someone from the staff that he wanted to kill himself so he was brought to the ED. When seen, the pt repeatedly stated that he was upset that at [place] whenever he ate food, he would become nauseous & vomit. He believed there was a "bacteria contaminating the food."  See below.

## 2019-03-17 NOTE — OCCUPATIONAL THERAPY INITIAL EVALUATION ADULT - ORIENTATION, REHAB EVAL
Pt able to state name, unable to state birthday, able to state place with choices after multiple attempts

## 2019-03-17 NOTE — OCCUPATIONAL THERAPY INITIAL EVALUATION ADULT - PRECAUTIONS/LIMITATIONS, REHAB EVAL
He was frustrated by this & told the staff that he wanted to kill himself to get out of the nursing home. He is AAOx3 otherwise. Spoke with brother Renee hunter states 1 month ago the pt's brother went to go see him at his home & found the pt unresponsive on the ground. EMS brought him to [facility], then transferred to [facility]. While at [facility], was found to have endocarditis. He was sent to rehab 3 days ago. The brother states he does not have any history of psychiatric issues but the pt told him he told the staff at rehab that he was suicidal because no one was addressing his concern regarding the "bacteria in the food."/fall precautions/cardiac precautions

## 2019-03-17 NOTE — PROGRESS NOTE ADULT - SUBJECTIVE AND OBJECTIVE BOX
Paresh Villalobos MD  Cell: [phone]  Pager: [phone]    S:  No overnight events.  Pt still agitated and confused.    VITAL SIGNS:  Vital Signs Last 24 Hrs  T(C): 37.1 (17 Mar 2019 13:30), Max: 37.7 (16 Mar 2019 19:56)  T(F): 98.7 (17 Mar 2019 13:30), Max: 99.8 (16 Mar 2019 19:56)  HR: 71 (17 Mar 2019 13:30) (68 - 74)  BP: 131/77 (17 Mar 2019 13:30) (106/67 - 132/81)  BP(mean): --  RR: 18 (17 Mar 2019 13:30) (18 - 18)  SpO2: 100% (17 Mar 2019 13:30) (96% - 100%)      PHYSICAL EXAM:   GENERAL: Disoriented. AAOx2. agitated  HEAD:  Atraumatic, Normocephalic  EYES: EOMI w/t mild nystagmus on horizontal gaze, PERRLA, conjunctiva and sclera clear  NECK: Supple, No JVD  CHEST/LUNG: Clear to auscultation bilaterally; No wheeze  HEART: Regular rate and rhythm; No murmurs, rubs, or gallops  ABDOMEN: Soft, Nontender, Nondistended; Bowel sounds present  EXTREMITIES:  2+ Peripheral Pulses, No clubbing, cyanosis, or edema. L 1st toe amputation.  PSYCH: AAOx2, agitated  NEUROLOGY: CN2-12 in tact. BL upper extremity motor 5/5. BL upper thigh motor 4/5 and lower leg 5/5. 2+ patellar reflexes, babinski neg. No sensation to light touch in either foot. Impaired sensation to light touch in BL lower legs. Regains sensation above the knee.   SKIN: Scabbed over lesion on the foot. A few hyperpigmented circular macules along the feet and and few on the hands.                           8.6    7.69  )-----------( 82       ( 17 Mar 2019 12:31 )             27.7     03-17    138  |  100  |  27<H>  ----------------------------<  107<H>  3.6   |  24  |  2.32<H>    Ca    8.7      17 Mar 2019 08:42  Phos  3.2     03-17  Mg     2.0     03-17    TPro  6.4  /  Alb  2.1<L>  /  TBili  0.5  /  DBili  x   /  AST  47<H>  /  ALT  22  /  AlkPhos  80  03-16        MEDICATIONS  (STANDING):  atorvastatin 40 milliGRAM(s) Oral at bedtime  carvedilol 12.5 milliGRAM(s) Oral every 12 hours  cefepime   IVPB 2000 milliGRAM(s) IV Intermittent every 24 hours  hydrALAZINE 10 milliGRAM(s) Oral three times a day  isosorbide   dinitrate Tablet (ISORDIL) 10 milliGRAM(s) Oral three times a day  levETIRAcetam 500 milliGRAM(s) Oral two times a day  levothyroxine 200 MICROGram(s) Oral daily  rifampin 300 milliGRAM(s) Oral three times a day  senna 2 Tablet(s) Oral at bedtime  sodium bicarbonate 650 milliGRAM(s) Oral two times a day  tamsulosin 0.4 milliGRAM(s) Oral at bedtime  torsemide 20 milliGRAM(s) Oral daily  vancomycin  IVPB 1000 milliGRAM(s) IV Intermittent every 24 hours  vancomycin  IVPB Paresh Villalobos MD  Cell: [phone]  Pager: [phone]    S:  No overnight events.  Pt still agitated and confused. unable to obtain ROS at this time due to encephelopathy     VITAL SIGNS:  Vital Signs Last 24 Hrs  T(C): 37.1 (17 Mar 2019 13:30), Max: 37.7 (16 Mar 2019 19:56)  T(F): 98.7 (17 Mar 2019 13:30), Max: 99.8 (16 Mar 2019 19:56)  HR: 71 (17 Mar 2019 13:30) (68 - 74)  BP: 131/77 (17 Mar 2019 13:30) (106/67 - 132/81)  BP(mean): --  RR: 18 (17 Mar 2019 13:30) (18 - 18)  SpO2: 100% (17 Mar 2019 13:30) (96% - 100%)      PHYSICAL EXAM:   GENERAL: Disoriented. AAOx2. agitated  HEAD:  Atraumatic, Normocephalic  EYES: EOMI w/t mild nystagmus on horizontal gaze, PERRLA, conjunctiva and sclera clear  NECK: Supple, No JVD  CHEST/LUNG: Clear to auscultation bilaterally; No wheeze  HEART: Regular rate and rhythm; No murmurs, rubs, or gallops  ABDOMEN: Soft, Nontender, Nondistended; Bowel sounds present  EXTREMITIES:  2+ Peripheral Pulses, No clubbing, cyanosis, or edema. L 1st toe amputation.  PSYCH: AAOx2, agitated  NEUROLOGY: CN2-12 in tact. BL upper extremity motor 5/5. BL upper thigh motor 4/5 and lower leg 5/5. 2+ patellar reflexes, babinski neg. No sensation to light touch in either foot. Impaired sensation to light touch in BL lower legs. Regains sensation above the knee.   SKIN: Scabbed over lesion on the foot. A few hyperpigmented circular macules along the feet and and few on the hands.                           8.6    7.69  )-----------( 82       ( 17 Mar 2019 12:31 )             27.7     03-17    138  |  100  |  27<H>  ----------------------------<  107<H>  3.6   |  24  |  2.32<H>    Ca    8.7      17 Mar 2019 08:42  Phos  3.2     03-17  Mg     2.0     03-17    TPro  6.4  /  Alb  2.1<L>  /  TBili  0.5  /  DBili  x   /  AST  47<H>  /  ALT  22  /  AlkPhos  80  03-16        MEDICATIONS  (STANDING):  atorvastatin 40 milliGRAM(s) Oral at bedtime  carvedilol 12.5 milliGRAM(s) Oral every 12 hours  cefepime   IVPB 2000 milliGRAM(s) IV Intermittent every 24 hours  hydrALAZINE 10 milliGRAM(s) Oral three times a day  isosorbide   dinitrate Tablet (ISORDIL) 10 milliGRAM(s) Oral three times a day  levETIRAcetam 500 milliGRAM(s) Oral two times a day  levothyroxine 200 MICROGram(s) Oral daily  rifampin 300 milliGRAM(s) Oral three times a day  senna 2 Tablet(s) Oral at bedtime  sodium bicarbonate 650 milliGRAM(s) Oral two times a day  tamsulosin 0.4 milliGRAM(s) Oral at bedtime  torsemide 20 milliGRAM(s) Oral daily  vancomycin  IVPB 1000 milliGRAM(s) IV Intermittent every 24 hours  vancomycin  IVPB

## 2019-03-18 NOTE — PROGRESS NOTE ADULT - ASSESSMENT
Patient is a 63 year old male with PMH of prosthetic valve replacement 2 years ago, seizures, HTN, nephrectomy. He was found to have endocarditis last month, was on antibiotics however noncompliant. During this admission, found to have acute R PICA and R PCA embolic infarcts likely secondary to endocarditis. MRA head and neck showing stenosis of the middle and posterior cerebral artery. TTE showing severe left ventricular dysfunction. Thick echodense layer of vegetation is seen coating the atrial surface of the mitral valve leaflets. There is an about 3cm linear, highly mobile, echodensity attached to the tip of one of the leaflets and prolapses into the left ventricle. No PFO noted.  Neuro exam limited due to cooperating, but noticed to be moving right side more than left.    Acute R PICA and R PCA embolic infarcts in the setting of endocarditis    Recommend:  Continue antibiotics as per ID  If cardiac surgery is planned, cerebral angio should be done 24 hours prior to rule out mycotic aneurysm 63 years old man with history of prosthetic mitral and aortic valves was recently diagnosed to have a mitral valve infective endocarditis on antibiotics was transferred from rehabilitation to [facility] for suicidal ideation. CT brain admission was concerning for right PCA distribution infarct. MRI brain performed subsequently showed acute infarction right PICA distribution and right PCA distribution with mild hemorrhagic transformation. CARLY showed thick echodense layer of vegetation coating the atrial surface of the mitral valve leaflets and about 3 cm linear, highly mobile echodensity.     Impression:  Cerebral embolism with cerebral infarction. Stroke involving vertebrobasilar system (right PICA and right PCA) - likely etiology being cardioembolism in the setting of infective endocarditis    Plan:  - Continue with IV antibiotics for secondary stroke prevention in this patient with culture negative infective endocarditis as per recommendations by infectious disease M.D.. CT chest, abdomen and pelvis with and without contrast to rule out any evidence of occult malignancy being the etiology of non-bacterial thrombotic endocarditis. Consider cardiothoracic surgical evaluation to determine his candidacy for CT surgery in this patient with persistent endocarditis and cerebral embolism. If patient is a surgical candidate, would recommend conventional angiogram 24 hours prior to planned surgery to rule out any evidence of mycotic aneurysm  - No indications for antiplatelet therapy for secondary stroke prevention standpoint in this patient with infective endocarditis and concerns for mycotic aneurysm  - Continue with aggressive vascular risk factors modification  - BP goal - gradual normotension  - Please call with questions

## 2019-03-18 NOTE — DISCHARGE NOTE NURSING/CASE MANAGEMENT/SOCIAL WORK - NSDCPEPTSTRK_GEN_ALL_CORE
Stroke education booklet/Stroke warning signs and symptoms/Need for follow up after discharge/Signs and symptoms of stroke/Call 911 for stroke/Risk factors for stroke/Prescribed medications/Stroke support groups for patients, families, and friends

## 2019-03-18 NOTE — PROGRESS NOTE ADULT - ASSESSMENT
63 year old male with recent admission for endocarditis discharged on ceftriaxone and rifampin via right arm picc, hypothyroidism, ?nephrectomy, BPH, history of seizures, and HTN who presents to the hospital from rehab for suicidal ideations.     At NH, he told someone from the staff that he wanted to kill himself so he was brought to the ED  s/p 2 heart valve replacements (Aortic and mitral valve), last replacement was 2 year ago     While at [facility], found to have endocarditis on ceftriaxone and rifampin - was reported as culture negative endocarditis    Right arm picc   Febrile overnight  TTE with prosthetic mitral valve with vegetation, has also prosthetic aortic valve  CT head concerning for septic emboli  WBC decreasing, anemia, thrombocytopenia  Renal function improving    Recommend:  -Continue with vancomycin and cefepime renally dosed  -Continue rifampin  -Will need to obtain chart from [facility]  -CARLY  -Agree with MRI head and MRA H/N  -Agree with viral workup - CMV/ EBV/ parvovirus/ HIV    For Culture negative endocarditis, would F/U:  -Quant TB Gold  -Bartonella serologies  -Q fever serologies  -Urine legionella  -Brucella serologies  -Chlamydia serologies 63 year old male with recent admission for endocarditis discharged on ceftriaxone and rifampin via right arm picc, hypothyroidism, ?nephrectomy, BPH, history of seizures, and HTN who presents to the hospital from rehab for suicidal ideations.     At NH, he told someone from the staff that he wanted to kill himself so he was brought to the ED  s/p 2 heart valve replacements (Aortic and mitral valve), last replacement was 2 year ago     While at [facility], found to have endocarditis on ceftriaxone and rifampin - was reported as culture negative endocarditis    Right arm picc   Now afebrile  TTE with prosthetic mitral valve with vegetation, has also prosthetic aortic valve  CT head/ MRI Head concerning for septic emboli, new areas of hemorrhage within the Right cerebellar hemisphere and right occipital lobe  Anemia, thrombocytopenia  Remains with JANEL  CMV serologies negative  EBV serologies consistent with prior infection  HIV negative    Recommend:  -Continue with vancomycin and cefepime renally dosed  -Continue rifampin  -Will need to obtain chart from [facility]  -CARLY  -Neurology input  -Cardiothoracic surgery input    For Culture negative endocarditis, would F/U:  -Quant TB Gold  -Bartonella serologies  -Q fever serologies  -Urine legionella  -Brucella serologies  -Chlamydia serologies

## 2019-03-18 NOTE — PROGRESS NOTE ADULT - ASSESSMENT
62 yo M with a PMH significant for prosthetic valve endocarditis(on ceftriaxone and rifampin), HFrEF?, hypothyroidism, BPH, history of seizures, and HTN who was sent to the hospital from rehab due to suicidal ideation. Cardiology consulted for endocarditis management given fever.    #Prosthetic valve endocarditis  Reported history of endocarditis with ceftriaxone and rifampin as outpatient. TTE with 3cm vegetation on mitral valve. MRI brain with evidence of likely embolization in right cerebellar hemisphere and right occipital lobes. CT surgery has been consulted.  - CARLY to further eval valve per surgery  - continue Abx pr ID    #HFrEF  Not volume overloaded on exam. TTE with EF 20-25%.  - continue outpatient medications: atorvastatin 40mg, coreg 12.5mg BID, Isordil 10mg TID, hydralazine 10mg TID, torsemide 20mg daily

## 2019-03-18 NOTE — PROGRESS NOTE ADULT - SUBJECTIVE AND OBJECTIVE BOX
Patient seen and examined at bedside.    Overnight Events: NAEO, sleeping but arousable, not answering many questions this am.    ROS: unable to assess.    Current Meds:  atorvastatin 40 milliGRAM(s) Oral at bedtime  carvedilol 12.5 milliGRAM(s) Oral every 12 hours  cefepime   IVPB 2000 milliGRAM(s) IV Intermittent every 24 hours  hydrALAZINE 10 milliGRAM(s) Oral three times a day  isosorbide   dinitrate Tablet (ISORDIL) 10 milliGRAM(s) Oral three times a day  levETIRAcetam 500 milliGRAM(s) Oral two times a day  levothyroxine 200 MICROGram(s) Oral daily  potassium chloride    Tablet ER 40 milliEquivalent(s) Oral once  rifampin 300 milliGRAM(s) Oral three times a day  senna 2 Tablet(s) Oral at bedtime  sodium bicarbonate 650 milliGRAM(s) Oral two times a day  tamsulosin 0.4 milliGRAM(s) Oral at bedtime  torsemide 20 milliGRAM(s) Oral daily  vancomycin  IVPB 1000 milliGRAM(s) IV Intermittent every 24 hours  vancomycin  IVPB          Vitals:  T(F): 98.5 (03-18), Max: 99.7 (03-17)  HR: 71 (03-18) (71 - 76)  BP: 125/80 (03-18) (118/69 - 138/80)  RR: 18 (03-18)  SpO2: 98% (03-18)  I&O's Summary    17 Mar 2019 07:01  -  18 Mar 2019 07:00  --------------------------------------------------------  IN: 0 mL / OUT: 0 mL / NET: 0 mL        Physical Exam:  GENERAL: No acute distress, well-developed  HEAD:  Atraumatic, Normocephalic  ENT: EOMI, PERRLA, conjunctiva and sclera clear, Neck supple, No JVD, moist mucosa  CHEST/LUNG: Clear to auscultation bilaterally anteriorly; No wheeze, equal breath sounds bilaterally   HEART: Regular rate and rhythm; No murmurs, rubs, or gallops  ABDOMEN: Soft, Nontender, Nondistended; Bowel sounds present  EXTREMITIES:  No clubbing, cyanosis, or edema  PSYCH: strange affect  NEUROLOGY: AAOx3, non-focal, cranial nerves intact  SKIN: Normal color, No rashes or lesions                        9.4    7.98  )-----------( 90       ( 18 Mar 2019 08:28 )             29.4     03-18    140  |  101  |  26<H>  ----------------------------<  110<H>  3.4<L>   |  25  |  2.50<H>    Ca    9.4      18 Mar 2019 06:45  Phos  3.7     03-18  Mg     2.0     03-18      PT/INR - ( 18 Mar 2019 08:29 )   PT: 14.8 sec;   INR: 1.29 ratio         PTT - ( 18 Mar 2019 08:29 )  PTT:44.4 sec  CARDIAC MARKERS ( 16 Mar 2019 05:25 )  464 ng/L / x     / x     / x     / x     / x      CARDIAC MARKERS ( 16 Mar 2019 05:24 )  x     / x     / x     / 109 U/L / x     / x      CARDIAC MARKERS ( 15 Mar 2019 21:13 )  488 ng/L / x     / x     / x     / x     / x      CARDIAC MARKERS ( 15 Mar 2019 17:25 )  502 ng/L / x     / x     / x     / x     / x          Echo:  3/15/19  EF 20-25%  Conclusions:  1. Bioprosthetic mitral valve replacement. There is a long  about 3 cm echogenic mass on the mitral valve strongly  suggestive of vegetation. Correlate clinically.  Mild  mitral regurgitation.  Mean transmitral valve gradient  equals 8 mm Hg, which is elevated even in the setting of a  bioprosthetic mitral valve replacement.  2. Bioprosthetic aortic valve. Peak transaortic valve  gradient equals 29 mm Hg, mean transaortic valve gradient  equals 12 mm Hg, which is probably normal in the presence  of a bioprosthetic aortic valve. However, he has  significant PVCs which may demonstrate elevated gradients  that are not representative of his true gradients. Suggest  reassessment of gradients across both the AV and MV once  optimized.  3. Severely dilated right and left atrium.  4. Eccentric left ventricular hypertrophy (dilated left  ventricle with normal relative wall thickness).  5. Severe global left ventricular systolic dysfunction with  regional wall motion abnormaltiies.  6. Increased E/e'  is consistent with elevated left  ventricular filling pressure.  7. Normal right ventricular size with decreased right  ventricular systolic function.  8. There is evidence of mitral valve endocarditis (known  from prior and currently on antibiotics).  *** No previous Echo exam.    Stress Testing:     Cath:here  is a long about 3 cm echogenic mass on the mitral valve  strongly suggestive of vegetation    Imaging:  MRI brain:  IMPRESSION:    Multiple focal areas of restricted diffusion suggesting new areas of   hemorrhage within the right cerebellar hemisphere and within the right   occipital lobe, with hemorrhagic transformation in the right occipital   lobe.    Tortuous extracranial vessels likely reflecting hypertension with   stenoses of the distal middle and posterior cerebral artery branch   vasculature. No  ICA origin hemodynamically significant stenosis by   NASCET criteria.    Interpretation of Telemetry: sinus 70s-80s, PVCs Referring: Cleopatra    Patient seen and examined at bedside.    Overnight Events: NAEO, sleeping but arousable, not answering many questions this am.    ROS: unable to assess 2/2 to mental status    Current Meds:  atorvastatin 40 milliGRAM(s) Oral at bedtime  carvedilol 12.5 milliGRAM(s) Oral every 12 hours  cefepime   IVPB 2000 milliGRAM(s) IV Intermittent every 24 hours  hydrALAZINE 10 milliGRAM(s) Oral three times a day  isosorbide   dinitrate Tablet (ISORDIL) 10 milliGRAM(s) Oral three times a day  levETIRAcetam 500 milliGRAM(s) Oral two times a day  levothyroxine 200 MICROGram(s) Oral daily  potassium chloride    Tablet ER 40 milliEquivalent(s) Oral once  rifampin 300 milliGRAM(s) Oral three times a day  senna 2 Tablet(s) Oral at bedtime  sodium bicarbonate 650 milliGRAM(s) Oral two times a day  tamsulosin 0.4 milliGRAM(s) Oral at bedtime  torsemide 20 milliGRAM(s) Oral daily  vancomycin  IVPB 1000 milliGRAM(s) IV Intermittent every 24 hours  vancomycin  IVPB          Vitals:  T(F): 98.5 (03-18), Max: 99.7 (03-17)  HR: 71 (03-18) (71 - 76)  BP: 125/80 (03-18) (118/69 - 138/80)  RR: 18 (03-18)  SpO2: 98% (03-18)  I&O's Summary    17 Mar 2019 07:01  -  18 Mar 2019 07:00  --------------------------------------------------------  IN: 0 mL / OUT: 0 mL / NET: 0 mL        Physical Exam:  GENERAL: No acute distress, well-developed  HEAD:  Atraumatic, Normocephalic  ENT: EOMI, PERRLA, conjunctiva and sclera clear, Neck supple, No JVD, moist mucosa  CHEST/LUNG: Clear to auscultation bilaterally anteriorly; No wheeze, equal breath sounds bilaterally   HEART: Regular rate and rhythm; No murmurs, rubs, or gallops  ABDOMEN: Soft, Nontender, Nondistended; Bowel sounds present  EXTREMITIES:  No clubbing, cyanosis, or edema  PSYCH: strange affect  NEUROLOGY: AAOx3, non-focal, cranial nerves intact  SKIN: Normal color, No rashes or lesions                        9.4    7.98  )-----------( 90       ( 18 Mar 2019 08:28 )             29.4     03-18    140  |  101  |  26<H>  ----------------------------<  110<H>  3.4<L>   |  25  |  2.50<H>    Ca    9.4      18 Mar 2019 06:45  Phos  3.7     03-18  Mg     2.0     03-18      PT/INR - ( 18 Mar 2019 08:29 )   PT: 14.8 sec;   INR: 1.29 ratio         PTT - ( 18 Mar 2019 08:29 )  PTT:44.4 sec  CARDIAC MARKERS ( 16 Mar 2019 05:25 )  464 ng/L / x     / x     / x     / x     / x      CARDIAC MARKERS ( 16 Mar 2019 05:24 )  x     / x     / x     / 109 U/L / x     / x      CARDIAC MARKERS ( 15 Mar 2019 21:13 )  488 ng/L / x     / x     / x     / x     / x      CARDIAC MARKERS ( 15 Mar 2019 17:25 )  502 ng/L / x     / x     / x     / x     / x          Echo:  3/15/19  EF 20-25%  Conclusions:  1. Bioprosthetic mitral valve replacement. There is a long  about 3 cm echogenic mass on the mitral valve strongly  suggestive of vegetation. Correlate clinically.  Mild  mitral regurgitation.  Mean transmitral valve gradient  equals 8 mm Hg, which is elevated even in the setting of a  bioprosthetic mitral valve replacement.  2. Bioprosthetic aortic valve. Peak transaortic valve  gradient equals 29 mm Hg, mean transaortic valve gradient  equals 12 mm Hg, which is probably normal in the presence  of a bioprosthetic aortic valve. However, he has  significant PVCs which may demonstrate elevated gradients  that are not representative of his true gradients. Suggest  reassessment of gradients across both the AV and MV once  optimized.  3. Severely dilated right and left atrium.  4. Eccentric left ventricular hypertrophy (dilated left  ventricle with normal relative wall thickness).  5. Severe global left ventricular systolic dysfunction with  regional wall motion abnormaltiies.  6. Increased E/e'  is consistent with elevated left  ventricular filling pressure.  7. Normal right ventricular size with decreased right  ventricular systolic function.  8. There is evidence of mitral valve endocarditis (known  from prior and currently on antibiotics).  *** No previous Echo exam.    Stress Testing:     Cath:here  is a long about 3 cm echogenic mass on the mitral valve  strongly suggestive of vegetation    Imaging:  MRI brain:  IMPRESSION:    Multiple focal areas of restricted diffusion suggesting new areas of   hemorrhage within the right cerebellar hemisphere and within the right   occipital lobe, with hemorrhagic transformation in the right occipital   lobe.    Tortuous extracranial vessels likely reflecting hypertension with   stenoses of the distal middle and posterior cerebral artery branch   vasculature. No  ICA origin hemodynamically significant stenosis by   NASCET criteria.    Interpretation of Telemetry: sinus 70s-80s, PVCs

## 2019-03-18 NOTE — PROGRESS NOTE ADULT - SUBJECTIVE AND OBJECTIVE BOX
CC: Patient is a 63y old  Male who presents with a chief complaint of JANEL (18 Mar 2019 09:46)    ID following for endocarditis    Interval History/ROS: Patient now with MRI findings of multiple areas of new hemorrhage within the right cerebellar hemisphere and right occipital lobe, with hemorrhagic transformation in the right occipital lobe. Patient was crying on examination after learning about his MRI findings. He has no new complaints. Denies fever, chills.    Rest of ROS negative.    Allergies  penicillin (Swelling)    ANTIMICROBIALS:  cefepime   IVPB 2000 every 24 hours  rifampin 300 three times a day  vancomycin  IVPB 1000 every 24 hours  vancomycin  IVPB      OTHER MEDS:  atorvastatin 40 milliGRAM(s) Oral at bedtime  carvedilol 12.5 milliGRAM(s) Oral every 12 hours  hydrALAZINE 10 milliGRAM(s) Oral three times a day  isosorbide   dinitrate Tablet (ISORDIL) 10 milliGRAM(s) Oral three times a day  levETIRAcetam 500 milliGRAM(s) Oral two times a day  levothyroxine 200 MICROGram(s) Oral daily  potassium chloride    Tablet ER 40 milliEquivalent(s) Oral once  senna 2 Tablet(s) Oral at bedtime  sodium bicarbonate 650 milliGRAM(s) Oral two times a day  tamsulosin 0.4 milliGRAM(s) Oral at bedtime  torsemide 20 milliGRAM(s) Oral daily    PE:    Vital Signs Last 24 Hrs  T(C): 37 (18 Mar 2019 09:30), Max: 37.6 (17 Mar 2019 21:02)  T(F): 98.6 (18 Mar 2019 09:30), Max: 99.7 (17 Mar 2019 21:02)  HR: 66 (18 Mar 2019 09:30) (66 - 76)  BP: 127/73 (18 Mar 2019 09:30) (118/69 - 138/80)  BP(mean): --  RR: 18 (18 Mar 2019 09:30) (17 - 18)  SpO2: 96% (18 Mar 2019 09:30) (95% - 100%)    Gen: AOx3, NAD  CV: S1+S2 normal, no murmurs  Resp: Clear bilat, no resp distress  Abd: Soft, nontender, +BS  Ext: No LE edema, no wounds  : No Griffith  IV/Skin: No thrombophlebitis, right arm PICC intact  Neuro: no focal deficits    LABS:                          9.4    7.98  )-----------( 90       ( 18 Mar 2019 08:28 )             29.4       03-18    140  |  101  |  26<H>  ----------------------------<  110<H>  3.4<L>   |  25  |  2.50<H>    Ca    9.4      18 Mar 2019 06:45  Phos  3.7     03-18  Mg     2.0     03-18    MICROBIOLOGY:  Vancomycin Level, Trough: 21.0 ug/mL (03-18-19 @ 07:42)  v  .Blood Blood-Peripheral  03-15-19   No growth to date.  --  --    CMV IgG Antibody: 0.27 U/mL (03-17-19 @ 12:53)    RADIOLOGY:    < from: MR Head No Cont (03.17.19 @ 13:14) >  IMPRESSION:    Multiple focal areas of restricted diffusion suggesting new areas of   hemorrhage within the right cerebellar hemisphere and within the right   occipital lobe, with hemorrhagic transformation in the right occipital   lobe.    Tortuous extracranial vessels likely reflecting hypertension with   stenoses of the distal middle and posterior cerebral artery branch   vasculature. No  ICA origin hemodynamically significant stenosis by   NASCET criteria.      < end of copied text >

## 2019-03-18 NOTE — DISCHARGE NOTE NURSING/CASE MANAGEMENT/SOCIAL WORK - NSDCPEPT PROEDHF_GEN_ALL_CORE
Low salt diet/Call primary care provider for follow up after discharge/Monitor weight daily/Activities as tolerated/Report signs and symptoms to primary care provider

## 2019-03-18 NOTE — DISCHARGE NOTE NURSING/CASE MANAGEMENT/SOCIAL WORK - NSDCPEWEB_GEN_ALL_CORE
[facility] for Tobacco Control website --- [email or web address]

## 2019-03-18 NOTE — PROGRESS NOTE ADULT - SUBJECTIVE AND OBJECTIVE BOX
Patient is a 63y old  Male who presents with a chief complaint of JANEL (18 Mar 2019 14:30)      SUBJECTIVE / OVERNIGHT EVENTS:  Patient has no complaints. He is struggling to express his thoughts, however.  He had been declining medications prior to this note.    Review of Systems:  Unable to obtain ROS 2/2 mental status.    MEDICATIONS  (STANDING):  atorvastatin 40 milliGRAM(s) Oral at bedtime  carvedilol 12.5 milliGRAM(s) Oral every 12 hours  cefepime   IVPB 2000 milliGRAM(s) IV Intermittent every 24 hours  hydrALAZINE 10 milliGRAM(s) Oral three times a day  isosorbide   dinitrate Tablet (ISORDIL) 10 milliGRAM(s) Oral three times a day  levETIRAcetam 500 milliGRAM(s) Oral two times a day  levothyroxine 200 MICROGram(s) Oral daily  potassium chloride    Tablet ER 40 milliEquivalent(s) Oral once  rifampin 300 milliGRAM(s) Oral three times a day  senna 2 Tablet(s) Oral at bedtime  sodium bicarbonate 650 milliGRAM(s) Oral two times a day  tamsulosin 0.4 milliGRAM(s) Oral at bedtime  torsemide 20 milliGRAM(s) Oral daily    MEDICATIONS  (PRN):      Vital Signs Last 24 Hrs  T(C): 36.3 (18 Mar 2019 12:49), Max: 37.6 (17 Mar 2019 21:02)  T(F): 97.3 (18 Mar 2019 12:49), Max: 99.7 (17 Mar 2019 21:02)  HR: 73 (18 Mar 2019 18:14) (66 - 73)  BP: 134/83 (18 Mar 2019 18:14) (118/69 - 134/83)  BP(mean): --  RR: 18 (18 Mar 2019 12:49) (18 - 18)  SpO2: 98% (18 Mar 2019 12:49) (96% - 99%)  CAPILLARY BLOOD GLUCOSE        I&O's Summary    17 Mar 2019 07:01  -  18 Mar 2019 07:00  --------------------------------------------------------  IN: 0 mL / OUT: 0 mL / NET: 0 mL    18 Mar 2019 07:01  -  18 Mar 2019 18:50  --------------------------------------------------------  IN: 0 mL / OUT: 0 mL / NET: 0 mL        PHYSICAL EXAM:  GENERAL: confused but NAD, naked  HEAD: Atraumatic  EYES: EOMI, PERRL, conjunctiva and sclera clear  NECK: supple, no JVD  CHEST/LUNG: clear to auscultation bilaterally. No wheezes, rales, or rhonchi  HEART: regular rate and rhythm. No murmurs, rubs, or gallops  ABDOMEN: soft, nontender, nondistended. Bowel sounds present  EXTREMITIES: 2+ peripheral pulses, no clubbing, cyanosis, or edema  NEUROLOGY: A&Ox1  SKIN: no rashes or lesions      LABS:                        9.4    7.98  )-----------( 90       ( 18 Mar 2019 08:28 )             29.4     03-18    140  |  101  |  26<H>  ----------------------------<  110<H>  3.4<L>   |  25  |  2.50<H>    Ca    9.4      18 Mar 2019 06:45  Phos  3.7     03-18  Mg     2.0     03-18      PT/INR - ( 18 Mar 2019 08:29 )   PT: 14.8 sec;   INR: 1.29 ratio         PTT - ( 18 Mar 2019 08:29 )  PTT:44.4 sec          RADIOLOGY & ADDITIONAL TESTS:    Imaging Personally Reviewed: none    Consultant(s) Notes Reviewed: neurology, Cardiology, ID    Care Discussed with Consults/Other Providers: none

## 2019-03-18 NOTE — DISCHARGE NOTE NURSING/CASE MANAGEMENT/SOCIAL WORK - NSDCPEEMAIL_GEN_ALL_CORE
[facility] for Tobacco Control email [email or web address]

## 2019-03-18 NOTE — PROGRESS NOTE ADULT - NSICDXPROBLEM_GEN_ALL_CORE_FT
PROBLEM DIAGNOSES  Problem: Endocarditis  Assessment and Plan: Recent, known hospitalization for endocarditis now c/b septic emboli. TTE confirms 3cm vegetation on bioprosthetic MV and confirmed on CARLY with mobile echodensity. BCx NGTD x2 abnd bcx neative at [facility]  - CT surgery consulted after CARLY, awaiting recs   -Cards and neuro recs    -ID recs    -1g vanc IV Q24H (vanc trough prior to 3rd dose)    -2g cefepime IV QD and Rifampin  - Will likely require cerebral angiogram prior to any surgery   -300mg rifampin PO TID    Problem: Pancytopenia  Assessment and Plan: May be marrow suppression 2/2 sepsis. Will r/o viral eitiologies. Anemia possibly in setting of CKD. Possible platelets being used up from [facility] in setting of endocarditis throwing emboli and renal dysfunction but labs are improving and appear baseline from [facility]  - HIV, CMV, and EBV negative. F/u parvo.   -Hold AC for now due to thrombocytopenia and hemorrhagic infarcts    Problem: Septic embolism  Assessment and Plan: Patient w/t new AMS per brother and C[facility] finding of hypodensity in occipital lobe concerning for septic emboli causing stroke in the setting of endocarditis   - MRI shows new hemorrhagic infarcts in multiple areas contibruting to AMS. Patient does not have capacity to refuse treatment   -Endocarditis management as above  - F/u Q fever, bartonella, chlamydia    Problem: CHF (congestive heart failure)  Assessment and Plan: - chronic, unknown if systolic or diastolic     - patient on several medications to suggest CHF but brother did not know of any history      - attempt to get records from [facility]      - no signs of acute CHF on PE but will monitor carefully    Problem: Hypertension  Assessment and Plan: - on several BP meds at home        - continue home meds with hold parameters    Problem: History of heart valve replacement  Assessment and Plan: -4mg (120 pills) of warfarin last dispensed 12/18. INR subtherapeutic. Per [facility] records, patient was on Coumadin prior to hospitalization but for unclear reasons was stopped in hospital. May have been held 2/2 thrombocytopenia.     -Will attempt to discuss w/t cardiologist Dr. Lachmann    Problem: Seizures  Assessment and Plan: Not on meds, will continue to monitor    Problem: Thrombocytopenia  Assessment and Plan:     Problem: Need for prophylactic measure  Assessment and Plan: DVT PPx: Thrombocytopenia close to 50K, holding pharmcologic, SCDs for now    Diet: DASH (likely to need CC after A1C)  Dispo: pending workup and possible surgery

## 2019-03-18 NOTE — PROGRESS NOTE ADULT - SUBJECTIVE AND OBJECTIVE BOX
Subjective: Interval History - No events overnight. Patient appearing withdrawn.    Objective:   Vital Signs Last 24 Hrs  T(C): 36.3 (18 Mar 2019 12:49), Max: 37.6 (17 Mar 2019 21:02)  T(F): 97.3 (18 Mar 2019 12:49), Max: 99.7 (17 Mar 2019 21:02)  HR: 68 (18 Mar 2019 12:49) (66 - 76)  BP: 124/74 (18 Mar 2019 12:49) (118/69 - 138/80)  RR: 18 (18 Mar 2019 12:49) (17 - 18)  SpO2: 98% (18 Mar 2019 12:49) (95% - 99%)    General Exam:   General appearance: No acute distress. Refusing to cooperate with exam.          Neurological Exam:  Mental Status: Orientated to self. Does not want to answer questions and does not want to be touched. Speaking fluently with no dysarthria.     Cranial Nerves: EOMI. No facial asymmetry.    Motor:   Moving upper extremities, right more than left    Other:  03-18    140  |  101  |  26<H>  ----------------------------<  110<H>  3.4<L>   |  25  |  2.50<H>    Ca    9.4      18 Mar 2019 06:45  Phos  3.7     03-18  Mg     2.0     03-18               9.4    7.98  )-----------( 90       ( 18 Mar 2019 08:28 )             29.4     MEDICATIONS  (STANDING):  atorvastatin 40 milliGRAM(s) Oral at bedtime  carvedilol 12.5 milliGRAM(s) Oral every 12 hours  cefepime   IVPB 2000 milliGRAM(s) IV Intermittent every 24 hours  hydrALAZINE 10 milliGRAM(s) Oral three times a day  isosorbide   dinitrate Tablet (ISORDIL) 10 milliGRAM(s) Oral three times a day  levETIRAcetam 500 milliGRAM(s) Oral two times a day  levothyroxine 200 MICROGram(s) Oral daily  potassium chloride    Tablet ER 40 milliEquivalent(s) Oral once  rifampin 300 milliGRAM(s) Oral three times a day  senna 2 Tablet(s) Oral at bedtime  sodium bicarbonate 650 milliGRAM(s) Oral two times a day  tamsulosin 0.4 milliGRAM(s) Oral at bedtime  torsemide 20 milliGRAM(s) Oral daily  vancomycin  IVPB 1000 milliGRAM(s) IV Intermittent every 24 hours  vancomycin  IVPB Subjective: Interval History - No events overnight. Patient appearing withdrawn.    ROS: patient refused to answer any questions.     Objective:   Vital Signs Last 24 Hrs  T(C): 36.3 (18 Mar 2019 12:49), Max: 37.6 (17 Mar 2019 21:02)  T(F): 97.3 (18 Mar 2019 12:49), Max: 99.7 (17 Mar 2019 21:02)  HR: 68 (18 Mar 2019 12:49) (66 - 76)  BP: 124/74 (18 Mar 2019 12:49) (118/69 - 138/80)  RR: 18 (18 Mar 2019 12:49) (17 - 18)  SpO2: 98% (18 Mar 2019 12:49) (95% - 99%)    General Exam:   General appearance: No acute distress. Refusing to cooperate with exam.          Neurological Exam:  Mental Status: Orientated to self. Does not want to answer questions and does not want to be touched. Speaking fluently with no dysarthria.     Cranial Nerves: EOMI. No facial asymmetry.    Motor:   Moving upper extremities against gravity without any noticeable focal motor weakness, refused to move lower extremities, did not participate with further examination    Sensory and cerebellar: refused examination     Other:  03-18    140  |  101  |  26<H>  ----------------------------<  110<H>  3.4<L>   |  25  |  2.50<H>    Ca    9.4      18 Mar 2019 06:45  Phos  3.7     03-18  Mg     2.0     03-18               9.4    7.98  )-----------( 90       ( 18 Mar 2019 08:28 )             29.4     MEDICATIONS  (STANDING):  atorvastatin 40 milliGRAM(s) Oral at bedtime  carvedilol 12.5 milliGRAM(s) Oral every 12 hours  cefepime   IVPB 2000 milliGRAM(s) IV Intermittent every 24 hours  hydrALAZINE 10 milliGRAM(s) Oral three times a day  isosorbide   dinitrate Tablet (ISORDIL) 10 milliGRAM(s) Oral three times a day  levETIRAcetam 500 milliGRAM(s) Oral two times a day  levothyroxine 200 MICROGram(s) Oral daily  potassium chloride    Tablet ER 40 milliEquivalent(s) Oral once  rifampin 300 milliGRAM(s) Oral three times a day  senna 2 Tablet(s) Oral at bedtime  sodium bicarbonate 650 milliGRAM(s) Oral two times a day  tamsulosin 0.4 milliGRAM(s) Oral at bedtime  torsemide 20 milliGRAM(s) Oral daily  vancomycin  IVPB 1000 milliGRAM(s) IV Intermittent every 24 hours  vancomycin  IVPB

## 2019-03-18 NOTE — PROGRESS NOTE ADULT - ASSESSMENT
64 y/o M w/t PMHx of bioprosthetic AV & MV c/b endocarditis (on ceftriaxone and rifampin), hypothyroidism, BPH, history of seizures, and HTN, who presents to the hospital from rehab for suicidal ideations found to have AMS, JANEL, fever, all 2/2 endocarditis with suspected hemorrhagic septic emboli.    Problem: Endocarditis  Assessment and Plan: Recent, known hospitalization for endocarditis now c/b septic emboli. TTE confirms 3cm vegetation on bioprosthetic MV. BCx NGTD x2.   -CTS recs  -Cards recs  -ID recs  -CARLY per CTS   -1g vanc IV Q24H (vanc trough prior to 3rd dose)  -2g cefepime IV QD  -300mg rifampin PO TID    Problem: Septic embolism  Assessment and Plan: Patient w/t new AMS per brother and CTH finding of hypodensity in occipital lobe concerning for septic emboli causing stroke in the setting of endocarditis  - MR brain & MRA H&N shows sigsn of septic ambolia along with area of hemorrhagic transformation  - f/u Neuro recs  -Endocarditis management as above    Problem: Pancytopenia  Assessment and Plan: May be marrow suppression 2/2 sepsis. Will r/o viral eitiologies. Anemia possibly in setting of CKD. Possible platelets being used up from DIC in setting of endocarditis throwing emboli and renal dysfunction.  -Check HIV, parvo, CMV, EBV  -Hold AC for now as Plts are very close to 50K and pt has brain hemorrhage  -Will obtain records from [employer] [employer]    Problem: Anemia  Assessment and Plan:   trend CBC    Problem: CHF (congestive heart failure)  Assessment and Plan: - chronic, unknown if systolic or diastolic   - patient on several medications to suggest CHF but brother did not know of any history    - attempt to get records from [facility]    - no signs of acute CHF on PE but will monitor carefully    Problem: History of heart valve replacement  Assessment and Plan: -4mg (120 pills) of warfarin last dispensed 12/18. INR subtherapeutic. Unclear how long patient was off AC given recent hospitalization or if it was held 2/2 thrombocytopenia.   - no a/c in setting of hemorrhage  -Will discuss w/t cardiologist Dr. Lachmann, although this may have been done by [facility] [employer]. Will attempt to get medical records.     Problem: Seizures  Assessment and Plan: - continue keppra     Problem: Acute-on-chronic kidney injury  Assessment and Plan: - patient admitted for JANEL a per rehab reports, discharge Cr was 2.5 and currently Cr is >3    - likely pre-renal as patient has been stating he is vomiting when he eats. Also has been refusing to eat. Low suspicion for DIC in setting of endocarditis c/b septic emboli.  - avoid nephrotoxic agents     - checked bladder scan ()  - holding torsemide    Problem: Need for prophylactic measure  Assessment and Plan: DVT PPx: Thrombocytopenia close to 50K, holding pharmcologic, SCDs for now  Diet: DASH (likely to need CC after A1C).

## 2019-03-18 NOTE — DISCHARGE NOTE NURSING/CASE MANAGEMENT/SOCIAL WORK - NSDCDPATPORTLINK_GEN_ALL_CORE
You can access the [facility] Patient Portal, offered by [facility], by registering with the following website: [email or web address]

## 2019-03-19 NOTE — PROGRESS NOTE ADULT - ASSESSMENT
64 yo M with a PMH significant for prosthetic valve endocarditis(on ceftriaxone and rifampin), HFrEF?, hypothyroidism, BPH, history of seizures, and HTN who was sent to the hospital from rehab due to suicidal ideation. Cardiology consulted for endocarditis management given fever.    #Prosthetic valve endocarditis  Reported history of endocarditis with ceftriaxone and rifampin as outpatient. TTE with 3cm vegetation on mitral valve. MRI brain with evidence of likely embolization in right cerebellar hemisphere and right occipital lobes. CARLY with "Thick echodense layer of vegetation is seen coating the atrial surface of the mitral valve leaflets. There is an about 3cm linear, highly mobile, echodensity attached to the tip of one of the leaflets and prolapses into the left ventricle."  - CT surgery has been consulted, f/u recs  - continue Abx pr ID    #HFrEF  Not volume overloaded on exam. TTE with EF 20-25%.  - continue outpatient medications: atorvastatin 40mg, coreg 12.5mg BID, Isordil 10mg TID, hydralazine 10mg TID, torsemide 20mg daily

## 2019-03-19 NOTE — PROGRESS NOTE ADULT - SUBJECTIVE AND OBJECTIVE BOX
Referring: Leodantigir    Overnight Events: NAEO, refused medications 3/18. Not answering questions.    ROS: unable to assess 2/2 to mental status    Current Meds:  atorvastatin 40 milliGRAM(s) Oral at bedtime  carvedilol 12.5 milliGRAM(s) Oral every 12 hours  cefepime   IVPB 2000 milliGRAM(s) IV Intermittent every 24 hours  hydrALAZINE 10 milliGRAM(s) Oral three times a day  isosorbide   dinitrate Tablet (ISORDIL) 10 milliGRAM(s) Oral three times a day  levETIRAcetam 500 milliGRAM(s) Oral two times a day  levothyroxine 200 MICROGram(s) Oral daily  potassium chloride    Tablet ER 40 milliEquivalent(s) Oral once  rifampin 300 milliGRAM(s) Oral three times a day  senna 2 Tablet(s) Oral at bedtime  sodium bicarbonate 650 milliGRAM(s) Oral two times a day  tamsulosin 0.4 milliGRAM(s) Oral at bedtime  torsemide 20 milliGRAM(s) Oral daily  vancomycin  IVPB 750 milliGRAM(s) IV Intermittent every 24 hours      Vitals:  T(F): 97.6 (03-19), Max: 98.6 (03-18)  HR: 67 (03-19) (66 - 73)  BP: 123/81 (03-19) (117/74 - 134/83)  RR: 18 (03-19)  SpO2: 98% (03-19)  I&O's Summary    18 Mar 2019 07:01  -  19 Mar 2019 07:00  --------------------------------------------------------  IN: 0 mL / OUT: 0 mL / NET: 0 mL      Physical Exam:  GENERAL: No acute distress, nude  HEAD:  Atraumatic, Normocephalic  ENT:Neck supple, No JVD  CHEST/LUNG: Clear to auscultation bilaterally anteriorly; No wheeze, equal breath sounds bilaterally   HEART: Regular rate and rhythm; No murmurs, rubs, or gallops  ABDOMEN: Soft, Nontender, Nondistended; Bowel sounds present  EXTREMITIES:  No clubbing, cyanosis, or edema  PSYCH: strange affect  NEUROLOGY: AAOx3, non-focal, cranial nerves intact  SKIN: Normal color, No rashes or lesions                                   9.4    7.98  )-----------( 90       ( 18 Mar 2019 08:28 )             29.4     03-18    140  |  101  |  26<H>  ----------------------------<  110<H>  3.4<L>   |  25  |  2.50<H>    Ca    9.4      18 Mar 2019 06:45  Phos  3.7     03-18  Mg     2.0     03-18      PT/INR - ( 18 Mar 2019 08:29 )   PT: 14.8 sec;   INR: 1.29 ratio         PTT - ( 18 Mar 2019 08:29 )  PTT:44.4 sec  CARDIAC MARKERS ( 16 Mar 2019 05:25 )  464 ng/L / x     / x     / x     / x     / x      CARDIAC MARKERS ( 16 Mar 2019 05:24 )  x     / x     / x     / 109 U/L / x     / x      CARDIAC MARKERS ( 15 Mar 2019 21:13 )  488 ng/L / x     / x     / x     / x     / x      CARDIAC MARKERS ( 15 Mar 2019 17:25 )  502 ng/L / x     / x     / x     / x     / x            Echo:  3/18/19 CARLY  Conclusions:  1. Bioprosthetic mitral valve replacement. The valve  appears to be well seated. No sign of dehiscence. Thick  echodense layer of vegetation is seen coating the atrial  surface of the mitral valve leaflets. There is an about 3cm  linear, highly mobile, echodensity attached to the tip of  one of the leaflets and prolapses into the left ventricle.  Mild mitral regurgitation. No paravalvular regurgitation.  Mean transmitral valve gradient equals 4 mm Hg, which is  probably normal in the setting of a bioprosthetic mitral  valve replacement.  2. Bioprosthetic aortic valve in good position. No evidence  for endocarditis. Peak transaortic valve gradient equals 25  mm Hg, mean transaortic valve gradient equals 16 mm Hg,  which is probably normal in the presence of a bioprosthetic  aortic valve. Mild aortic regurgitation.  3. Severe global left ventricular systolic dysfunction.  4. Right ventricular enlargement with decreased right  ventricular systolic function.      3/15/19  EF 20-25%  Conclusions:  1. Bioprosthetic mitral valve replacement. There is a long  about 3 cm echogenic mass on the mitral valve strongly  suggestive of vegetation. Correlate clinically.  Mild  mitral regurgitation.  Mean transmitral valve gradient  equals 8 mm Hg, which is elevated even in the setting of a  bioprosthetic mitral valve replacement.  2. Bioprosthetic aortic valve. Peak transaortic valve  gradient equals 29 mm Hg, mean transaortic valve gradient  equals 12 mm Hg, which is probably normal in the presence  of a bioprosthetic aortic valve. However, he has  significant PVCs which may demonstrate elevated gradients  that are not representative of his true gradients. Suggest  reassessment of gradients across both the AV and MV once  optimized.  3. Severely dilated right and left atrium.  4. Eccentric left ventricular hypertrophy (dilated left  ventricle with normal relative wall thickness).  5. Severe global left ventricular systolic dysfunction with  regional wall motion abnormaltiies.  6. Increased E/e'  is consistent with elevated left  ventricular filling pressure.  7. Normal right ventricular size with decreased right  ventricular systolic function.  8. There is evidence of mitral valve endocarditis (known  from prior and currently on antibiotics).  *** No previous Echo exam.    Stress Testing:     Imaging:  MRI brain:  IMPRESSION:    Multiple focal areas of restricted diffusion suggesting new areas of   hemorrhage within the right cerebellar hemisphere and within the right   occipital lobe, with hemorrhagic transformation in the right occipital   lobe.    Tortuous extracranial vessels likely reflecting hypertension with   stenoses of the distal middle and posterior cerebral artery branch   vasculature. No  ICA origin hemodynamically significant stenosis by   NASCET criteria.    Interpretation of Telemetry: sinus 70s-80s, PVCs

## 2019-03-19 NOTE — PROGRESS NOTE ADULT - ASSESSMENT
64 y/o M w/t PMHx of bioprosthetic AV & MV c/b endocarditis (on ceftriaxone and rifampin), hypothyroidism, BPH, history of seizures, and HTN, who presents to the hospital from rehab for suicidal ideations found to have AMS, JANEL, fever, all 2/2 endocarditis with suspected hemorrhagic septic emboli.    Problem: Endocarditis  Assessment and Plan: Recent, known hospitalization for endocarditis now c/b septic emboli. TTE confirms 3cm vegetation on bioprosthetic MV. BCx NGTD x2.   -CTS recs  -Cards recs  -ID recs  -CARLY per CTS   -1g vanc IV Q24H (vanc trough prior to 3rd dose)  -2g cefepime IV QD  -300mg rifampin PO TID    Problem: Septic embolism  Assessment and Plan: Patient w/t new AMS per brother and CTH finding of hypodensity in occipital lobe concerning for septic emboli causing stroke in the setting of endocarditis  - MR brain & MRA H&N shows sigsn of septic ambolia along with area of hemorrhagic transformation  - f/u Neuro recs  -Endocarditis management as above    Problem: Pancytopenia  Assessment and Plan: May be marrow suppression 2/2 sepsis. Will r/o viral eitiologies. Anemia possibly in setting of CKD. Possible platelets being used up from DIC in setting of endocarditis throwing emboli and renal dysfunction.  -Check HIV, parvo, CMV, EBV  -Hold AC for now as Plts are very close to 50K and pt has brain hemorrhage  -Will obtain records from [facility]    Problem: Anemia  Assessment and Plan:   trend CBC    Problem: CHF (congestive heart failure)  Assessment and Plan: - chronic, unknown if systolic or diastolic   - patient on several medications to suggest CHF but brother did not know of any history    - attempt to get records from [facility]    - no signs of acute CHF on PE but will monitor carefully    Problem: History of heart valve replacement  Assessment and Plan: -4mg (120 pills) of warfarin last dispensed 12/18. INR subtherapeutic. Unclear how long patient was off AC given recent hospitalization or if it was held 2/2 thrombocytopenia.   - no a/c in setting of hemorrhage  -Will discuss w/t cardiologist Dr. Lachmann, although this may have been done by [facility]. Will attempt to get medical records.     Problem: Seizures  Assessment and Plan: - continue keppra     Problem: Acute-on-chronic kidney injury  Assessment and Plan: - patient admitted for JANEL a per rehab reports, discharge Cr was 2.5 and currently Cr is >3    - likely pre-renal as patient has been stating he is vomiting when he eats. Also has been refusing to eat. Low suspicion for DIC in setting of endocarditis c/b septic emboli.  - avoid nephrotoxic agents     - checked bladder scan ()  - holding torsemide    Problem: Need for prophylactic measure  Assessment and Plan: DVT PPx: Thrombocytopenia close to 50K, holding pharmcologic, SCDs for now  Diet: DASH (likely to need CC after A1C). 64 y/o M w/t PMHx of bioprosthetic AV & MV c/b endocarditis (on ceftriaxone and rifampin), hypothyroidism, BPH, history of seizures, and HTN, who presents to the hospital from rehab for suicidal ideations found to have AMS, JANEL, fever, all 2/2 endocarditis with suspected hemorrhagic septic emboli.

## 2019-03-19 NOTE — PROGRESS NOTE ADULT - NSICDXPROBLEM_GEN_ALL_CORE_FT
PROBLEM DIAGNOSES  Problem: Endocarditis  Assessment and Plan: Recent, known hospitalization for endocarditis now c/b septic emboli. TTE confirms 3cm vegetation on bioprosthetic MV. BCx NGTD x2. s/p CARLY showing linear echo density on MV.   -CTS recs  -Cards recs  -ID recs  -CARLY per CTS   -1g vanc IV Q24H (vanc trough prior to 3rd dose)  -2g cefepime IV QD  -300mg rifampin PO TID    Problem: Septic embolism  Assessment and Plan: Patient w/t new AMS per brother and CTH finding of hypodensity in occipital lobe concerning for septic emboli causing stroke in the setting of endocarditis  -Neuro recs: MR brain & MRA H&N, cerebral CT angio prior to CT surg   -Endocarditis management as above    Problem: Pancytopenia  Assessment and Plan: May be marrow suppression 2/2 sepsis. Will r/o viral eitiologies. Anemia possibly in setting of CKD. Possible platelets being used up from DIC in setting of endocarditis throwing emboli and renal dysfunction.  -Check HIV, parvo, CMV neg, EBV neg  -Hold AC for now as Plts are very close to 50K  -Will obtain records from [facility]    Problem: Anemia  Assessment and Plan:     Problem: CHF (congestive heart failure)  Assessment and Plan: - chronic, unknown if systolic or diastolic   - patient on several medications to suggest CHF but brother did not know of any history    - attempt to get records from [facility]    - no signs of acute CHF on PE but will monitor carefully    Problem: History of heart valve replacement  Assessment and Plan: -4mg (120 pills) of warfarin last dispensed 12/18. INR subtherapeutic. Unclear how long patient was off AC given recent hospitalization or if it was held 2/2 thrombocytopenia.   -Will discuss w/t cardiologist Dr. Lachmann, although this may have been done by [facility] [facility]. Will attempt to get medical records.     Problem: Seizures  Assessment and Plan: - continue keppra     Problem: Acute-on-chronic kidney injury  Assessment and Plan: - patient admitted for JANEL a per rehab reports, discharge Cr was 2.5 and currently Cr is >3    - likely pre-renal as patient has been stating he is vomiting when he eats. Also has been refusing to eat. Low suspicion for DIC in setting of endocarditis c/b septic emboli.  - avoid nephrotoxic agents     - check bladder scan ()  - holding torsemide    Problem: Need for prophylactic measure  Assessment and Plan: DVT PPx: Thrombocytopenia close to 50K, holding pharmcologic, SCDs for now  Diet: DASH (likely to need CC after A1C) PROBLEM DIAGNOSES  Problem: Endocarditis  Assessment and Plan: Recent, known hospitalization for endocarditis now c/b septic emboli. TTE confirms 3cm vegetation on bioprosthetic MV. BCx NGTD x2. s/p CARLY showing linear echo density on MV. Patient with persistent infection for > 7 days of abx therapy and persistent vegetation c/b emboli in spite of abx therapy so CT surgery on board. Per CT surg the patient has a hx of non-compliance which was why [facility] did not perform surgery and CT surg is recommending medical management alone at this time.  -CTS recs  -Cards recs  -ID recs  -CARLY per CTS   -1g vanc IV Q24H (vanc trough prior to 3rd dose)  -2g cefepime IV QD  -300mg rifampin PO TID    Problem: Septic embolism  Assessment and Plan: Patient w/t new AMS per brother and CTH finding of hypodensity in occipital lobe concerning for septic emboli causing stroke in the setting of endocarditis. Now c/b hemorrhagic conversion on MRI.   -Neuro recs: CT angio prior to CT surg   -Endocarditis management as above    Problem: Pancytopenia  Assessment and Plan: May be marrow suppression 2/2 sepsis. Will r/o viral eitiologies. Anemia possibly in setting of CKD. Possible platelets being used up from DIC in setting of endocarditis throwing emboli and renal dysfunction.  - Parvo -pending,   - CMV neg, EBV neg, HIV neg  -Hold AC for now as Plts are very close to 50K  -Will obtain records from [employer]    Problem: Anemia  Assessment and Plan:     Problem: CHF (congestive heart failure)  Assessment and Plan: Acute on chronic HFrEF (20-25%, w/t severe LV dysfxn)   - attempt to get records from [employer]   - atorvastatin 40mg,   - coreg 12.5mg BID,   - Isordil 10mg TID,   - hydralazine 10mg TID,   - torsemide 20mg daily    Problem: Hypertension  Assessment and Plan: - coreg 12.5mg BID,   - Isordil 10mg TID,   - hydralazine 10mg TID,   - torsemide 20mg daily    Problem: History of heart valve replacement  Assessment and Plan: -4mg (120 pills) of warfarin last dispensed 12/18. INR subtherapeutic. Unclear how long patient was off AC given recent hospitalization or if it was held 2/2 thrombocytopenia.   -Will discuss w/t cardiologist Dr. Lachmann, although this may have been done by [facility]. Will attempt to get medical records.     Problem: Seizures  Assessment and Plan: - continue keppra     Problem: Acute-on-chronic kidney injury  Assessment and Plan: - patient admitted for JANEL a per rehab reports, discharge Cr was 2.5 and currently Cr is >3    - likely pre-renal as patient has been stating he is vomiting when he eats. Also has been refusing to eat. Low suspicion for DIC in setting of endocarditis c/b septic emboli.  - avoid nephrotoxic agents     - check bladder scan ()  - holding torsemide    Problem: Need for prophylactic measure  Assessment and Plan: DVT PPx: Thrombocytopenia close to 50K, holding pharmcologic, SCDs for now  Diet: DASH (likely to need CC after A1C)

## 2019-03-19 NOTE — PROGRESS NOTE ADULT - SUBJECTIVE AND OBJECTIVE BOX
Dr. Garret Sue   Internal Medicine PGY-1  Pager #[phone]    Patient is a 63y old  Male who presents with a chief complaint of JANEL (18 Mar 2019 18:49)      SUBJECTIVE / OVERNIGHT EVENTS:  CARLY w/t veg on MV.     MEDICATIONS  (STANDING):  atorvastatin 40 milliGRAM(s) Oral at bedtime  carvedilol 12.5 milliGRAM(s) Oral every 12 hours  cefepime   IVPB 2000 milliGRAM(s) IV Intermittent every 24 hours  hydrALAZINE 10 milliGRAM(s) Oral three times a day  isosorbide   dinitrate Tablet (ISORDIL) 10 milliGRAM(s) Oral three times a day  levETIRAcetam 500 milliGRAM(s) Oral two times a day  levothyroxine 200 MICROGram(s) Oral daily  potassium chloride    Tablet ER 40 milliEquivalent(s) Oral once  rifampin 300 milliGRAM(s) Oral three times a day  senna 2 Tablet(s) Oral at bedtime  sodium bicarbonate 650 milliGRAM(s) Oral two times a day  tamsulosin 0.4 milliGRAM(s) Oral at bedtime  torsemide 20 milliGRAM(s) Oral daily  vancomycin  IVPB 1000 milliGRAM(s) IV Intermittent every 24 hours    MEDICATIONS  (PRN):      Vital Signs Last 24 Hrs  T(C): 36.4 (19 Mar 2019 04:12), Max: 37 (18 Mar 2019 09:30)  T(F): 97.6 (19 Mar 2019 04:12), Max: 98.6 (18 Mar 2019 09:30)  HR: 67 (19 Mar 2019 04:12) (66 - 73)  BP: 123/81 (19 Mar 2019 04:12) (117/74 - 134/83)  BP(mean): --  RR: 18 (19 Mar 2019 04:12) (18 - 18)  SpO2: 98% (19 Mar 2019 04:12) (96% - 98%)  CAPILLARY BLOOD GLUCOSE        I&O's Summary    18 Mar 2019 07:01  -  19 Mar 2019 07:00  --------------------------------------------------------  IN: 0 mL / OUT: 0 mL / NET: 0 mL        PHYSICAL EXAM:  GENERAL: NAD, well-developed  HEAD:  Atraumatic, Normocephalic  EYES: EOMI, PERRLA, conjunctiva and sclera clear  NECK: Supple, No JVD  CHEST/LUNG: Clear to auscultation bilaterally; No wheeze  HEART: Regular rate and rhythm; No murmurs, rubs, or gallops  ABDOMEN: Soft, Nontender, Nondistended; Bowel sounds present  EXTREMITIES:  2+ Peripheral Pulses, No clubbing, cyanosis, or edema  PSYCH: AAOx3  NEUROLOGY: non-focal  SKIN: No rashes or lesions    LABS:                        9.4    7.98  )-----------( 90       ( 18 Mar 2019 08:28 )             29.4     03-18    140  |  101  |  26<H>  ----------------------------<  110<H>  3.4<L>   |  25  |  2.50<H>    Ca    9.4      18 Mar 2019 06:45  Phos  3.7     03-18  Mg     2.0     03-18      PT/INR - ( 18 Mar 2019 08:29 )   PT: 14.8 sec;   INR: 1.29 ratio         PTT - ( 18 Mar 2019 08:29 )  PTT:44.4 sec          RADIOLOGY & ADDITIONAL TESTS:    Imaging Personally Reviewed:    Consultant(s) Notes Reviewed:      Care Discussed with Consultants/Other Providers: Dr. Garret Sue   Internal Medicine PGY-1  Pager #[phone]    Patient is a 63y old  Male who presents with a chief complaint of JANEL (18 Mar 2019 18:49)      SUBJECTIVE / OVERNIGHT EVENTS:  CARLY w/t veg on MV. Acutely altered this AM, AAOx1 (self). Denies CP, SOB, abdominal pain, palpitations, focal neuro deficits. Denies fevers, sweats, and chills.    Tele: NSR 70-80s.     MEDICATIONS  (STANDING):  atorvastatin 40 milliGRAM(s) Oral at bedtime  carvedilol 12.5 milliGRAM(s) Oral every 12 hours  cefepime   IVPB 2000 milliGRAM(s) IV Intermittent every 24 hours  hydrALAZINE 10 milliGRAM(s) Oral three times a day  isosorbide   dinitrate Tablet (ISORDIL) 10 milliGRAM(s) Oral three times a day  levETIRAcetam 500 milliGRAM(s) Oral two times a day  levothyroxine 200 MICROGram(s) Oral daily  potassium chloride    Tablet ER 40 milliEquivalent(s) Oral once  rifampin 300 milliGRAM(s) Oral three times a day  senna 2 Tablet(s) Oral at bedtime  sodium bicarbonate 650 milliGRAM(s) Oral two times a day  tamsulosin 0.4 milliGRAM(s) Oral at bedtime  torsemide 20 milliGRAM(s) Oral daily  vancomycin  IVPB 1000 milliGRAM(s) IV Intermittent every 24 hours    MEDICATIONS  (PRN):      Vital Signs Last 24 Hrs  T(C): 36.4 (19 Mar 2019 04:12), Max: 37 (18 Mar 2019 09:30)  T(F): 97.6 (19 Mar 2019 04:12), Max: 98.6 (18 Mar 2019 09:30)  HR: 67 (19 Mar 2019 04:12) (66 - 73)  BP: 123/81 (19 Mar 2019 04:12) (117/74 - 134/83)  BP(mean): --  RR: 18 (19 Mar 2019 04:12) (18 - 18)  SpO2: 98% (19 Mar 2019 04:12) (96% - 98%)  CAPILLARY BLOOD GLUCOSE        I&O's Summary    18 Mar 2019 07:01  -  19 Mar 2019 07:00  --------------------------------------------------------  IN: 0 mL / OUT: 0 mL / NET: 0 mL        PHYSICAL EXAM:  GENERAL: AAOx1, laughing inappropriately given conversation.   HEAD:  Atraumatic, Normocephalic  EYES: EOMI, PERRLA, conjunctiva and sclera clear  NECK: Supple, No JVD  CHEST/LUNG: Clear to auscultation bilaterally; No wheeze  HEART: Regular rate and rhythm; No murmurs, rubs, or gallops  ABDOMEN: Soft, Nontender, Nondistended; Bowel sounds present  EXTREMITIES:  2+ Peripheral Pulses, No clubbing, cyanosis, or edema  PSYCH: AAOx1, inappropriate affect.   NEUROLOGY: non-focal  SKIN: No rashes or lesions    LABS:                        9.4    7.98  )-----------( 90       ( 18 Mar 2019 08:28 )             29.4     03-18    140  |  101  |  26<H>  ----------------------------<  110<H>  3.4<L>   |  25  |  2.50<H>    Ca    9.4      18 Mar 2019 06:45  Phos  3.7     03-18  Mg     2.0     03-18      PT/INR - ( 18 Mar 2019 08:29 )   PT: 14.8 sec;   INR: 1.29 ratio         PTT - ( 18 Mar 2019 08:29 )  PTT:44.4 sec          RADIOLOGY & ADDITIONAL TESTS:    Imaging Personally Reviewed:    Consultant(s) Notes Reviewed:      Care Discussed with Consultants/Other Providers:

## 2019-03-19 NOTE — PROGRESS NOTE ADULT - ASSESSMENT
63 year old male with recent admission for endocarditis discharged on ceftriaxone and rifampin via right arm picc, hypothyroidism, ?nephrectomy, BPH, history of seizures, and HTN who presents to the hospital from rehab for suicidal ideations.     At NH, he told someone from the staff that he wanted to kill himself so he was brought to the ED  s/p 2 heart valve replacements (Aortic and mitral valve), last replacement was 2 year ago     While at [place], found to have endocarditis on ceftriaxone and rifampin - was reported as culture negative endocarditis    Right arm picc   Now afebrile  TTE with prosthetic mitral valve with vegetation, has also prosthetic aortic valve  CT head/ MRI Head concerning for septic emboli, new areas of hemorrhage within the Right cerebellar hemisphere and right occipital lobe  Anemia, thrombocytopenia  Remains with JANEL  CMV serologies negative  EBV serologies consistent with prior infection  HIV negative    Recommend:  -Continue with vancomycin and cefepime renally dosed  -Continue rifampin  -Will need to obtain chart from [place]  -CARLY  -Neurology input  -Cardiothoracic surgery input    For Culture negative endocarditis, would F/U:  -Quant TB Gold  -Bartonella serologies  -Q fever serologies  -Urine legionella  -Brucella serologies  -Chlamydia serologies

## 2019-03-19 NOTE — PROGRESS NOTE ADULT - SUBJECTIVE AND OBJECTIVE BOX
CARSON THOMPSON 63y [id number]    Patient is a 63y old  Male who presents with a chief complaint of JANEL (19 Mar 2019 07:52)      Follow Up/CC:  ID following for endocarditis    Interval History/ROS: no fever, no complaints    Allergies    penicillin (Swelling)    Intolerances        ANTIMICROBIALS:  cefepime   IVPB 2000 every 24 hours  rifampin 300 three times a day  vancomycin  IVPB 1000 every 24 hours      MEDICATIONS  (STANDING):  atorvastatin 40 milliGRAM(s) Oral at bedtime  carvedilol 12.5 milliGRAM(s) Oral every 12 hours  cefepime   IVPB 2000 milliGRAM(s) IV Intermittent every 24 hours  hydrALAZINE 10 milliGRAM(s) Oral three times a day  isosorbide   dinitrate Tablet (ISORDIL) 10 milliGRAM(s) Oral three times a day  levETIRAcetam 500 milliGRAM(s) Oral two times a day  levothyroxine 200 MICROGram(s) Oral daily  rifampin 300 milliGRAM(s) Oral three times a day  senna 2 Tablet(s) Oral at bedtime  sodium bicarbonate 650 milliGRAM(s) Oral two times a day  tamsulosin 0.4 milliGRAM(s) Oral at bedtime  torsemide 20 milliGRAM(s) Oral daily  vancomycin  IVPB 1000 milliGRAM(s) IV Intermittent every 24 hours    MEDICATIONS  (PRN):        Vital Signs Last 24 Hrs  T(C): 36.3 (19 Mar 2019 11:42), Max: 36.8 (18 Mar 2019 20:07)  T(F): 97.4 (19 Mar 2019 11:42), Max: 98.3 (18 Mar 2019 20:07)  HR: 77 (19 Mar 2019 11:42) (67 - 77)  BP: 127/83 (19 Mar 2019 11:42) (117/74 - 134/83)  BP(mean): --  RR: 18 (19 Mar 2019 11:42) (18 - 18)  SpO2: 96% (19 Mar 2019 11:42) (96% - 98%)    CBC Full  -  ( 19 Mar 2019 12:40 )  WBC Count : 6.95 K/uL  Hemoglobin : 9.8 g/dL  Hematocrit : 31.9 %  Platelet Count - Automated : 93 K/uL  Mean Cell Volume : 88.9 fl  Mean Cell Hemoglobin : 27.3 pg  Mean Cell Hemoglobin Concentration : 30.7 gm/dL  Auto Neutrophil # : x  Auto Lymphocyte # : x  Auto Monocyte # : x  Auto Eosinophil # : x  Auto Basophil # : x  Auto Neutrophil % : x  Auto Lymphocyte % : x  Auto Monocyte % : x  Auto Eosinophil % : x  Auto Basophil % : x    03-19    138  |  100  |  25<H>  ----------------------------<  112<H>  3.6   |  24  |  2.41<H>    Ca    9.4      19 Mar 2019 08:12  Phos  2.9     03-19  Mg     2.0     03-19            MICROBIOLOGY:  .Urine Catheterized  03-18-19   No growth  --  --      .Blood Blood-Peripheral  03-15-19   No growth to date.  --  --          CMV IgG Antibody: 0.27 U/mL (03-17-19 @ 12:53)      RADIOLOGY    < from: Xray Chest 1 View- PORTABLE-Urgent (03.18.19 @ 21:39) >  Tip of the right PICC within SVC. Mild left basilar atelectasis.    No consolidation, pleural effusion or pneumothorax. No pulmonary edema.    The cardiac silhouette remains enlarged. Sternotomy, aortic and mitral   valve replacement. Mitral appendage occlusion.

## 2019-03-20 NOTE — PHYSICAL THERAPY INITIAL EVALUATION ADULT - FOLLOWS COMMANDS/ANSWERS QUESTIONS, REHAB EVAL
25% of the time/unable to follow multi-step instructions/50% of the time/able to follow single-step instructions

## 2019-03-20 NOTE — PHYSICAL THERAPY INITIAL EVALUATION ADULT - GAIT DEVIATIONS NOTED, PT EVAL
decreased stride length/decreased weight-shifting ability/decreased manuel/decreased step length/increased time in double stance

## 2019-03-20 NOTE — PHYSICAL THERAPY INITIAL EVALUATION ADULT - TRANSFER SAFETY CONCERNS NOTED: SIT/STAND, REHAB EVAL
decreased weight-shifting ability/decreased step length/decreased safety awareness/decreased sequencing ability

## 2019-03-20 NOTE — PROGRESS NOTE ADULT - SUBJECTIVE AND OBJECTIVE BOX
Dr. Garret Sue   Internal Medicine PGY-1  Pager #[phone]    Patient is a 63y old  Male who presents with a chief complaint of JANEL (19 Mar 2019 11:58)      SUBJECTIVE / OVERNIGHT EVENTS:  MINA ON. Refusing PO meds this AM.     MEDICATIONS  (STANDING):  atorvastatin 40 milliGRAM(s) Oral at bedtime  carvedilol 12.5 milliGRAM(s) Oral every 12 hours  cefepime   IVPB 2000 milliGRAM(s) IV Intermittent every 24 hours  hydrALAZINE 10 milliGRAM(s) Oral three times a day  isosorbide   dinitrate Tablet (ISORDIL) 10 milliGRAM(s) Oral three times a day  levETIRAcetam 500 milliGRAM(s) Oral two times a day  levothyroxine 200 MICROGram(s) Oral daily  rifampin IVPB 300 milliGRAM(s) IV Intermittent every 8 hours  senna 2 Tablet(s) Oral at bedtime  sodium bicarbonate 650 milliGRAM(s) Oral two times a day  tamsulosin 0.4 milliGRAM(s) Oral at bedtime  torsemide 20 milliGRAM(s) Oral daily  vancomycin  IVPB 1000 milliGRAM(s) IV Intermittent every 24 hours    MEDICATIONS  (PRN):      Vital Signs Last 24 Hrs  T(C): 36.7 (20 Mar 2019 05:44), Max: 36.9 (19 Mar 2019 19:31)  T(F): 98.1 (20 Mar 2019 05:44), Max: 98.5 (19 Mar 2019 20:41)  HR: 84 (20 Mar 2019 05:44) (61 - 84)  BP: 99/67 (20 Mar 2019 05:44) (99/67 - 130/81)  BP(mean): --  RR: 18 (20 Mar 2019 05:44) (18 - 18)  SpO2: 97% (20 Mar 2019 05:44) (96% - 98%)  CAPILLARY BLOOD GLUCOSE        I&O's Summary    18 Mar 2019 07:01  -  19 Mar 2019 07:00  --------------------------------------------------------  IN: 0 mL / OUT: 0 mL / NET: 0 mL    19 Mar 2019 07:01  -  20 Mar 2019 06:52  --------------------------------------------------------  IN: 120 mL / OUT: 0 mL / NET: 120 mL      PHYSICAL EXAM:  GENERAL: AAOx1, laughing inappropriately given conversation.   HEAD:  Atraumatic, Normocephalic  EYES: EOMI, PERRLA, conjunctiva and sclera clear  NECK: Supple, No JVD  CHEST/LUNG: Clear to auscultation bilaterally; No wheeze  HEART: Regular rate and rhythm; No murmurs, rubs, or gallops  ABDOMEN: Soft, Nontender, Nondistended; Bowel sounds present  EXTREMITIES:  2+ Peripheral Pulses, No clubbing, cyanosis, or edema  PSYCH: AAOx1, inappropriate affect.   NEUROLOGY: non-focal  SKIN: No rashes or lesions    LABS:                        9.8    6.95  )-----------( 93       ( 19 Mar 2019 12:40 )             31.9     03-19    138  |  100  |  25<H>  ----------------------------<  112<H>  3.6   |  24  |  2.41<H>    Ca    9.4      19 Mar 2019 08:12  Phos  2.9     03-19  Mg     2.0     03-19      PT/INR - ( 19 Mar 2019 12:28 )   PT: 14.4 sec;   INR: 1.25 ratio         PTT - ( 19 Mar 2019 12:28 )  PTT:43.0 sec          RADIOLOGY & ADDITIONAL TESTS:    Imaging Personally Reviewed:    Consultant(s) Notes Reviewed:      Care Discussed with Consultants/Other Providers: Dr. Garret Sue   Internal Medicine PGY-1  Pager #[phone]    Patient is a 63y old  Male who presents with a chief complaint of JANEL (19 Mar 2019 11:58)      SUBJECTIVE / OVERNIGHT EVENTS:  MINA ON. Refusing PO meds this AM. Oriented to self, place, and year this morning. Denies HA, blurry vision, and focal neuro deficits. Denies CP, SOB, and abdominal pain. No palpitations, fevers, sweats, or chills.     MEDICATIONS  (STANDING):  atorvastatin 40 milliGRAM(s) Oral at bedtime  carvedilol 12.5 milliGRAM(s) Oral every 12 hours  cefepime   IVPB 2000 milliGRAM(s) IV Intermittent every 24 hours  hydrALAZINE 10 milliGRAM(s) Oral three times a day  isosorbide   dinitrate Tablet (ISORDIL) 10 milliGRAM(s) Oral three times a day  levETIRAcetam 500 milliGRAM(s) Oral two times a day  levothyroxine 200 MICROGram(s) Oral daily  rifampin IVPB 300 milliGRAM(s) IV Intermittent every 8 hours  senna 2 Tablet(s) Oral at bedtime  sodium bicarbonate 650 milliGRAM(s) Oral two times a day  tamsulosin 0.4 milliGRAM(s) Oral at bedtime  torsemide 20 milliGRAM(s) Oral daily  vancomycin  IVPB 1000 milliGRAM(s) IV Intermittent every 24 hours    MEDICATIONS  (PRN):      Vital Signs Last 24 Hrs  T(C): 36.7 (20 Mar 2019 05:44), Max: 36.9 (19 Mar 2019 19:31)  T(F): 98.1 (20 Mar 2019 05:44), Max: 98.5 (19 Mar 2019 20:41)  HR: 84 (20 Mar 2019 05:44) (61 - 84)  BP: 99/67 (20 Mar 2019 05:44) (99/67 - 130/81)  BP(mean): --  RR: 18 (20 Mar 2019 05:44) (18 - 18)  SpO2: 97% (20 Mar 2019 05:44) (96% - 98%)  CAPILLARY BLOOD GLUCOSE        I&O's Summary    18 Mar 2019 07:01  -  19 Mar 2019 07:00  --------------------------------------------------------  IN: 0 mL / OUT: 0 mL / NET: 0 mL    19 Mar 2019 07:01  -  20 Mar 2019 06:52  --------------------------------------------------------  IN: 120 mL / OUT: 0 mL / NET: 120 mL      PHYSICAL EXAM:  GENERAL: AAOx2, laughing inappropriately given conversation.   HEAD:  Atraumatic, Normocephalic  EYES: EOMI, PERRLA, conjunctiva and sclera clear  NECK: Supple, No JVD  CHEST/LUNG: Clear to auscultation bilaterally; No wheeze  HEART: Regular rate and rhythm; No murmurs, rubs, or gallops  ABDOMEN: Soft, Nontender, Nondistended; Bowel sounds present  EXTREMITIES:  2+ Peripheral Pulses, No clubbing, cyanosis, or edema  PSYCH: AAOx2, inappropriate affect.   NEUROLOGY: non-focal  SKIN: No rashes or lesions    LABS:                        9.8    6.95  )-----------( 93       ( 19 Mar 2019 12:40 )             31.9     03-19    138  |  100  |  25<H>  ----------------------------<  112<H>  3.6   |  24  |  2.41<H>    Ca    9.4      19 Mar 2019 08:12  Phos  2.9     03-19  Mg     2.0     03-19      PT/INR - ( 19 Mar 2019 12:28 )   PT: 14.4 sec;   INR: 1.25 ratio         PTT - ( 19 Mar 2019 12:28 )  PTT:43.0 sec          RADIOLOGY & ADDITIONAL TESTS:    Imaging Personally Reviewed:    Consultant(s) Notes Reviewed:      Care Discussed with Consultants/Other Providers:

## 2019-03-20 NOTE — PHYSICAL THERAPY INITIAL EVALUATION ADULT - PERTINENT HX OF CURRENT PROBLEM, REHAB EVAL
pt is a 63M presents to the hospital from rehab for suicidal ideations. Per ED & nursing home documentation, the pt has been non compliant with his medications. Today, he told someone from the staff that he wanted to kill himself so he was brought to the ED. When seen, the pt repeatedly stated that he was upset that at [employer] whenever he ate food, he would become nauseous & vomit. He believed there was a "bacteria contaminating the food."

## 2019-03-20 NOTE — PROGRESS NOTE ADULT - ASSESSMENT
62 y/o M w/t PMHx of bioprosthetic AV & MV c/b endocarditis (on ceftriaxone and rifampin), hypothyroidism, BPH, history of seizures, and HTN, who presents to the hospital from rehab for suicidal ideations found to have AMS, JANEL, fever, all 2/2 endocarditis with suspected hemorrhagic septic emboli by . 64 y/o M w/t PMHx of bioprosthetic AV & MV c/b endocarditis (on ceftriaxone and rifampin), hypothyroidism, BPH, history of seizures, and HTN, who presents to the hospital from rehab for suicidal ideations found to have AMS, JANEL, fever, all 2/2 endocarditis with suspected hemorrhagic septic emboli by MR. TORREZ conversation w/t brother today. CT surg to discuss case w/t brother as well.

## 2019-03-20 NOTE — PROGRESS NOTE ADULT - ASSESSMENT
62 yo M with a PMH significant for prosthetic valve endocarditis(on ceftriaxone and rifampin), HFrEF?, hypothyroidism, BPH, history of seizures, and HTN who was sent to the hospital from rehab due to suicidal ideation. Cardiology consulted for endocarditis management given fever.    #Prosthetic valve endocarditis  Reported history of endocarditis with ceftriaxone and rifampin as outpatient. TTE with 3cm vegetation on mitral valve. MRI brain with evidence of likely embolization in right cerebellar hemisphere and right occipital lobes. CARLY with "Thick echodense layer of vegetation is seen coating the atrial surface of the mitral valve leaflets. There is an about 3cm linear, highly mobile, echodensity attached to the tip of one of the leaflets and prolapses into the left ventricle."  - CT surgery has been consulted, f/u recs  - continue Abx pr ID    #HFrEF  Not volume overloaded on exam. TTE with EF 20-25%.  - continue outpatient medications: atorvastatin 40mg, coreg 12.5mg BID, Isordil 10mg TID, hydralazine 10mg TID, torsemide 20mg daily

## 2019-03-20 NOTE — PHYSICAL THERAPY INITIAL EVALUATION ADULT - PRECAUTIONS/LIMITATIONS, REHAB EVAL
He was frustrated by this & told the staff that he wanted to kill himself to get out of the nursing home. He is AAOx3 otherwise. Spoke with brother Renee hunter states 1 month ago the pt's brother went to go see him at his home & found the pt unresponsive on the ground. EMS brought him to [facility], then transferred to [place]. While at [place], was found to have endocarditis. He was sent to rehab 3 days ago. The brother states he does not have any history of psychiatric issues but the pt told him he told the staff at rehab that he was suicidal because no one was addressing his concern regarding the "bacteria in the food."
To improve functional mobility

## 2019-03-20 NOTE — CHART NOTE - NSCHARTNOTEFT_GEN_A_CORE
Family meeting with patient, his brother Renee Linares, MS3 Erlinda Frazier, senior medicine resident Dr. Aryles Hedjar, PGY-3, and (by phone) thoracic surgeon Dr. Russ Cheek.    Discussed patient's medical status, that he has a serious infection in his heart along with strokes in his brain suspected due to clots from his heart. We discussed that based on the imaging, he has new hemorrhagic lesions in his brain not seen on the CT scan in [place], and that based on comparison of TEEs, he has     Patient was largely quiet, but when asked the patient what he understood about the conversation, he said "everything is still there." Family meeting with patient, his brother Renee Linares, MS3 Erlinda Freddie, senior medicine resident Dr. Aryles Hedjar, PGY-3, and (by phone) thoracic surgeon Dr. Russ Cheek.    Discussed patient's medical status, that he has a serious infection in his heart along with strokes in his brain suspected due to clots from his heart. We discussed that based on the imaging, he has new hemorrhagic lesions in his brain not seen on the CT scan in [place], and that based on comparison of TEEs, he has a 3 cm mobile echodensity on his mitral valve, as opposed to two ~0.6x1.0 and 0.6x0.4 cm echo-densities (1.9 cm vertical angle) at [place], which suggests that the vegetations are worsening. We discussed with Dr. Cheek, who had discussed with the surgeon at [place] who noted that the patient was noncompliant. In addition, records from [place] show that from 2/27-3/12, he refused Rifampin on 3/4 and 3/11, as well as Ceftriaxone on 3/11. Reportedly, he refused medications numerous times in rehab as well. Due to missed doses of medications it was determined that it was difficult to tell whether the increase in vegetations was duet to failure of antibiotic therapy, and there was concern that even a few missed doses could cause vegetation spread (corroborated by ID physician later). The noncompliance was a concern for Dr. Cheek as well, who felt that without definitive evidence of compliance to predict for follow up, the risks of doing surgery would outweigh the harms. In addition, due to the patient's hemorrhagic strokes, surgery would not be recommended for this patient unless for an urgent need, such as the patient being hemodynamically unstable or persistently febrile/septic shock, which the patient was not. For that reason, he would need to wait at least 3-4 and likely 6 weeks to be a candidate for surgical intervention per Dr. Cheek. Patient's brother did note that previously he had told him that he did not want surgery, but was unsure if the patient was coherent. Dr. Cheek noted the patient told her he wanted surgery when she spoke to him.    Therefore, he will need to remain on antibiotics for approximately 6 weeks (exact stop date TBD by ID) via a PICC line, and he will be discharged to rehab. He will be re-evaluated by the thoracic surgery and infectious disease outpatient to determine compliance with medications, and likely repeat imaging including CARLY and CT to determine progress of treatment. Patient's brother is aware that the patient may still yet not be a candidate for surgery. All of his antibiotics will be IV. Per RN, the PICC line is flushing with more difficulty, so will plan for repeat PICC line placement tomorrow or Friday.  is aware of plan for rehab and has been in contact with patient's brother Renee Linares for choices.    Patient was largely quiet, but when asked the patient what he understood about the conversation, he said "everything is still there." He (and his brother) was reminded of the importance of taking his medications EVERY time, and they were told that if he does not take them, then he would likely not be a candidate for surgery. The patient, however, does not have capacity to make medical decisions for himself, as he was unable to further explain his disease process and the plan but nodded in agreement with the plan that we discussed.    Renee Linares subsequently relayed the information to his siblings.    Aryles Hedjar, PGY-3  Pager [facility] [phone]  Department of Internal Medicine, Team 3      Update: will also discuss with patient's cardiologist, Dr. Justine Lachmann, [facility], in AM (does not come to [facility]) about need to restart anticoagulation, but I would lean towards not anticoagulating for now in the setting of hemorrhagic strokes.

## 2019-03-20 NOTE — PHYSICAL THERAPY INITIAL EVALUATION ADULT - BALANCE TRAINING, PT EVAL
GOAL: pt will increase standing dynamic/static balance to fair in order to improve safety with functional mobility in 2 weeks

## 2019-03-20 NOTE — PHYSICAL THERAPY INITIAL EVALUATION ADULT - IMPAIRMENTS FOUND, PT EVAL
gait, locomotion, and balance/muscle strength/aerobic capacity/endurance/cognitive impairment/poor safety awareness

## 2019-03-20 NOTE — PHYSICAL THERAPY INITIAL EVALUATION ADULT - ADDITIONAL COMMENTS
MR Head 3.17/19: Multiple focal areas of restricted diffusion suggesting new areas of hemorrhage within the right cerebellar hemisphere and within the right occipital lobe, with hemorrhagic transformation in the right occipital lobe. Tortuous extracranial vessels likely reflecting hypertension with stenoses of the distal middle and posterior cerebral artery branch vasculature.  CT Head/Chest 3/15: Low-density in the right occipital lobe suspicious for a recent infarct. Given history of endocarditis, the infarct may be embolic in nature

## 2019-03-20 NOTE — PHYSICAL THERAPY INITIAL EVALUATION ADULT - STRENGTHENING, PT EVAL
GOAL: Pt will improve strength to at least a 3+/5 in order to improve safety with functional mobility in 2 weeks

## 2019-03-20 NOTE — PROGRESS NOTE ADULT - SUBJECTIVE AND OBJECTIVE BOX
Referring: Cleopatra    Overnight Events: AKSHAT, continues to refuse medications. Refuses to answer questions again this am.    ROS: unable to assess 2/2 to mental status    Current Meds:  atorvastatin 40 milliGRAM(s) Oral at bedtime  carvedilol 12.5 milliGRAM(s) Oral every 12 hours  cefepime   IVPB 2000 milliGRAM(s) IV Intermittent every 24 hours  hydrALAZINE 10 milliGRAM(s) Oral three times a day  isosorbide   dinitrate Tablet (ISORDIL) 10 milliGRAM(s) Oral three times a day  levETIRAcetam 500 milliGRAM(s) Oral two times a day  levothyroxine 200 MICROGram(s) Oral daily  rifampin IVPB 300 milliGRAM(s) IV Intermittent every 8 hours  senna 2 Tablet(s) Oral at bedtime  sodium bicarbonate 650 milliGRAM(s) Oral two times a day  tamsulosin 0.4 milliGRAM(s) Oral at bedtime  torsemide 20 milliGRAM(s) Oral daily  vancomycin  IVPB 1000 milliGRAM(s) IV Intermittent every 24 hours      Vitals:  T(F): 98.1 (03-20), Max: 98.5 (03-19)  HR: 84 (03-20) (61 - 84)  BP: 99/67 (03-20) (99/67 - 130/81)  RR: 18 (03-20)  SpO2: 97% (03-20)  I&O's Summary    19 Mar 2019 07:01  -  20 Mar 2019 07:00  --------------------------------------------------------  IN: 120 mL / OUT: 0 mL / NET: 120 mL      Physical Exam:  GENERAL: No acute distress, nude  HEAD:  Atraumatic, Normocephalic  ENT:Neck supple, No JVD  CHEST/LUNG: Clear to auscultation bilaterally anteriorly; No wheeze, equal breath sounds bilaterally   HEART: Regular rate and rhythm; No murmurs, rubs, or gallops  ABDOMEN: Soft, Nontender, Nondistended; Bowel sounds present  EXTREMITIES:  No clubbing, cyanosis, or edema  PSYCH: strange affect  NEUROLOGY: AAOx3, non-focal, cranial nerves intact  SKIN: Normal color, No rashes or lesions                                            9.8    6.95  )-----------( 93       ( 19 Mar 2019 12:40 )             31.9     03-19    138  |  100  |  25<H>  ----------------------------<  112<H>  3.6   |  24  |  2.41<H>    Ca    9.4      19 Mar 2019 08:12  Phos  2.9     03-19  Mg     2.0     03-19      PT/INR - ( 19 Mar 2019 12:28 )   PT: 14.4 sec;   INR: 1.25 ratio         PTT - ( 19 Mar 2019 12:28 )  PTT:43.0 sec  CARDIAC MARKERS ( 16 Mar 2019 05:25 )  464 ng/L / x     / x     / x     / x     / x      CARDIAC MARKERS ( 16 Mar 2019 05:24 )  x     / x     / x     / 109 U/L / x     / x      CARDIAC MARKERS ( 15 Mar 2019 21:13 )  488 ng/L / x     / x     / x     / x     / x      CARDIAC MARKERS ( 15 Mar 2019 17:25 )  502 ng/L / x     / x     / x     / x     / x            Echo:  3/18/19 CARLY  Conclusions:  1. Bioprosthetic mitral valve replacement. The valve  appears to be well seated. No sign of dehiscence. Thick  echodense layer of vegetation is seen coating the atrial  surface of the mitral valve leaflets. There is an about 3cm  linear, highly mobile, echodensity attached to the tip of  one of the leaflets and prolapses into the left ventricle.  Mild mitral regurgitation. No paravalvular regurgitation.  Mean transmitral valve gradient equals 4 mm Hg, which is  probably normal in the setting of a bioprosthetic mitral  valve replacement.  2. Bioprosthetic aortic valve in good position. No evidence  for endocarditis. Peak transaortic valve gradient equals 25  mm Hg, mean transaortic valve gradient equals 16 mm Hg,  which is probably normal in the presence of a bioprosthetic  aortic valve. Mild aortic regurgitation.  3. Severe global left ventricular systolic dysfunction.  4. Right ventricular enlargement with decreased right  ventricular systolic function.      3/15/19  EF 20-25%  Conclusions:  1. Bioprosthetic mitral valve replacement. There is a long  about 3 cm echogenic mass on the mitral valve strongly  suggestive of vegetation. Correlate clinically.  Mild  mitral regurgitation.  Mean transmitral valve gradient  equals 8 mm Hg, which is elevated even in the setting of a  bioprosthetic mitral valve replacement.  2. Bioprosthetic aortic valve. Peak transaortic valve  gradient equals 29 mm Hg, mean transaortic valve gradient  equals 12 mm Hg, which is probably normal in the presence  of a bioprosthetic aortic valve. However, he has  significant PVCs which may demonstrate elevated gradients  that are not representative of his true gradients. Suggest  reassessment of gradients across both the AV and MV once  optimized.  3. Severely dilated right and left atrium.  4. Eccentric left ventricular hypertrophy (dilated left  ventricle with normal relative wall thickness).  5. Severe global left ventricular systolic dysfunction with  regional wall motion abnormaltiies.  6. Increased E/e'  is consistent with elevated left  ventricular filling pressure.  7. Normal right ventricular size with decreased right  ventricular systolic function.  8. There is evidence of mitral valve endocarditis (known  from prior and currently on antibiotics).  *** No previous Echo exam.    Stress Testing:     Imaging:  MRI brain:  IMPRESSION:    Multiple focal areas of restricted diffusion suggesting new areas of   hemorrhage within the right cerebellar hemisphere and within the right   occipital lobe, with hemorrhagic transformation in the right occipital   lobe.    Tortuous extracranial vessels likely reflecting hypertension with   stenoses of the distal middle and posterior cerebral artery branch   vasculature. No  ICA origin hemodynamically significant stenosis by   NASCET criteria.    Interpretation of Telemetry: sinus 70s-90s

## 2019-03-20 NOTE — PROGRESS NOTE ADULT - NSICDXPROBLEM_GEN_ALL_CORE_FT
PROBLEM DIAGNOSES  Problem: Endocarditis  Assessment and Plan: Recent, known hospitalization for endocarditis now c/b septic emboli. TTE confirms 3cm vegetation on bioprosthetic MV. BCx NGTD x2. s/p CARLY showing linear echo density on MV. Patient with persistent infection for > 7 days of abx therapy and persistent vegetation c/b emboli in spite of abx therapy so CT surgery on board. Per CT surg the patient has a hx of non-compliance which was why [place] did not perform surgery and CT surg is recommending medical management alone at this time.  -CTS recs  -Cards recs  -ID recs  -1g vanc IV Q24H (vanc trough prior to 3rd dose)  -2g cefepime IV QD  -300mg rifampin PO TID    Problem: Septic embolism  Assessment and Plan: Patient w/t new AMS per brother and CTH finding of hypodensity in occipital lobe concerning for septic emboli causing stroke in the setting of endocarditis. Now c/b hemorrhagic conversion on MRI.   -Neuro recs: CT angio prior to CT surg   -Endocarditis management as above    Problem: Pancytopenia  Assessment and Plan: May be marrow suppression 2/2 sepsis. Will r/o viral eitiologies. Anemia possibly in setting of CKD. Possible platelets being used up from DIC in setting of endocarditis throwing emboli and renal dysfunction.  - Parvo -pending,   - CMV neg, EBV neg, HIV neg  -Hold AC for now as Plts are very close to 50K  -Will obtain records from [facility]    Problem: Anemia  Assessment and Plan:     Problem: CHF (congestive heart failure)  Assessment and Plan: Acute on chronic HFrEF (20-25%, w/t severe LV dysfxn)   - attempt to get records from [facility]   - atorvastatin 40mg,   - coreg 12.5mg BID,   - Isordil 10mg TID,   - hydralazine 10mg TID,   - torsemide 20mg daily    Problem: Hypertension  Assessment and Plan: - coreg 12.5mg BID,   - Isordil 10mg TID,   - hydralazine 10mg TID,   - torsemide 20mg daily    Problem: History of heart valve replacement  Assessment and Plan: -4mg (120 pills) of warfarin last dispensed 12/18. INR subtherapeutic. Unclear how long patient was off AC given recent hospitalization or if it was held 2/2 thrombocytopenia.   -Will discuss w/t cardiologist Dr. Lachmann, although this may have been done by [facility]. Will attempt to get medical records.     Problem: Seizures  Assessment and Plan: - continue keppra     Problem: Acute-on-chronic kidney injury  Assessment and Plan: - patient admitted for JANEL a per rehab reports, discharge Cr was 2.5 and currently Cr is >3    - likely pre-renal as patient has been stating he is vomiting when he eats. Also has been refusing to eat. Low suspicion for DIC in setting of endocarditis c/b septic emboli.  - avoid nephrotoxic agents     - check bladder scan ()  - holding torsemide    Problem: Need for prophylactic measure  Assessment and Plan: DVT PPx: Thrombocytopenia close to 50K, holding pharmcologic, SCDs for now  Diet: DASH (likely to need CC after A1C) PROBLEM DIAGNOSES  Problem: Endocarditis  Assessment and Plan: Recent, known hospitalization for endocarditis now c/b septic emboli. TTE confirms 3cm vegetation on bioprosthetic MV. BCx NGTD x2. s/p CARLY showing linear echo density on MV. Patient with persistent infection for > 7 days of abx therapy and persistent vegetation c/b emboli in spite of abx therapy so CT surgery on board. Per CT surg the patient has a hx of non-compliance which was why [place] did not perform surgery and CT surg is recommending medical management alone at this time.  -CTS recs  -Cards recs  -ID recs  - Vanc trough 28  -750mg vanc IV Q24H (vanc trough prior to 3rd dose)  -2g cefepime IV QD  -300mg rifampin IV TID    Problem: Septic embolism  Assessment and Plan: Patient w/t new AMS per brother and CTH finding of hypodensity in occipital lobe concerning for septic emboli causing stroke in the setting of endocarditis. Now c/b hemorrhagic conversion on MRI.   -Neuro recs: CT angio prior to CT surg   -Endocarditis management as above    Problem: Pancytopenia  Assessment and Plan: May be marrow suppression 2/2 sepsis. Will r/o viral eitiologies. Anemia possibly in setting of CKD. Possible platelets being used up from DIC in setting of endocarditis throwing emboli and renal dysfunction. Per outpatient record, not pancytopenic. Possibly all in setting of severe sepsis from endocarditis.  - Parvo -pending,   - CMV neg, EBV neg, HIV neg  -Hold AC for now as Plts are very close to 50K (per OP record, plts > 100)>   -Will obtain records from [facility]    Problem: Anemia  Assessment and Plan:     Problem: CHF (congestive heart failure)  Assessment and Plan: Acute on chronic HFrEF (20-25%, w/t severe LV dysfxn)   - attempt to get records from [facility]   - atorvastatin 40mg,   - coreg 12.5mg BID,   - Isordil 10mg TID,   - hydralazine 10mg TID,   - torsemide 20mg daily    Problem: Hypertension  Assessment and Plan:   - coreg 12.5mg BID,   - Isordil 10mg TID,   - hydralazine 10mg TID,   - torsemide 20mg daily    Problem: History of heart valve replacement  Assessment and Plan: -4mg (120 pills) of warfarin last dispensed 12/18. INR subtherapeutic. Unclear how long patient was off AC given recent hospitalization or if it was held 2/2 thrombocytopenia.   -records sent from home cardiologist.    Problem: Seizures  Assessment and Plan: - continue keppra     Problem: Acute-on-chronic kidney injury  Assessment and Plan: - patient admitted for JANEL a per rehab reports, discharge Cr was 2.5, currently 2.84. Closer to baseline.  - likely pre-renal as patient has been stating he is vomiting when he eats. Also has been refusing to eat. Low suspicion for DIC in setting of endocarditis c/b septic emboli.  - avoid nephrotoxic agents   -Renally dose vanc    Problem: Need for prophylactic measure  Assessment and Plan: DVT PPx: Thrombocytopenia close to 50K, holding pharmcologic, SCDs for now  Diet: DASH (likely to need CC after A1C) PROBLEM DIAGNOSES  Problem: Endocarditis  Assessment and Plan: Recent, known hospitalization for endocarditis now c/b septic emboli. TTE confirms 3cm vegetation on bioprosthetic MV. BCx NGTD x2. s/p CARLY showing linear echo density on MV. Patient with persistent infection for > 7 days of abx therapy and persistent vegetation c/b emboli in spite of abx therapy so CT surgery on board. Per CT surg the patient has a hx of non-compliance which was why [place] did not perform surgery and CT surg is recommending medical management alone at this time.  -CTS recs  -Cards recs  -ID recs  - Vanc trough 28, hold vancomycin and repeat random level in am  -if trough <20 will resume 750mg vanc IV Q24H (vanc trough prior to 3rd dose)  -2g cefepime IV QD  -300mg rifampin IV TID    Problem: Septic embolism  Assessment and Plan: Patient w/t new AMS per brother and CTH finding of hypodensity in occipital lobe concerning for septic emboli causing stroke in the setting of endocarditis. Now c/b hemorrhagic conversion on MRI.   -Neuro recs: CT angio prior to CT surg   -Endocarditis management as above    Problem: Pancytopenia  Assessment and Plan: May be marrow suppression 2/2 sepsis. Will r/o viral eitiologies. Anemia possibly in setting of CKD. Possible platelets being used up from DIC in setting of endocarditis throwing emboli and renal dysfunction. Per outpatient record, not pancytopenic. Possibly all in setting of severe sepsis from endocarditis.  - Parvo -pending,   - CMV neg, EBV neg, HIV neg  -Hold AC for now as Plts are very close to 50K (per OP record, plts > 100)>   -Will obtain records from [place]      Problem: CHF (congestive heart failure)  Assessment and Plan: Acute on chronic HFrEF (20-25%, w/t severe LV dysfxn)   - atorvastatin 40mg,   - coreg 12.5mg BID,   - Isordil 10mg TID,   - hydralazine 10mg TID,   - torsemide 20mg daily    Problem: Hypertension  Assessment and Plan:   - coreg 12.5mg BID,   - Isordil 10mg TID,   - hydralazine 10mg TID,   - torsemide 20mg daily    Problem: History of heart valve replacement  Assessment and Plan: -4mg (120 pills) of warfarin last dispensed 12/18. INR subtherapeutic. Unclear how long patient was off AC given recent hospitalization or if it was held 2/2 thrombocytopenia.   -records sent from home cardiologist.    Problem: Seizures  Assessment and Plan: - continue keppra     Problem: Acute-on-chronic kidney injury  Assessment and Plan: - patient admitted for JANEL a per rehab reports, discharge Cr was 2.5, currently 2.84. Closer to baseline.  - likely pre-renal as patient has been stating he is vomiting when he eats. Also has been refusing to eat. Low suspicion for DIC in setting of endocarditis c/b septic emboli.  - avoid nephrotoxic agents   -Renally dose vanc    Problem: Need for prophylactic measure  Assessment and Plan: DVT PPx: holding pharmcologic, SCDs for now  Diet: DASH (likely to need CC after A1C)

## 2019-03-20 NOTE — PROGRESS NOTE ADULT - SUBJECTIVE AND OBJECTIVE BOX
CC: Patient is a 63y old  Male who presents with a chief complaint of JANEL (20 Mar 2019 07:44)    ID following for endocarditis    Interval History/ROS: Patient has no new complaints today. Denies fever, chills.    Rest of ROS negative.    Allergies  penicillin (Swelling)    ANTIMICROBIALS:  cefepime   IVPB 2000 every 24 hours  rifampin IVPB 300 every 8 hours    OTHER MEDS:  atorvastatin 40 milliGRAM(s) Oral at bedtime  carvedilol 12.5 milliGRAM(s) Oral every 12 hours  hydrALAZINE 10 milliGRAM(s) Oral three times a day  isosorbide   dinitrate Tablet (ISORDIL) 10 milliGRAM(s) Oral three times a day  levETIRAcetam 500 milliGRAM(s) Oral two times a day  levothyroxine 200 MICROGram(s) Oral daily  senna 2 Tablet(s) Oral at bedtime  sodium bicarbonate 650 milliGRAM(s) Oral two times a day  sodium phosphate IVPB 15 milliMole(s) IV Intermittent once  tamsulosin 0.4 milliGRAM(s) Oral at bedtime  torsemide 20 milliGRAM(s) Oral daily    PE:    Vital Signs Last 24 Hrs  T(C): 36.8 (20 Mar 2019 12:07), Max: 36.9 (19 Mar 2019 19:31)  T(F): 98.2 (20 Mar 2019 12:07), Max: 98.5 (19 Mar 2019 20:41)  HR: 85 (20 Mar 2019 13:36) (61 - 85)  BP: 99/65 (20 Mar 2019 13:36) (99/65 - 130/81)  BP(mean): --  RR: 18 (20 Mar 2019 13:36) (18 - 18)  SpO2: 96% (20 Mar 2019 13:36) (96% - 100%)    Gen: AOx3, NAD  CV: S1+S2 normal, no murmurs  Resp: Clear bilat, no resp distress  Abd: Soft, nontender, +BS  Ext: No LE edema, no wounds  : No Griffith  IV/Skin: No thrombophlebitis, right arm PICC intact  Neuro: no focal deficits    LABS:                          9.8    6.95  )-----------( 93       ( 19 Mar 2019 12:40 )             31.9       03-20    141  |  102  |  26<H>  ----------------------------<  103<H>  4.1   |  25  |  2.84<H>    Ca    9.3      20 Mar 2019 11:38  Phos  2.4     03-20  Mg     2.0     03-20    MICROBIOLOGY:  Vancomycin Level, Trough: 28.0 ug/mL (03-20-19 @ 11:38)  v  .Urine Catheterized  03-18-19   No growth  --  --      .Blood Blood-Peripheral  03-15-19   No growth at 5 days.  --  --    CMV IgG Antibody: 0.27 U/mL (03-17-19 @ 12:53)    RADIOLOGY:    < from: Xray Chest 1 View- PORTABLE-Urgent (03.18.19 @ 21:39) >  FINDINGS/  IMPRESSION:    Tip of the right PICC within SVC. Mild left basilar atelectasis.    No consolidation, pleural effusion or pneumothorax. No pulmonary edema.    The cardiac silhouette remains enlarged. Sternotomy, aortic and mitral   valve replacement. Mitral appendage occlusion.    < end of copied text >    < from: Transesophageal Echocardiogram w/o TTE (03.18.19 @ 10:53) >  Conclusions:  1. Bioprosthetic mitral valve replacement. The valve  appears to be well seated. No sign of dehiscence. Thick  echodense layer of vegetation is seen coating the atrial  surface of the mitral valve leaflets. There is an about 3cm  linear, highly mobile, echodensity attached to the tip of  one of the leaflets and prolapses into the left ventricle.  Mild mitral regurgitation. No paravalvular regurgitation.  Mean transmitral valve gradient equals 4 mm Hg, which is  probably normal in the setting of a bioprosthetic mitral  valve replacement.  2. Bioprosthetic aortic valve in good position. No evidence  for endocarditis. Peak transaortic valve gradient equals 25  mm Hg, mean transaortic valve gradient equals 16 mm Hg,  which isprobably normal in the presence of a bioprosthetic  aortic valve. Mild aortic regurgitation.  3. Severe global left ventricular systolic dysfunction.  4. Right ventricular enlargement with decreased right  ventricular systolic function.    < end of copied text >

## 2019-03-20 NOTE — PROGRESS NOTE ADULT - ASSESSMENT
63 year old male with recent admission for endocarditis discharged on ceftriaxone and rifampin via right arm picc, hypothyroidism, ?nephrectomy, BPH, history of seizures, and HTN who presents to the hospital from rehab for suicidal ideations.     At NH, he told someone from the staff that he wanted to kill himself so he was brought to the ED  s/p 2 heart valve replacements (Aortic and mitral valve), last replacement was 2 year ago     While at [facility], found to have endocarditis on ceftriaxone and rifampin - was reported as culture negative endocarditis    Right arm picc   Now afebrile  TTE with prosthetic mitral valve with vegetation, has also prosthetic aortic valve  CT head/ MRI Head concerning for septic emboli, new areas of hemorrhage within the Right cerebellar hemisphere and right occipital lobe  Anemia, thrombocytopenia  Remains with JANEL  CMV serologies negative  EBV serologies consistent with prior infection  HIV negative  CARLY with mitral valve vegetation that prolapses into the left ventricle    Recommend:  -Continue with vancomycin and cefepime renally dosed  -Can hold vancomycin for now as supratherapeutic level - repeat in the AM and if <20, can restart vancomycin 750 mg IV q 24 hours.  -Continue rifampin  -Will need to obtain chart from [facility]  -CARLY  -Neurology input  -Cardiothoracic surgery input    For Culture negative endocarditis, would F/U:  -Quant TB Gold pending  -Bartonella serologies negative  -Q fever serologies negative  -Legionella ab negative  -Brucella serologies negative  -Chlamydia serologies negative  -Treponema pallidum ab negative  -HIV negative

## 2019-03-21 NOTE — PROGRESS NOTE ADULT - ASSESSMENT
62 y/o M w/t PMHx of bioprosthetic AV & MV c/b endocarditis (on ceftriaxone and rifampin), hypothyroidism, BPH, history of seizures, and HTN, who presents to the hospital from rehab for suicidal ideations found to have AMS, JANEL, fever, all 2/2 endocarditis with suspected hemorrhagic septic emboli by MR. TORREZ conversation w/t brother today. CT surg to discuss case w/t brother as well. 64 y/o M w/t PMHx of bioprosthetic AV & MV c/b endocarditis (on ceftriaxone and rifampin), hypothyroidism, BPH, history of seizures, and HTN, who presents to the hospital from rehab for suicidal ideations found to have AMS, JANEL, fever, all 2/2 endocarditis with suspected hemorrhagic septic emboli by MR. TORREZ conversation w/t brother held yesterday. Will continue with medical management for now as the patient refused doses of rifampin twice so cannot be ruled as treatment failure as of yet.

## 2019-03-21 NOTE — PROGRESS NOTE ADULT - PROBLEM SELECTOR PLAN 2
Recent hospitalization for endocarditis now c/b septic emboli. TTE confirms 3cm vegetation on bioprosthetic MV. BCx NGTD x2. s/p CARLY showing linear echo density on MV. Patient with persistent infection for > 7 days of abx therapy and persistent vegetation c/b emboli in spite of abx therapy so CT surgery on board. Per CT surg the patient has a hx of non-compliance which was why [facility] did not perform surgery and CT surg is recommending medical management alone at this time.  -CTS recs  -Cards recs (to sign off)  -ID recs  - Vanc trough 31, hold vancomycin and repeat random level in am)  -2g cefepime IV QD  -300mg rifampin IV TID  -Brucella, Q fever, chlamidophila, bartnella all neg prosthetic valve endocarditis   Recent hospitalization for endocarditis now c/b septic emboli. TTE confirms 3cm vegetation on bioprosthetic MV. BCx NGTD x2. s/p CARLY showing linear echo density on MV. Patient with persistent infection for > 7 days of abx therapy and persistent vegetation c/b emboli in spite of abx therapy so CT surgery on board.   -Per CT surg the patient has a hx of non-compliance which was why [facility] did not perform surgery and CT surg is recommending medical management alone at this time.  -CTS recs  -Cards recs (to sign off)  -ID recs  - Vanc trough 31, hold vancomycin and repeat random level in am)  -2g cefepime IV QD  -300mg rifampin IV TID  -Brucella, Q fever, chlamidophila, bartnella all neg

## 2019-03-21 NOTE — PROGRESS NOTE ADULT - PROBLEM SELECTOR PLAN 10
May be marrow suppression 2/2 sepsis.  Anemia possibly in setting of CKD. Past records showing normal cell cts acquired. Likely sepsis induced suppression.  - Parvo neg,CMV neg, EBV neg, HIV neg  -cbc qd    DVT PPx: SCDs  Diet: DASH/Renal  Dispo: pending abx plan

## 2019-03-21 NOTE — PROGRESS NOTE ADULT - ASSESSMENT
64 yo M with a PMH significant for prosthetic valve endocarditis(on ceftriaxone and rifampin), HFrEF?, hypothyroidism, BPH, history of seizures, and HTN who was sent to the hospital from rehab due to suicidal ideation. Cardiology consulted for endocarditis management given fever.    #Prosthetic valve endocarditis  Reported history of endocarditis with ceftriaxone and rifampin as outpatient. TTE with 3cm vegetation on mitral valve. MRI brain with evidence of likely embolization in right cerebellar hemisphere and right occipital lobes. CARLY with "Thick echodense layer of vegetation is seen coating the atrial surface of the mitral valve leaflets. There is an about 3cm linear, highly mobile, echodensity attached to the tip of one of the leaflets and prolapses into the left ventricle."  - CT surgery has been consulted, not a surgical candidate at this time, plan is outpatient IV Abx at rehab  - continue Abx per ID    #HFrEF  Not volume overloaded on exam. TTE with EF 20-25%.  - continue outpatient medications: atorvastatin 40mg, coreg 12.5mg BID, Isordil 10mg TID, hydralazine 10mg TID, torsemide 20mg daily    Cardiology will sign off at this time    Jorge Muñiz MD  Cardiology Fellow  [id number]

## 2019-03-21 NOTE — PROGRESS NOTE BEHAVIORAL HEALTH - SUMMARY
64y/o black male, domiciled in [place], with no formal psychiatric history, no SA, no substance abuse, with PMHx significant for endocarditis (on ceftriaxone and rifampin), hypothyroidism, BPH, history of seizures, and HTN who presents to the hospital from rehab for suicidal ideations. Per ED and nursing home documentation, the patient has been non compliant with his medications since coming to rehab. PTA to ED, patient told someone from the staff that he wanted to kill himself so he was brought to the ED. When seen, the patient repeatedly stated that he was upset that at [place] whenever he ate food, he would become nauseous and vomit. He believed there was a "bacteria contaminating the food" and had requested several people to address the issue but no one had done this.  He was frustrated by this and told the staff that he wanted to kill himself because he wanted to get out of the nursing home. When asked about his previous hospital course, he continues to talk about the "bacteria in the food" at [place].  Psychiatry consulted to assess for psychosis, delirium and suicidality.  Patient seen and evaluated, disoriented, states he is at [facility], not able to state the day, perseverates on how he believes his food was "being contaminated" at [place] and when he brought this to the attention of staff, reports that they neglected to look into this and made reports that he was suicidal.  Denies making any suicidal statements, denies any recent or current SI, denies A/V/H, however continues to perseverate on the contamination of his food at [place].  Denies feeling confused, denies memory impairments.  Reports good sleep, reports recently that his appetite has been poor.  Awaiting for call back from brother for collateral. 62y/o black male, domiciled in [place], with no formal psychiatric history, no SA, no substance abuse, with PMHx significant for endocarditis (on ceftriaxone and rifampin), hypothyroidism, BPH, history of seizures, and HTN who presents to the hospital from rehab for suicidal ideations. Per ED and nursing home documentation, the patient has been non compliant with his medications since coming to rehab. PTA to ED, patient told someone from the staff that he wanted to kill himself so he was brought to the ED. When seen, the patient repeatedly stated that he was upset that at [place] whenever he ate food, he would become nauseous and vomit. He believed there was a "bacteria contaminating the food" and had requested several people to address the issue but no one had done this.  He was frustrated by this and told the staff that he wanted to kill himself because he wanted to get out of the nursing home. When asked about his previous hospital course, he continues to talk about the "bacteria in the food" at [place].  Psychiatry consulted to assess for psychosis, delirium and suicidality.  Patient seen and evaluated, disoriented, grossly disorganized, laying naked on his bed, speaking incoherently.  Per team, patient agitated at times with staff, attempted to swing at nurse.  They report patient has also been laughing inappropriately to himself.  Per team, qtc today is 513ms, unable to use neuroleptics at this time, unable to use Depakote as well due to thrombocytopenia.  Recommend Ativan 1mg po/iv prn q6 for agitation.

## 2019-03-21 NOTE — PROGRESS NOTE ADULT - PROBLEM SELECTOR PLAN 4
AMS 2/2 septic emboli possibly c/b underlying depression per brother  -Psych consulted  -QTc > 500 so haldol being held

## 2019-03-21 NOTE — PROGRESS NOTE BEHAVIORAL HEALTH - NSBHCONSULTMEDS_PSY_A_CORE FT
melatonin 3mg po qhs  check baseline ekg, and if qtc <500ms on ekg start Haldol 0.5mg po qhs melatonin 3mg po qhs  d/c standing Haldol- unable to use neuroleptics due to prolonged qtc =513ms

## 2019-03-21 NOTE — PROGRESS NOTE BEHAVIORAL HEALTH - NSBHCHARTREVIEWLAB_PSY_A_CORE FT
10.1   5.71  )-----------( 91       ( 21 Mar 2019 09:25 )             33.9     03-21    139  |  100  |  26<H>  ----------------------------<  117<H>  3.6   |  23  |  2.88<H>    Ca    9.1      21 Mar 2019 06:39  Phos  3.7     03-21  Mg     1.9     03-21

## 2019-03-21 NOTE — PROGRESS NOTE BEHAVIORAL HEALTH - NSBHCHARTREVIEWVS_PSY_A_CORE FT
Vital Signs Last 24 Hrs  T(C): 36.8 (21 Mar 2019 12:18), Max: 37.1 (20 Mar 2019 17:51)  T(F): 98.3 (21 Mar 2019 12:18), Max: 98.8 (20 Mar 2019 17:51)  HR: 57 (21 Mar 2019 12:18) (57 - 85)  BP: 94/54 (21 Mar 2019 12:18) (94/54 - 116/86)  BP(mean): --  RR: 18 (21 Mar 2019 12:18) (18 - 18)  SpO2: 98% (21 Mar 2019 12:18) (96% - 98%)

## 2019-03-21 NOTE — PROGRESS NOTE ADULT - PROBLEM SELECTOR PLAN 1
AMS per brother and CTH finding of hypodensity in occipital lobe concerning for septic emboli causing stroke in the setting of endocarditis. Now c/b hemorrhagic conversion on MRI.   -Neuro recs: CT angio prior to any CT surg   -Endocarditis management as below  -Pending neuro recs for when to restart AC

## 2019-03-21 NOTE — PROGRESS NOTE BEHAVIORAL HEALTH - NSBHCONSULTRECOMMENDOTHER_PSY_A_CORE FT
1. Minimize use of benzos, opioids, anticholinergics, or other deliriogenic agents when possible.  Maintain sleep wake cycle.  Provide frequent reorientation and redirection.  Family member at bedside if possible. Assess for need for glasses and hearing aid (if applicable). 1. Recheck EKG in the AM   2. Minimize use of benzos, opioids, anticholinergics, or other deliriogenic agents when possible.  Maintain sleep wake cycle.  Provide frequent reorientation and redirection.  Family member at bedside if possible. Assess for need for glasses and hearing aid (if applicable).

## 2019-03-21 NOTE — PROGRESS NOTE ADULT - SUBJECTIVE AND OBJECTIVE BOX
Dr. Garret Sue   Internal Medicine PGY-1  Pager #[phone]    Patient is a 63y old  Male who presents with a chief complaint of JANEL (20 Mar 2019 17:12)      SUBJECTIVE / OVERNIGHT EVENTS:  ID held vanc dose and put in for random level this AM, if < 20 will restart. Family discussion with brother held. Determined not to be a candidate for surgery at this time.     MEDICATIONS  (STANDING):  atorvastatin 40 milliGRAM(s) Oral at bedtime  carvedilol 12.5 milliGRAM(s) Oral every 12 hours  cefepime   IVPB 2000 milliGRAM(s) IV Intermittent every 24 hours  hydrALAZINE 10 milliGRAM(s) Oral three times a day  isosorbide   dinitrate Tablet (ISORDIL) 10 milliGRAM(s) Oral three times a day  levETIRAcetam 500 milliGRAM(s) Oral two times a day  levothyroxine 200 MICROGram(s) Oral daily  rifampin IVPB 300 milliGRAM(s) IV Intermittent every 8 hours  senna 2 Tablet(s) Oral at bedtime  sodium bicarbonate 650 milliGRAM(s) Oral two times a day  tamsulosin 0.4 milliGRAM(s) Oral at bedtime  torsemide 20 milliGRAM(s) Oral daily    MEDICATIONS  (PRN):      Vital Signs Last 24 Hrs  T(C): 36.7 (21 Mar 2019 04:50), Max: 37.1 (20 Mar 2019 17:51)  T(F): 98 (21 Mar 2019 04:50), Max: 98.8 (20 Mar 2019 17:51)  HR: 62 (21 Mar 2019 04:50) (62 - 85)  BP: 104/71 (21 Mar 2019 04:50) (99/65 - 124/78)  BP(mean): --  RR: 18 (21 Mar 2019 04:50) (18 - 18)  SpO2: 98% (21 Mar 2019 04:50) (96% - 100%)  CAPILLARY BLOOD GLUCOSE        I&O's Summary    20 Mar 2019 07:01  -  21 Mar 2019 07:00  --------------------------------------------------------  IN: 830 mL / OUT: 100 mL / NET: 730 mL        PHYSICAL EXAM:  GENERAL: NAD, well-developed  HEAD:  Atraumatic, Normocephalic  EYES: EOMI, PERRLA, conjunctiva and sclera clear  NECK: Supple, No JVD  CHEST/LUNG: Clear to auscultation bilaterally; No wheeze  HEART: Regular rate and rhythm; No murmurs, rubs, or gallops  ABDOMEN: Soft, Nontender, Nondistended; Bowel sounds present  EXTREMITIES:  2+ Peripheral Pulses, No clubbing, cyanosis, or edema  PSYCH: AAOx3  NEUROLOGY: non-focal  SKIN: No rashes or lesions    LABS:                        10.8   6.24  )-----------( 94       ( 20 Mar 2019 17:36 )             35.1     03-20    141  |  102  |  26<H>  ----------------------------<  103<H>  4.1   |  25  |  2.84<H>    Ca    9.3      20 Mar 2019 11:38  Phos  2.4     03-20  Mg     2.0     03-20      PT/INR - ( 19 Mar 2019 12:28 )   PT: 14.4 sec;   INR: 1.25 ratio         PTT - ( 19 Mar 2019 12:28 )  PTT:43.0 sec          RADIOLOGY & ADDITIONAL TESTS:    Imaging Personally Reviewed:    Consultant(s) Notes Reviewed:      Care Discussed with Consultants/Other Providers: Dr. Garret Sue   Internal Medicine PGY-1  Pager #[phone]    Patient is a 63y old  Male who presents with a chief complaint of JANEL (20 Mar 2019 17:12)      SUBJECTIVE / OVERNIGHT EVENTS:  ID held vanc dose and put in for random level this AM, if < 20 will restart. Family discussion with brother held. Determined not to be a candidate for surgery at this time. Inappropriate laughter given conversation again this AM. Was reportedly agitated and aggressive with nurses and medical student. Oriented to self and year. Denies CP, SOB, HA, vision changes, and focal neuro deficits.     MEDICATIONS  (STANDING):  atorvastatin 40 milliGRAM(s) Oral at bedtime  carvedilol 12.5 milliGRAM(s) Oral every 12 hours  cefepime   IVPB 2000 milliGRAM(s) IV Intermittent every 24 hours  hydrALAZINE 10 milliGRAM(s) Oral three times a day  isosorbide   dinitrate Tablet (ISORDIL) 10 milliGRAM(s) Oral three times a day  levETIRAcetam 500 milliGRAM(s) Oral two times a day  levothyroxine 200 MICROGram(s) Oral daily  rifampin IVPB 300 milliGRAM(s) IV Intermittent every 8 hours  senna 2 Tablet(s) Oral at bedtime  sodium bicarbonate 650 milliGRAM(s) Oral two times a day  tamsulosin 0.4 milliGRAM(s) Oral at bedtime  torsemide 20 milliGRAM(s) Oral daily    MEDICATIONS  (PRN):      Vital Signs Last 24 Hrs  T(C): 36.7 (21 Mar 2019 04:50), Max: 37.1 (20 Mar 2019 17:51)  T(F): 98 (21 Mar 2019 04:50), Max: 98.8 (20 Mar 2019 17:51)  HR: 62 (21 Mar 2019 04:50) (62 - 85)  BP: 104/71 (21 Mar 2019 04:50) (99/65 - 124/78)  BP(mean): --  RR: 18 (21 Mar 2019 04:50) (18 - 18)  SpO2: 98% (21 Mar 2019 04:50) (96% - 100%)  CAPILLARY BLOOD GLUCOSE        I&O's Summary    20 Mar 2019 07:01  -  21 Mar 2019 07:00  --------------------------------------------------------  IN: 830 mL / OUT: 100 mL / NET: 730 mL        PHYSICAL EXAM:  GENERAL: AAOx2, laughing inappropriately given conversation.   HEAD:  Atraumatic, Normocephalic  EYES: EOMI, PERRLA, conjunctiva and sclera clear  NECK: Supple, No JVD  CHEST/LUNG: Clear to auscultation bilaterally; No wheeze  HEART: Regular rate and rhythm; No murmurs, rubs, or gallops  ABDOMEN: Soft, Nontender, Nondistended; Bowel sounds present  EXTREMITIES:  2+ Peripheral Pulses, No clubbing, cyanosis, or edema  PSYCH: AAOx2, inappropriate affect.   NEUROLOGY: non-focal  SKIN: No rashes or lesions    LABS:                        10.8   6.24  )-----------( 94       ( 20 Mar 2019 17:36 )             35.1     03-20    141  |  102  |  26<H>  ----------------------------<  103<H>  4.1   |  25  |  2.84<H>    Ca    9.3      20 Mar 2019 11:38  Phos  2.4     03-20  Mg     2.0     03-20      PT/INR - ( 19 Mar 2019 12:28 )   PT: 14.4 sec;   INR: 1.25 ratio         PTT - ( 19 Mar 2019 12:28 )  PTT:43.0 sec          RADIOLOGY & ADDITIONAL TESTS:    Imaging Personally Reviewed:    Consultant(s) Notes Reviewed:      Care Discussed with Consultants/Other Providers:

## 2019-03-21 NOTE — PROGRESS NOTE ADULT - SUBJECTIVE AND OBJECTIVE BOX
CC: Patient is a 63y old  Male who presents with a chief complaint of JANEL (21 Mar 2019 07:24)    ID following for endocarditis    Interval History/ROS: Patient more confused today. Has no new complaints. No fevers, no chills.    Rest of ROS negative.    Allergies  penicillin (Swelling)    ANTIMICROBIALS:  cefepime   IVPB 2000 every 24 hours  rifampin IVPB 300 every 8 hours    OTHER MEDS:  atorvastatin 40 milliGRAM(s) Oral at bedtime  carvedilol 12.5 milliGRAM(s) Oral every 12 hours  hydrALAZINE 10 milliGRAM(s) Oral three times a day  isosorbide   dinitrate Tablet (ISORDIL) 10 milliGRAM(s) Oral three times a day  levETIRAcetam 500 milliGRAM(s) Oral two times a day  levothyroxine 200 MICROGram(s) Oral daily  senna 2 Tablet(s) Oral at bedtime  sodium bicarbonate 650 milliGRAM(s) Oral two times a day  tamsulosin 0.4 milliGRAM(s) Oral at bedtime  torsemide 20 milliGRAM(s) Oral daily    PE:    Vital Signs Last 24 Hrs  T(C): 36.8 (21 Mar 2019 12:18), Max: 37.1 (20 Mar 2019 17:51)  T(F): 98.3 (21 Mar 2019 12:18), Max: 98.8 (20 Mar 2019 17:51)  HR: 57 (21 Mar 2019 12:18) (57 - 85)  BP: 94/54 (21 Mar 2019 12:18) (94/54 - 116/86)  BP(mean): --  RR: 18 (21 Mar 2019 12:18) (18 - 18)  SpO2: 98% (21 Mar 2019 12:18) (96% - 98%)    Gen: Confused, NAD  CV: S1+S2 normal, no murmurs  Resp: Clear bilat, no resp distress  Abd: Soft, nontender, +BS  Ext: No LE edema, no wounds  : No Griffith  IV/Skin: No thrombophlebitis  Neuro: no focal deficits    LABS:                          10.1   5.71  )-----------( 91       ( 21 Mar 2019 09:25 )             33.9       03-21    139  |  100  |  26<H>  ----------------------------<  117<H>  3.6   |  23  |  2.88<H>    Ca    9.1      21 Mar 2019 06:39  Phos  3.7     03-21  Mg     1.9     03-21    MICROBIOLOGY:  Vancomycin Level, Random: 31.8 ug/mL (03-21-19 @ 06:39)  v  .Urine Catheterized  03-18-19   No growth  --  --      .Blood Blood-Peripheral  03-15-19   No growth at 5 days.  --  --    CMV IgG Antibody: 0.27 U/mL (03-17-19 @ 12:53)    RADIOLOGY:    < from: Xray Chest 1 View- PORTABLE-Urgent (03.18.19 @ 21:39) >  FINDINGS/  IMPRESSION:    Tip of the right PICC within SVC. Mild left basilar atelectasis.    No consolidation, pleural effusion or pneumothorax. No pulmonary edema.    The cardiac silhouette remains enlarged. Sternotomy, aortic and mitral   valve replacement. Mitral appendage occlusion.    < end of copied text >

## 2019-03-21 NOTE — PROGRESS NOTE BEHAVIORAL HEALTH - NSBHFUPINTERVALHXFT_PSY_A_CORE
Patient seen and evaluated, awake, closing his eyes, irritable, oriented to person, disoriented to place (states "I do not know, states he in currently in a large building") disoriented to time, reports the current [place] President is Obama.  Patient reports he does not know what his current medical condition is and what treatments he has been getting here in the hospital.  Denies SI, plan or intent, when asked about his previous statements to staff about wanting to die, adamantly denies.  Patient denies A/V/H or thoughts of paranoia.  Patient reports "food isn't that great here", when asked if he feels like his food is being contaminated answers to writer "I do not know", when asked about his thoughts being food being contaminated at his previous facility states "I do not know."  Reports sleep is good.  Per team, patient more agitated, at intermittently refusing his IV Abx, and endorsing passive SI. Patient seen and evaluated, awake, closing his eyes, irritable, oriented to person, disoriented to place (states "I do not know, states he in currently in a large building") disoriented to time, reports the current [place] President is Obama.  Patient reports he does not know what his current medical condition is and what treatments he has been getting here in the hospital.  Denies SI, plan or intent, when asked about his previous statements to staff about wanting to die, adamantly denies.  Patient denies A/V/H or thoughts of paranoia.  Patient reports "food isn't that great here", when asked if he feels like his food is being contaminated answers to writer "I do not know", when asked about his thoughts being food being contaminated at his previous facility states "I do not know."  Reports sleep is good.  Per team, patient more agitated, attempted to swing at the nurse this morning, laughing inappropriately to himself, at intermittently refusing his IV Abx, and endorsing passive SI.

## 2019-03-21 NOTE — PROGRESS NOTE ADULT - PROBLEM SELECTOR PLAN 3
S/p bioprosthetic aortic and mitral valve replacements.  -Cardiologist Dr. Lachman  -Recs for starting AC pending neuro recs  -Endocarditis management as above

## 2019-03-21 NOTE — PROGRESS NOTE ADULT - ASSESSMENT
63 year old male with recent admission for endocarditis discharged on ceftriaxone and rifampin via right arm picc, hypothyroidism, ?nephrectomy, BPH, history of seizures, and HTN who presents to the hospital from rehab for suicidal ideations.     At NH, he told someone from the staff that he wanted to kill himself so he was brought to the ED  s/p 2 heart valve replacements (Aortic and mitral valve), last replacement was 2 year ago     While at [facility], found to have endocarditis on ceftriaxone and rifampin - was reported as culture negative endocarditis    Right arm picc   Now afebrile  TTE with prosthetic mitral valve with vegetation, has also prosthetic aortic valve  CT head/ MRI Head concerning for septic emboli, new areas of hemorrhage within the Right cerebellar hemisphere and right occipital lobe  Anemia, thrombocytopenia  Remains with JANEL  CMV serologies negative  EBV serologies consistent with prior infection  HIV negative  CARLY with mitral valve vegetation that prolapses into the left ventricle  Vanco level supratherapeutic    Recommend:  -Continue with vancomycin and cefepime renally dosed  -Can hold vancomycin for now as supratherapeutic level - repeat in the AM and if <20, can restart vancomycin 750 mg IV q 24 hours.  -Continue rifampin  -Will need to obtain chart from [facility]  -Cardiothoracic surgery input    For Culture negative endocarditis, would F/U:  -Quant TB Gold pending  -Bartonella serologies negative  -Q fever serologies negative  -Legionella ab negative  -Brucella serologies negative  -Chlamydia serologies negative  -Treponema pallidum ab negative  -HIV negative 63 year old male with recent admission for endocarditis discharged on ceftriaxone and rifampin via right arm picc, hypothyroidism, ?nephrectomy, BPH, history of seizures, and HTN who presents to the hospital from rehab for suicidal ideations.     At NH, he told someone from the staff that he wanted to kill himself so he was brought to the ED  s/p 2 heart valve replacements (Aortic and mitral valve), last replacement was 2 year ago     While at [facility], found to have endocarditis on ceftriaxone and rifampin - was reported as culture negative endocarditis    Right arm picc   Now afebrile  TTE with prosthetic mitral valve with vegetation, has also prosthetic aortic valve  CT head/ MRI Head concerning for septic emboli, new areas of hemorrhage within the Right cerebellar hemisphere and right occipital lobe  Anemia, thrombocytopenia  Remains with JANEL  CMV serologies negative  EBV serologies consistent with prior infection  HIV negative  CARLY with mitral valve vegetation that prolapses into the left ventricle  Vanco level supratherapeutic  ?Mycobacterium chimera    Recommend:  -Continue with vancomycin and cefepime renally dosed  -Can hold vancomycin for now as supratherapeutic level - repeat in the AM and if <20, can restart vancomycin 750 mg IV q 24 hours.  -Continue rifampin  -Will need to obtain chart from [facility]  -Cardiothoracic surgery input  -Check AFB blood cultures    For Culture negative endocarditis, would F/U:  -Quant TB Gold pending  -Bartonella serologies negative  -Q fever serologies negative  -Legionella ab negative  -Brucella serologies negative  -Chlamydia serologies negative  -Treponema pallidum ab negative  -HIV negative 63 year old male with recent admission for endocarditis discharged on ceftriaxone and rifampin via right arm picc, hypothyroidism, ?nephrectomy, BPH, history of seizures, and HTN who presents to the hospital from rehab for suicidal ideations.     At NH, he told someone from the staff that he wanted to kill himself so he was brought to the ED  s/p 2 heart valve replacements (Aortic and mitral valve), last replacement was 2 year ago     While at [place], found to have endocarditis on ceftriaxone and rifampin - was reported as culture negative endocarditis    Right arm picc   Now afebrile  TTE with prosthetic mitral valve with vegetation, has also prosthetic aortic valve  CT head/ MRI Head concerning for septic emboli, new areas of hemorrhage within the Right cerebellar hemisphere and right occipital lobe  Anemia, thrombocytopenia  Remains with JANEL  CMV serologies negative  EBV serologies consistent with prior infection  HIV negative  CARLY with mitral valve vegetation that prolapses into the left ventricle  Vanco level supratherapeutic  ?Mycobacterium chimera    Recommend:  -Continue with vancomycin and cefepime renally dosed  -Can hold vancomycin for now as supratherapeutic level - repeat in the AM and if <20, can restart vancomycin 750 mg IV q 24 hours.  -Discontinue rifampin given worsening janel  -Will need to obtain chart from [place]  -Cardiothoracic surgery input  -Check AFB blood cultures    For Culture negative endocarditis, would F/U:  -Quant TB Gold pending  -Bartonella serologies negative  -Q fever serologies negative  -Legionella ab negative  -Brucella serologies negative  -Chlamydia serologies negative  -Treponema pallidum ab negative  -HIV negative

## 2019-03-21 NOTE — PROGRESS NOTE BEHAVIORAL HEALTH - NSBHCHARTREVIEWIMAGING_PSY_A_CORE FT
< from: MR Head No Cont (03.17.19 @ 13:14) >    EXAM:  MR BRAIN                          EXAM:  MR ANGIO NECK                          EXAM:  MR ANGIO BRAIN                          PROCEDURE DATE:  03/17/2019      COMPARISON: Head CT dated 3/15/2019      IMPRESSION:    Multiple focal areas of restricted diffusion suggesting new areas of   hemorrhage within the right cerebellar hemisphere and within the right   occipital lobe, with hemorrhagic transformation in the right occipital   lobe.    Tortuous extracranial vessels likely reflecting hypertension with   stenoses of the distal middle and posterior cerebral artery branch   vasculature. No  ICA origin hemodynamically significant stenosis by   NASCET criteria.    Findings discussed with Dr. Villalobos at immediate time of review on   3/17/2019 at 1:19 PM.    < end of copied text >

## 2019-03-21 NOTE — PROGRESS NOTE BEHAVIORAL HEALTH - NSBHCONSULTMEDAGITATION_PSY_A_CORE FT
Haldol 0.5mg po/iv q6 prn for agitation (monitor qtc <500ms on ekg) Ativan 1mg po/iv prn q6 prn for agitation

## 2019-03-21 NOTE — PROGRESS NOTE ADULT - SUBJECTIVE AND OBJECTIVE BOX
Referring: Cleopatra    Overnight Events: AKSHAT, team meeting with surgery and primary team 3/20 and patient deemed not a current candidate for surgery.    ROS: Specifically denies chest pain and SOB however did not answer other questions, said he "feels good today."    Current Meds:  atorvastatin 40 milliGRAM(s) Oral at bedtime  carvedilol 12.5 milliGRAM(s) Oral every 12 hours  cefepime   IVPB 2000 milliGRAM(s) IV Intermittent every 24 hours  hydrALAZINE 10 milliGRAM(s) Oral three times a day  isosorbide   dinitrate Tablet (ISORDIL) 10 milliGRAM(s) Oral three times a day  levETIRAcetam 500 milliGRAM(s) Oral two times a day  levothyroxine 200 MICROGram(s) Oral daily  rifampin IVPB 300 milliGRAM(s) IV Intermittent every 8 hours  senna 2 Tablet(s) Oral at bedtime  sodium bicarbonate 650 milliGRAM(s) Oral two times a day  tamsulosin 0.4 milliGRAM(s) Oral at bedtime  torsemide 20 milliGRAM(s) Oral daily      Vitals:  T(F): 98 (03-21), Max: 98.8 (03-20)  HR: 62 (03-21) (62 - 85)  BP: 104/71 (03-21) (99/65 - 124/78)  RR: 18 (03-21)  SpO2: 98% (03-21)  I&O's Summary    20 Mar 2019 07:01  -  21 Mar 2019 07:00  --------------------------------------------------------  IN: 830 mL / OUT: 100 mL / NET: 730 mL      Physical Exam:  GENERAL: No acute distress, nude  HEAD:  Atraumatic, Normocephalic  ENT:Neck supple, No JVD  CHEST/LUNG: Clear to auscultation bilaterally anteriorly; No wheeze, equal breath sounds bilaterally   HEART: Regular rate and rhythm; No murmurs, rubs, or gallops  ABDOMEN: Soft, Nontender, Nondistended; Bowel sounds present  EXTREMITIES:  No clubbing, cyanosis, or edema  PSYCH: strange affect  NEUROLOGY: AAOx3, non-focal, cranial nerves intact  SKIN: Normal color, No rashes or lesions                                        10.8   6.24  )-----------( 94       ( 20 Mar 2019 17:36 )             35.1     03-20    141  |  102  |  26<H>  ----------------------------<  103<H>  4.1   |  25  |  2.84<H>    Ca    9.3      20 Mar 2019 11:38  Phos  2.4     03-20  Mg     2.0     03-20      PT/INR - ( 19 Mar 2019 12:28 )   PT: 14.4 sec;   INR: 1.25 ratio         PTT - ( 19 Mar 2019 12:28 )  PTT:43.0 sec  CARDIAC MARKERS ( 16 Mar 2019 05:25 )  464 ng/L / x     / x     / x     / x     / x      CARDIAC MARKERS ( 16 Mar 2019 05:24 )  x     / x     / x     / 109 U/L / x     / x      CARDIAC MARKERS ( 15 Mar 2019 21:13 )  488 ng/L / x     / x     / x     / x     / x      CARDIAC MARKERS ( 15 Mar 2019 17:25 )  502 ng/L / x     / x     / x     / x     / x          Echo:  3/18/19 CARLY  Conclusions:  1. Bioprosthetic mitral valve replacement. The valve  appears to be well seated. No sign of dehiscence. Thick  echodense layer of vegetation is seen coating the atrial  surface of the mitral valve leaflets. There is an about 3cm  linear, highly mobile, echodensity attached to the tip of  one of the leaflets and prolapses into the left ventricle.  Mild mitral regurgitation. No paravalvular regurgitation.  Mean transmitral valve gradient equals 4 mm Hg, which is  probably normal in the setting of a bioprosthetic mitral  valve replacement.  2. Bioprosthetic aortic valve in good position. No evidence  for endocarditis. Peak transaortic valve gradient equals 25  mm Hg, mean transaortic valve gradient equals 16 mm Hg,  which is probably normal in the presence of a bioprosthetic  aortic valve. Mild aortic regurgitation.  3. Severe global left ventricular systolic dysfunction.  4. Right ventricular enlargement with decreased right  ventricular systolic function.      3/15/19  EF 20-25%  Conclusions:  1. Bioprosthetic mitral valve replacement. There is a long  about 3 cm echogenic mass on the mitral valve strongly  suggestive of vegetation. Correlate clinically.  Mild  mitral regurgitation.  Mean transmitral valve gradient  equals 8 mm Hg, which is elevated even in the setting of a  bioprosthetic mitral valve replacement.  2. Bioprosthetic aortic valve. Peak transaortic valve  gradient equals 29 mm Hg, mean transaortic valve gradient  equals 12 mm Hg, which is probably normal in the presence  of a bioprosthetic aortic valve. However, he has  significant PVCs which may demonstrate elevated gradients  that are not representative of his true gradients. Suggest  reassessment of gradients across both the AV and MV once  optimized.  3. Severely dilated right and left atrium.  4. Eccentric left ventricular hypertrophy (dilated left  ventricle with normal relative wall thickness).  5. Severe global left ventricular systolic dysfunction with  regional wall motion abnormaltiies.  6. Increased E/e'  is consistent with elevated left  ventricular filling pressure.  7. Normal right ventricular size with decreased right  ventricular systolic function.  8. There is evidence of mitral valve endocarditis (known  from prior and currently on antibiotics).  *** No previous Echo exam.    Stress Testing:     Imaging:  MRI brain:  IMPRESSION:    Multiple focal areas of restricted diffusion suggesting new areas of   hemorrhage within the right cerebellar hemisphere and within the right   occipital lobe, with hemorrhagic transformation in the right occipital   lobe.    Tortuous extracranial vessels likely reflecting hypertension with   stenoses of the distal middle and posterior cerebral artery branch   vasculature. No  ICA origin hemodynamically significant stenosis by   NASCET criteria.    Interpretation of Telemetry: sinus 70s-90s

## 2019-03-22 NOTE — PROGRESS NOTE BEHAVIORAL HEALTH - NSBHCHARTREVIEWLAB_PSY_A_CORE FT
9.7    5.4   )-----------( 87       ( 22 Mar 2019 07:16 )             30.3     03-22    142  |  103  |  24<H>  ----------------------------<  113<H>  3.9   |  24  |  3.03<H>    Ca    9.4      22 Mar 2019 07:15  Phos  3.9     03-22  Mg     2.5     03-22

## 2019-03-22 NOTE — PROGRESS NOTE BEHAVIORAL HEALTH - NSBHCONSULTFOLLOWAFTERCARE_PSY_A_CORE FT
pt can f/u at [facility] once medically stable
pt can f/u at [facility] once medically stable

## 2019-03-22 NOTE — PROGRESS NOTE ADULT - PROBLEM SELECTOR PLAN 6
- coreg 12.5mg BID,   - Isordil 10mg TID,   - hydralazine 10mg TID,   - torsemide 20mg daily H/O seizures  -c/w keppra

## 2019-03-22 NOTE — PROGRESS NOTE ADULT - SUBJECTIVE AND OBJECTIVE BOX
CC: Patient is a 63y old  Male who presents with a chief complaint of JANEL (22 Mar 2019 06:56)    ID following for endocarditis    Interval History/ROS: Patient agitated today. Upset being asked the same questions daily. Otherwise, he has no other complaints. Denies fever, chills.    Rest of ROS negative.    Allergies  penicillin (Swelling)    ANTIMICROBIALS:  cefepime   IVPB 2000 every 24 hours    OTHER MEDS:  atorvastatin 40 milliGRAM(s) Oral at bedtime  carvedilol 12.5 milliGRAM(s) Oral every 12 hours  chlorhexidine 4% Liquid 1 Application(s) Topical every 12 hours  hydrALAZINE 10 milliGRAM(s) Oral three times a day  isosorbide   dinitrate Tablet (ISORDIL) 10 milliGRAM(s) Oral three times a day  lactated ringers. 1000 milliLiter(s) IV Continuous <Continuous>  levETIRAcetam 500 milliGRAM(s) Oral two times a day  levothyroxine 200 MICROGram(s) Oral daily  LORazepam   Injectable 1 milliGRAM(s) IntraMuscular every 6 hours PRN  senna 2 Tablet(s) Oral at bedtime  sodium bicarbonate 650 milliGRAM(s) Oral two times a day  tamsulosin 0.4 milliGRAM(s) Oral at bedtime    PE:    Vital Signs Last 24 Hrs  T(C): 36.6 (22 Mar 2019 12:12), Max: 36.8 (22 Mar 2019 04:53)  T(F): 97.9 (22 Mar 2019 12:12), Max: 98.2 (22 Mar 2019 04:53)  HR: 77 (22 Mar 2019 13:39) (40 - 82)  BP: 131/83 (22 Mar 2019 13:39) (81/59 - 131/83)  BP(mean): --  RR: 18 (22 Mar 2019 12:12) (18 - 18)  SpO2: 98% (22 Mar 2019 12:12) (97% - 98%)    Gen: Awake, alert, NAD  CV: S1+S2 normal, no murmurs  Resp: Clear bilat, no resp distress  Abd: Soft, nontender, +BS  Ext: No LE edema, no wounds  : No Griffith  IV/Skin: No thrombophlebitis, right arm picc intact  Neuro: no focal deficits    LABS:                          9.7    5.4   )-----------( 87       ( 22 Mar 2019 07:16 )             30.3       03-22    142  |  103  |  24<H>  ----------------------------<  113<H>  3.9   |  24  |  3.03<H>    Ca    9.4      22 Mar 2019 07:15  Phos  3.9     03-22  Mg     2.5     03-22    MICROBIOLOGY:  Vancomycin Level, Random: 26.5 ug/mL (03-22-19 @ 07:16)  v  .Urine Catheterized  03-18-19   No growth  --  --    .Blood Blood-Peripheral  03-15-19   No growth at 5 days.  --  --    CMV IgG Antibody: 0.27 U/mL (03-17-19 @ 12:53)    RADIOLOGY:  < from: MR Head No Cont (03.17.19 @ 13:14) >  IMPRESSION:    Multiple focal areas of restricted diffusion suggesting new areas of   hemorrhage within the right cerebellar hemisphere and within the right   occipital lobe, with hemorrhagic transformation in the right occipital   lobe.    Tortuous extracranial vessels likely reflecting hypertension with   stenoses of the distal middle and posterior cerebral artery branch   vasculature. No  ICA origin hemodynamically significant stenosis by   NASCET criteria.    < end of copied text >

## 2019-03-22 NOTE — PROGRESS NOTE BEHAVIORAL HEALTH - SUMMARY
62y/o black male, domiciled in [place], with no formal psychiatric history, no SA, no substance abuse, with PMHx significant for endocarditis (on ceftriaxone and rifampin), hypothyroidism, BPH, history of seizures, and HTN who presents to the hospital from rehab for suicidal ideations. Per ED and nursing home documentation, the patient has been non compliant with his medications since coming to rehab. PTA to ED, patient told someone from the staff that he wanted to kill himself so he was brought to the ED. When seen, the patient repeatedly stated that he was upset that at [place] whenever he ate food, he would become nauseous and vomit. He believed there was a "bacteria contaminating the food" and had requested several people to address the issue but no one had done this.  He was frustrated by this and told the staff that he wanted to kill himself because he wanted to get out of the nursing home. When asked about his previous hospital course, he continues to talk about the "bacteria in the food" at [place].  Psychiatry consulted to assess for psychosis, delirium and suicidality.  Patient seen and evaluated, disoriented, grossly disorganized, laying naked on his bed, speaking incoherently.  Per team, patient agitated at times with staff, attempted to swing at nurse.  They report patient has also been laughing inappropriately to himself.  Per team, qtc today is 513ms, unable to use neuroleptics at this time, unable to use Depakote as well due to thrombocytopenia.  Recommend Ativan 1mg po/iv prn q6 for agitation.

## 2019-03-22 NOTE — PROGRESS NOTE ADULT - PROBLEM SELECTOR PLAN 7
Baseline sCr was 2.7 in December, close to baseline now  -Renally dose meds  -avoid nephrotoxins Hold hypotension meds  - coreg 12.5mg BID,   - Isordil 10mg TID,   - hydralazine 10mg TID,   -hold torsemide 20mg daily

## 2019-03-22 NOTE — PROGRESS NOTE BEHAVIORAL HEALTH - NSBHCONSULTRECOMMENDOTHER_PSY_A_CORE FT
1. Recheck EKG in the AM   2. Minimize use of benzos, opioids, anticholinergics, or other deliriogenic agents when possible.  Maintain sleep wake cycle.  Provide frequent reorientation and redirection.  Family member at bedside if possible. Assess for need for glasses and hearing aid (if applicable).

## 2019-03-22 NOTE — PROGRESS NOTE ADULT - PROBLEM SELECTOR PLAN 1
AMS per brother and CTH finding of hypodensity in occipital lobe concerning for septic emboli causing stroke in the setting of endocarditis. Now c/b hemorrhagic conversion on MRI.   -Neuro recs: CT angio prior to any CT surg   -Endocarditis management as below Hypotension likely in setting hypovolemia 2/2 poor po intake.  -improved s/p 500cc bolus of LR  -[place] lower than admission  -EKG unchanged from  -hold torsemide  -gentle hydration w/t 30cc/hr LR x24hrs Hypotension likely in setting hypovolemia 2/2 poor po intake.  -improved s/p 500cc bolus of LR  -[place] lower than admission  -EKG unchanged from prior  -hold torsemide  -gentle hydration w/t 30cc/hr LR x24hrs Hypotension likely in setting hypovolemia 2/2 diuresis & poor po intake.  -improved s/p 500cc bolus of LR  -[place] lower than admission  -EKG unchanged from prior  -hold torsemide  -gentle hydration w/t 30cc/hr LR x24hrs

## 2019-03-22 NOTE — PROVIDER CONTACT NOTE (CRITICAL VALUE NOTIFICATION) - ACTION/TREATMENT ORDERED:
MD Sue notified. Vanco dosage adjusted.
continue to monitor
continue to monitor vanco daily
cardiology consult, pt tx to [facility].

## 2019-03-22 NOTE — PROVIDER CONTACT NOTE (CRITICAL VALUE NOTIFICATION) - BACKGROUND
64 yo M admitted for endocarditis with septic emboli, altered mental status, JANEL and pancytopenia.
pt admitted with vegetation on MV, endocarditis
pt with acute renal failure, vegetation on MV.
Pt admitted for behavioral changes from nursing home. dx arf

## 2019-03-22 NOTE — PROGRESS NOTE ADULT - PROBLEM SELECTOR PLAN 3
S/p bioprosthetic aortic and mitral valve replacements.  -Cardiologist Dr. Lachman  -Endocarditis management as above Bioprosthetic MV endocarditis   Recent hospitalization for endocarditis now c/b septic emboli. TTE confirms 3cm vegetation on bioprosthetic MV. BCx NGTD x2. s/p CARLY showing linear echo density on MV.   -Patient with persistent infection for > 7 days of abx therapy and persistent vegetation c/b emboli in spite of abx therapy so CT surgery on board.   -Per CT surg the patient has a hx of non-compliance which was why Radha did not perform surgery and CT surg is recommending medical management.  -CTS recs  -ID recs  - Vanc trough 26, hold vancomycin and repeat random level in am)  -2g cefepime IV QD  -hold rifampin 2/2 worsening renal fxn  -Brucella, Q fever, chlamidophila, bartnella all neg

## 2019-03-22 NOTE — PROGRESS NOTE BEHAVIORAL HEALTH - NSBHCHARTREVIEWINVESTIGATE_PSY_A_CORE FT
ekg in chart from 3/22- qtc 515ms, 519ms
< from: 12 Lead ECG (03.15.19 @ 15:52) >      Ventricular Rate 83 BPM    Atrial Rate 394 BPM    QRS Duration 86 ms    Q-T Interval 422 ms    QTC Calculation(Bezet) 495 ms    P Axis 106 degrees    R Axis 47 degrees    T Axis -15 degrees    Diagnosis Line ATRIAL FLUTTER WITH VARIABLE A-V BLOCK WITH PREMATURE VENTRICULAR OR ABERRANTLY CONDUCTED COMPLEXES  ST & T WAVE ABNORMALITY, CONSIDER LATERAL ISCHEMIA  PROLONGED QT  ABNORMAL ECG    Confirmed by ATTENDING, ED ([id number]),  Susan Cagle ([id number]) on 3/16/2019 6:39:39 AM    < end of copied text >

## 2019-03-22 NOTE — PROGRESS NOTE ADULT - PROBLEM SELECTOR PLAN 2
prosthetic valve endocarditis   Recent hospitalization for endocarditis now c/b septic emboli. TTE confirms 3cm vegetation on bioprosthetic MV. BCx NGTD x2. s/p CARLY showing linear echo density on MV. Patient with persistent infection for > 7 days of abx therapy and persistent vegetation c/b emboli in spite of abx therapy so CT surgery on board.   -Per CT surg the patient has a hx of non-compliance which was why Radha did not perform surgery and CT surg is recommending medical management alone at this time.  -CTS recs  -ID recs  - Vanc trough 31, hold vancomycin and repeat random level in am)  -2g cefepime IV QD  -300mg rifampin IV TID  -Brucella, Q fever, chlamidophila, bartnella all neg AMS per brother and CTH finding of hypodensity in occipital lobe concerning for septic emboli causing stroke in the setting of endocarditis. Now c/b hemorrhagic conversion on MRI.   -Neuro recs: CT angio prior to any CT surg   -Endocarditis management as below

## 2019-03-22 NOTE — PROGRESS NOTE BEHAVIORAL HEALTH - CASE SUMMARY
62y/o black male, domiciled in [place], with no formal psychiatric history, no SA, no substance abuse, with PMHx significant for endocarditis (on ceftriaxone and rifampin), hypothyroidism, BPH, history of seizures, and HTN who presents to the hospital from rehab for suicidal ideations. Per ED and nursing home documentation, the patient has been non compliant with his medications since coming to rehab. PTA to ED, patient told someone from the staff that he wanted to kill himself so he was brought to the ED. When seen, the patient repeatedly stated that he was upset that at [place] whenever he ate food, he would become nauseous and vomit. He believed there was a "bacteria contaminating the food" and had requested several people to address the issue but no one had done this, seen for psychosis. pt confused, poor historian, less agitated today. check ekg, cont ativan prn for agitaition until qtc normalizes.
62y/o black male, domiciled in [place], with no formal psychiatric history, no SA, no substance abuse, with PMHx significant for endocarditis (on ceftriaxone and rifampin), hypothyroidism, BPH, history of seizures, and HTN who presents to the hospital from rehab for suicidal ideations. Per ED and nursing home documentation, the patient has been non compliant with his medications since coming to rehab. PTA to ED, patient told someone from the staff that he wanted to kill himself so he was brought to the ED. When seen, the patient repeatedly stated that he was upset that at [place] whenever he ate food, he would become nauseous and vomit. He believed there was a "bacteria contaminating the food" and had requested several people to address the issue but no one had done this.  He was frustrated by this and told the staff that he wanted to kill himself because he wanted to get out of the nursing home. When asked about his previous hospital course, he continues to talk about the "bacteria in the food" , seen for psychosis. Pt poor historian, confused, disrobing, rec haldol 0.5mg po q7pm, haldol 0.5mg po/iv q6hr prn severe agitation.

## 2019-03-22 NOTE — PROGRESS NOTE BEHAVIORAL HEALTH - RISK ASSESSMENT
Risk factors: +acute/chronic medical conditions, acute/chronic cognitive impairments, chronic pain, not receiving treatment, delusions, recent passive suicidal statements     Protective factors: no current SIIP/HIIP, no h/o SA/SIB, no h/o psych admissions, no active substance abuse, domiciled, social supports, positive therapeutic relationship    Overall, pt is a low risk of harm to self/others.
Risk factors: +acute/chronic medical conditions, acute/chronic cognitive impairments, chronic pain, not receiving treatment, delusions, recent passive suicidal statements     Protective factors: no current SIIP/HIIP, no h/o SA/SIB, no h/o psych admissions, no active substance abuse, domiciled, social supports, positive therapeutic relationship    Overall, pt is a low risk of harm to self/others.

## 2019-03-22 NOTE — PROGRESS NOTE ADULT - ASSESSMENT
63 year old male with recent admission for endocarditis discharged on ceftriaxone and rifampin via right arm picc, hypothyroidism, ?nephrectomy, BPH, history of seizures, and HTN who presents to the hospital from rehab for suicidal ideations.     At NH, he told someone from the staff that he wanted to kill himself so he was brought to the ED  s/p 2 heart valve replacements (Aortic and mitral valve), last replacement was 2 year ago     While at [facility], found to have endocarditis on ceftriaxone and rifampin - was reported as culture negative endocarditis    Right arm picc   Now afebrile  TTE with prosthetic mitral valve with vegetation, has also prosthetic aortic valve  CT head/ MRI Head concerning for septic emboli, new areas of hemorrhage within the Right cerebellar hemisphere and right occipital lobe  Anemia, thrombocytopenia  Remains with JANEL  CMV serologies negative  EBV serologies consistent with prior infection  HIV negative  CARLY with mitral valve vegetation that prolapses into the left ventricle  Vanco level supratherapeutic  ?Mycobacterium chimera    Recommend:  -Continue cefepime renally dosed  -Can hold vancomycin for now as supratherapeutic level   -Worsening JANEL  -Would continue vancomycin by level  -Discontinue rifampin given worsening janel  -Will need to obtain chart from [facility]  -Cardiothoracic surgery input   -F/U AFB blood cultures    For Culture negative endocarditis, would F/U:  -Quant TB Gold pending  -Bartonella serologies negative  -Q fever serologies negative  -Legionella ab negative  -Brucella serologies negative  -Chlamydia serologies negative  -Treponema pallidum ab negative  -HIV negative

## 2019-03-22 NOTE — PROGRESS NOTE ADULT - PROBLEM SELECTOR PLAN 5
H/O seizures  -c/w keppra AMS 2/2 septic emboli possibly c/b underlying depression per brother  -Psych consulted  -QTc > 500 so haldol being held  -EKG QD to monitor QTc  -Per psych: may get 1mg ativan IM Q6h prn agitation until QTc shortens

## 2019-03-22 NOTE — PROGRESS NOTE BEHAVIORAL HEALTH - NSBHCONSULTMEDS_PSY_A_CORE FT
melatonin 3mg po qhs  unable to use neuroleptics at this time due to prolonged qtc, qtc on 3/22- 515ms, 519ms

## 2019-03-22 NOTE — CHART NOTE - NSCHARTNOTEFT_GEN_A_CORE
3/21 Team inquired about AC for cardiac valve.   Pt would need a conventional cerebral angiogram before vascular neuro can comment on it. (also need it before any cardiac surgery)    d/w stroke attending Dr. Michelle 3/21 Team inquired about AC for cardiac valve.   Pt would need a conventional angiogram before in order to assess cerebral vasculature. (also need it before any cardiac surgery)    d/w stroke attending Dr. Michelle

## 2019-03-22 NOTE — PROGRESS NOTE ADULT - ASSESSMENT
62 y/o M w/t PMHx of bioprosthetic AV & MV c/b endocarditis (on ceftriaxone and rifampin), hypothyroidism, BPH, history of seizures, and HTN, who presents to the hospital from rehab for suicidal ideations found to have AMS, JANEL, fever, all 2/2 endocarditis with suspected hemorrhagic septic emboli by MR. TORREZ conversation w/t brother held yesterday. Will continue with medical management for now as the patient refused doses of rifampin twice so cannot be ruled as treatment failure as of yet. 62 y/o M w/t PMHx of bioprosthetic AV & MV c/b endocarditis (on ceftriaxone and rifampin), hypothyroidism, BPH, history of seizures, and HTN, who presents to the hospital from rehab for suicidal ideations found to have AMS, JANEL, fever, all 2/2 endocarditis with suspected hemorrhagic septic emboli by  LORE conversation w/t brother held. Will continue with medical management for now as the patient refused doses of rifampin twice so cannot be ruled as treatment failure as of yet. For hypotension, 500cc LR bolus. For bradycardia, stat EKG, Jimmy, and TTE. Hypotension improved, Jimmy lower than admission, EKG unchanged from prior. TTE pending.

## 2019-03-22 NOTE — PROGRESS NOTE BEHAVIORAL HEALTH - NSBHFUPINTERVALHXFT_PSY_A_CORE
Patient seen and evaluated, awake and alert, oriented to person, disoriented to place ([facility]), unable to state the date, reports staff is treating him well here.  Reports not having interest in the food in the hospital "because it's lacking flavor", denies believing that the food has contaminants.  Disorganized in his speech, at times nonsensically.  Denies SI, plan or intent.  Reports "hearing a third party but can't make out what they're saying." Some paranoia noted, reports "There's more happening than what's really happening here." Reports good sleep.

## 2019-03-22 NOTE — PROGRESS NOTE ADULT - SUBJECTIVE AND OBJECTIVE BOX
Dr. Garret Sue   Internal Medicine PGY-1  Pager #[phone]    Patient is a 63y old  Male who presents with a chief complaint of JANEL (21 Mar 2019 12:51)      SUBJECTIVE / OVERNIGHT EVENTS:  Bradycardia to 40s ON. Asymptomatic sleeping. No acute events on tele otherwise. Has not happened before. To get STAT EKG.     MEDICATIONS  (STANDING):  atorvastatin 40 milliGRAM(s) Oral at bedtime  carvedilol 12.5 milliGRAM(s) Oral every 12 hours  cefepime   IVPB 2000 milliGRAM(s) IV Intermittent every 24 hours  hydrALAZINE 10 milliGRAM(s) Oral three times a day  isosorbide   dinitrate Tablet (ISORDIL) 10 milliGRAM(s) Oral three times a day  levETIRAcetam 500 milliGRAM(s) Oral two times a day  levothyroxine 200 MICROGram(s) Oral daily  senna 2 Tablet(s) Oral at bedtime  sodium bicarbonate 650 milliGRAM(s) Oral two times a day  tamsulosin 0.4 milliGRAM(s) Oral at bedtime  torsemide 20 milliGRAM(s) Oral daily    MEDICATIONS  (PRN):  LORazepam   Injectable 1 milliGRAM(s) IntraMuscular every 6 hours PRN Agitation      Vital Signs Last 24 Hrs  T(C): 36.8 (22 Mar 2019 04:53), Max: 36.8 (21 Mar 2019 12:18)  T(F): 98.2 (22 Mar 2019 04:53), Max: 98.3 (21 Mar 2019 12:18)  HR: 44 (22 Mar 2019 04:53) (40 - 82)  BP: 120/67 (22 Mar 2019 04:53) (81/59 - 130/78)  BP(mean): --  RR: 18 (22 Mar 2019 04:53) (18 - 18)  SpO2: 98% (22 Mar 2019 04:53) (97% - 98%)  CAPILLARY BLOOD GLUCOSE        I&O's Summary    20 Mar 2019 07:01  -  21 Mar 2019 07:00  --------------------------------------------------------  IN: 830 mL / OUT: 100 mL / NET: 730 mL    21 Mar 2019 07:01  -  22 Mar 2019 06:56  --------------------------------------------------------  IN: 330 mL / OUT: 0 mL / NET: 330 mL      PHYSICAL EXAM:  GENERAL: AAOx2, laughing inappropriately given conversation.   HEAD:  Atraumatic, Normocephalic  EYES: EOMI, PERRLA, conjunctiva and sclera clear  NECK: Supple, No JVD  CHEST/LUNG: Clear to auscultation bilaterally; No wheeze  HEART: Regular rate and rhythm; No murmurs, rubs, or gallops  ABDOMEN: Soft, Nontender, Nondistended; Bowel sounds present  EXTREMITIES:  2+ Peripheral Pulses, No clubbing, cyanosis, or edema  PSYCH: AAOx2, inappropriate affect.   NEUROLOGY: non-focal  SKIN: No rashes or lesions    LABS:                        10.1   5.71  )-----------( 91       ( 21 Mar 2019 09:25 )             33.9     03-21    139  |  100  |  26<H>  ----------------------------<  117<H>  3.6   |  23  |  2.88<H>    Ca    9.1      21 Mar 2019 06:39  Phos  3.7     03-21  Mg     1.9     03-21                RADIOLOGY & ADDITIONAL TESTS:    Imaging Personally Reviewed:    Consultant(s) Notes Reviewed:      Care Discussed with Consultants/Other Providers: Dr. Garret Sue   Internal Medicine PGY-1  Pager #[phone]    Patient is a 63y old  Male who presents with a chief complaint of JANEL (21 Mar 2019 12:51)      SUBJECTIVE / OVERNIGHT EVENTS:  Bradycardia to 40s w/t hypotension ON. Asymptomatic sleeping. Tele showing 70s-80s w/t frequent PVCs, no romaine seen. To get STAT EKG & Jimmy. Spoke to his home cardiologist yesterday and confirmed no need for anticoagulation. Patient denies CP, SOB, abdominal pain. Denies HA, N/V, and diarrhea. Affirms some difficulty seeing without his glasses. Still confused this AM, oriented to self and year.     MEDICATIONS  (STANDING):  atorvastatin 40 milliGRAM(s) Oral at bedtime  carvedilol 12.5 milliGRAM(s) Oral every 12 hours  cefepime   IVPB 2000 milliGRAM(s) IV Intermittent every 24 hours  hydrALAZINE 10 milliGRAM(s) Oral three times a day  isosorbide   dinitrate Tablet (ISORDIL) 10 milliGRAM(s) Oral three times a day  levETIRAcetam 500 milliGRAM(s) Oral two times a day  levothyroxine 200 MICROGram(s) Oral daily  senna 2 Tablet(s) Oral at bedtime  sodium bicarbonate 650 milliGRAM(s) Oral two times a day  tamsulosin 0.4 milliGRAM(s) Oral at bedtime  torsemide 20 milliGRAM(s) Oral daily    MEDICATIONS  (PRN):  LORazepam   Injectable 1 milliGRAM(s) IntraMuscular every 6 hours PRN Agitation      Vital Signs Last 24 Hrs  T(C): 36.8 (22 Mar 2019 04:53), Max: 36.8 (21 Mar 2019 12:18)  T(F): 98.2 (22 Mar 2019 04:53), Max: 98.3 (21 Mar 2019 12:18)  HR: 44 (22 Mar 2019 04:53) (40 - 82)  BP: 120/67 (22 Mar 2019 04:53) (81/59 - 130/78)  BP(mean): --  RR: 18 (22 Mar 2019 04:53) (18 - 18)  SpO2: 98% (22 Mar 2019 04:53) (97% - 98%)  CAPILLARY BLOOD GLUCOSE        I&O's Summary    20 Mar 2019 07:01  -  21 Mar 2019 07:00  --------------------------------------------------------  IN: 830 mL / OUT: 100 mL / NET: 730 mL    21 Mar 2019 07:01  -  22 Mar 2019 06:56  --------------------------------------------------------  IN: 330 mL / OUT: 0 mL / NET: 330 mL      PHYSICAL EXAM:  GENERAL: AAOx2, laughing inappropriately given conversation.   HEAD:  Atraumatic, Normocephalic  EYES: EOMI, PERRLA, conjunctiva and sclera clear  NECK: Supple, No JVD  CHEST/LUNG: Clear to auscultation bilaterally; No wheeze  HEART: Pulse was bradycardic to 40s w/t compensatory pauses between short intervals of regularity; Descrendo-murmur appreciated in LUSB.  ABDOMEN: Soft, Nontender, Nondistended; Bowel sounds present  EXTREMITIES:  2+ Peripheral Pulses, No clubbing, cyanosis, or edema  PSYCH: AAOx2, inappropriate affect.   NEUROLOGY: non-focal  SKIN: No rashes or lesions    LABS:                        10.1   5.71  )-----------( 91       ( 21 Mar 2019 09:25 )             33.9     03-21    139  |  100  |  26<H>  ----------------------------<  117<H>  3.6   |  23  |  2.88<H>    Ca    9.1      21 Mar 2019 06:39  Phos  3.7     03-21  Mg     1.9     03-21                RADIOLOGY & ADDITIONAL TESTS:    Imaging Personally Reviewed:    Consultant(s) Notes Reviewed:      Care Discussed with Consultants/Other Providers:

## 2019-03-22 NOTE — PROGRESS NOTE ADULT - PROBLEM SELECTOR PLAN 4
AMS 2/2 septic emboli possibly c/b underlying depression per brother  -Psych consulted  -QTc > 500 so haldol being held S/p bioprosthetic aortic and mitral valve replacements.  -Valves replaced > 6 months ago so no further need for AC. Verified w/t home cardiologist Dr. Lachmann that there was no indication for continued AC.   -Endocarditis management as above

## 2019-03-22 NOTE — PROGRESS NOTE ADULT - PROBLEM SELECTOR PLAN 8
Passed bladder scan w/t PVR < 200  -Tamsulosin 0.4mg QD Baseline sCr was 2.7 in December, close to baseline now  -Renally dose meds  -avoid nephrotoxins Baseline sCr was 2.7 in December, close to baseline now (2.5)  - cause likely due to pre renal causes as well as possible vanc toxicity  -Renally dose meds  -avoid nephrotoxins

## 2019-03-22 NOTE — PROGRESS NOTE BEHAVIORAL HEALTH - NSBHCHARTREVIEWVS_PSY_A_CORE FT
Vital Signs Last 24 Hrs  T(C): 36.6 (22 Mar 2019 12:12), Max: 36.8 (22 Mar 2019 04:53)  T(F): 97.9 (22 Mar 2019 12:12), Max: 98.2 (22 Mar 2019 04:53)  HR: 62 (22 Mar 2019 12:12) (40 - 82)  BP: 111/73 (22 Mar 2019 12:12) (81/59 - 130/78)  BP(mean): --  RR: 18 (22 Mar 2019 12:12) (18 - 18)  SpO2: 98% (22 Mar 2019 12:12) (97% - 98%)

## 2019-03-22 NOTE — PROGRESS NOTE BEHAVIORAL HEALTH - AXIS III
nephrectomy (2/2 cancer, did not receive chemo), HTN, hypothyroidism, s/p 2 heart valve replacements (does not know which valves), and seizures
nephrectomy (2/2 cancer, did not receive chemo), HTN, hypothyroidism, s/p 2 heart valve replacements (does not know which valves), and seizures

## 2019-03-23 NOTE — PROGRESS NOTE ADULT - PROBLEM SELECTOR PLAN 5
AMS 2/2 septic emboli possibly c/b underlying depression per brother  -Psych consulted  -QTc > 500 so haldol being held  -EKG QD to monitor QTc  -Per psych: may get 1mg ativan IM Q6h prn agitation until QTc shortens

## 2019-03-23 NOTE — PROGRESS NOTE ADULT - SUBJECTIVE AND OBJECTIVE BOX
Dr. Garret Sue   Internal Medicine PGY-1  Pager #[phone]    Patient is a 63y old  Male who presents with a chief complaint of JANEL (22 Mar 2019 13:54)      SUBJECTIVE / OVERNIGHT EVENTS:  MINA ON. No recurrence of hypotension.     MEDICATIONS  (STANDING):  atorvastatin 40 milliGRAM(s) Oral at bedtime  carvedilol 12.5 milliGRAM(s) Oral every 12 hours  ceFAZolin   IVPB      ceFAZolin   IVPB 1000 milliGRAM(s) IV Intermittent every 8 hours  chlorhexidine 4% Liquid 1 Application(s) Topical every 12 hours  hydrALAZINE 10 milliGRAM(s) Oral three times a day  isosorbide   dinitrate Tablet (ISORDIL) 10 milliGRAM(s) Oral three times a day  lactated ringers. 1000 milliLiter(s) (30 mL/Hr) IV Continuous <Continuous>  levETIRAcetam 500 milliGRAM(s) Oral two times a day  levothyroxine 200 MICROGram(s) Oral daily  melatonin 3 milliGRAM(s) Oral at bedtime  senna 2 Tablet(s) Oral at bedtime  sodium bicarbonate 650 milliGRAM(s) Oral two times a day  tamsulosin 0.4 milliGRAM(s) Oral at bedtime    MEDICATIONS  (PRN):  LORazepam   Injectable 1 milliGRAM(s) IntraMuscular every 6 hours PRN Agitation      Vital Signs Last 24 Hrs  T(C): 36.7 (23 Mar 2019 04:49), Max: 36.8 (22 Mar 2019 20:02)  T(F): 98.1 (23 Mar 2019 04:49), Max: 98.2 (22 Mar 2019 20:02)  HR: 79 (23 Mar 2019 04:49) (58 - 89)  BP: 122/73 (23 Mar 2019 04:49) (87/51 - 157/99)  BP(mean): --  RR: 18 (23 Mar 2019 04:49) (18 - 18)  SpO2: 95% (23 Mar 2019 04:49) (95% - 98%)  CAPILLARY BLOOD GLUCOSE        I&O's Summary    22 Mar 2019 07:01  -  23 Mar 2019 07:00  --------------------------------------------------------  IN: 1830 mL / OUT: 300 mL / NET: 1530 mL        PHYSICAL EXAM:  GENERAL: AAOx2, laughing inappropriately given conversation.   HEAD:  Atraumatic, Normocephalic  EYES: EOMI, PERRLA, conjunctiva and sclera clear  NECK: Supple, No JVD  CHEST/LUNG: Clear to auscultation bilaterally; No wheeze  HEART: Pulse was bradycardic to 40s w/t compensatory pauses between short intervals of regularity; Descrendo-murmur appreciated in LUSB.  ABDOMEN: Soft, Nontender, Nondistended; Bowel sounds present  EXTREMITIES:  2+ Peripheral Pulses, No clubbing, cyanosis, or edema  PSYCH: AAOx2, inappropriate affect.   NEUROLOGY: non-focal  SKIN: No rashes or lesions    LABS:                        9.7    5.4   )-----------( 87       ( 22 Mar 2019 07:16 )             30.3     03-22    142  |  103  |  24<H>  ----------------------------<  113<H>  3.9   |  24  |  3.03<H>    Ca    9.4      22 Mar 2019 07:15  Phos  3.9     03-22  Mg     2.5     03-22        CARDIAC MARKERS ( 22 Mar 2019 07:47 )  x     / x     / 58 U/L / x     / 5.1 ng/mL          RADIOLOGY & ADDITIONAL TESTS:    Imaging Personally Reviewed:    Consultant(s) Notes Reviewed:      Care Discussed with Consultants/Other Providers: Dr. Garret Sue   Internal Medicine PGY-1  Pager #[phone]    Patient is a 63y old  Male who presents with a chief complaint of JANEL (22 Mar 2019 13:54)      SUBJECTIVE / OVERNIGHT EVENTS:  MINA ON. No recurrence of hypotension. Sad this morning. Does not believe he can trust his brother or family. Denies CP, SOB, abdominal pain, and focal neuro deficits. No changes in vision or hearing. No HA.     Tele: NSR 70-110s w/t PVCs, couplets, and few triplets    MEDICATIONS  (STANDING):  atorvastatin 40 milliGRAM(s) Oral at bedtime  carvedilol 12.5 milliGRAM(s) Oral every 12 hours  ceFAZolin   IVPB      ceFAZolin   IVPB 1000 milliGRAM(s) IV Intermittent every 8 hours  chlorhexidine 4% Liquid 1 Application(s) Topical every 12 hours  hydrALAZINE 10 milliGRAM(s) Oral three times a day  isosorbide   dinitrate Tablet (ISORDIL) 10 milliGRAM(s) Oral three times a day  lactated ringers. 1000 milliLiter(s) (30 mL/Hr) IV Continuous <Continuous>  levETIRAcetam 500 milliGRAM(s) Oral two times a day  levothyroxine 200 MICROGram(s) Oral daily  melatonin 3 milliGRAM(s) Oral at bedtime  senna 2 Tablet(s) Oral at bedtime  sodium bicarbonate 650 milliGRAM(s) Oral two times a day  tamsulosin 0.4 milliGRAM(s) Oral at bedtime    MEDICATIONS  (PRN):  LORazepam   Injectable 1 milliGRAM(s) IntraMuscular every 6 hours PRN Agitation      Vital Signs Last 24 Hrs  T(C): 36.7 (23 Mar 2019 04:49), Max: 36.8 (22 Mar 2019 20:02)  T(F): 98.1 (23 Mar 2019 04:49), Max: 98.2 (22 Mar 2019 20:02)  HR: 79 (23 Mar 2019 04:49) (58 - 89)  BP: 122/73 (23 Mar 2019 04:49) (87/51 - 157/99)  BP(mean): --  RR: 18 (23 Mar 2019 04:49) (18 - 18)  SpO2: 95% (23 Mar 2019 04:49) (95% - 98%)  CAPILLARY BLOOD GLUCOSE        I&O's Summary    22 Mar 2019 07:01  -  23 Mar 2019 07:00  --------------------------------------------------------  IN: 1830 mL / OUT: 300 mL / NET: 1530 mL        PHYSICAL EXAM:  GENERAL: AAOx2  HEAD:  Atraumatic, Normocephalic  EYES: EOMI, PERRLA, conjunctiva and sclera clear  NECK: Supple, No JVD  CHEST/LUNG: Clear to auscultation bilaterally; No wheeze  HEART: Pulse was bradycardic to 40s w/t compensatory pauses between short intervals of regularity; Descrendo-murmur appreciated in LUSB.  ABDOMEN: Soft, Nontender, Nondistended; Bowel sounds present  EXTREMITIES:  2+ Peripheral Pulses, No clubbing, cyanosis, or edema  PSYCH: AAOx2, depressed affect.  NEUROLOGY: non-focal  SKIN: No rashes or lesions    LABS:                        9.7    5.4   )-----------( 87       ( 22 Mar 2019 07:16 )             30.3     03-22    142  |  103  |  24<H>  ----------------------------<  113<H>  3.9   |  24  |  3.03<H>    Ca    9.4      22 Mar 2019 07:15  Phos  3.9     03-22  Mg     2.5     03-22        CARDIAC MARKERS ( 22 Mar 2019 07:47 )  x     / x     / 58 U/L / x     / 5.1 ng/mL          RADIOLOGY & ADDITIONAL TESTS:    Imaging Personally Reviewed:    Consultant(s) Notes Reviewed:      Care Discussed with Consultants/Other Providers:

## 2019-03-23 NOTE — PROGRESS NOTE ADULT - PROBLEM SELECTOR PLAN 2
Bioprosthetic MV endocarditis   Recent hospitalization for endocarditis now c/b septic emboli. TTE confirms 3cm vegetation on bioprosthetic MV. BCx NGTD x2. s/p CARLY showing linear echo density on MV.   -Patient with persistent infection for > 7 days of abx therapy and persistent vegetation c/b emboli in spite of abx therapy so CT surgery on board.   -Per CT surg the patient has a hx of non-compliance which was why Radha did not perform surgery and CT surg is recommending medical management.  -CTS recs  -ID recs  - Vanc trough , hold vancomycin and repeat random level in am)  -2g cefepime IV QD  -hold rifampin 2/2 worsening renal fxn  -Brucella, Q fever, chlamidophila, bartnella all neg Bioprosthetic MV endocarditis   Recent hospitalization for endocarditis now c/b septic emboli. TTE confirms 3cm vegetation on bioprosthetic MV. BCx NGTD x2. s/p CARLY showing linear echo density on MV.   -Patient with persistent infection for > 7 days of abx therapy and persistent vegetation c/b emboli in spite of abx therapy so CT surgery on board.   -Per CT surg the patient has a hx of non-compliance which was why Radha did not perform surgery and CT surg is recommending medical management.  -CTS recs  -ID recs  - Vanc trough , hold vancomycin and repeat random level in am (20.7 3/23)  -1g Cefazolin Q8H  -hold rifampin 2/2 worsening renal fxn  -Brucella, Q fever, chlamidophila, bartnella all neg

## 2019-03-23 NOTE — CHART NOTE - NSCHARTNOTEFT_GEN_A_CORE
A [facility] conversation was had with multiple family members at bedside including 2 of the patient's brothers, Mr. Alana Mata and Mr. Koby Linares. The patients current condition and future plans of care were discussed as well as code status. After discussion about CPR and intubation, the patient's two brothers stated that they have already discussed the decision to make the patient DNR and DNI with all of his siblings and that everyone agreed to this designation. Both brothers then signed the MOLST form for DNR and DNI designation.     The conversation was facilitated by myself, Dr. Garret Sue. Medical student Erlinda Frazier was present. This conversation was later discussed with attending physician Dr. Fisher.    Garret Sue MD/MS  PGY-1 Internal Medicine  [facility]  Pager# [phone] ([facility])/[phone] ([facility])

## 2019-03-23 NOTE — PROVIDER CONTACT NOTE (OTHER) - ASSESSMENT
Patient A&Ox1, oriented to self, confused. Patient was sleeping during tele event, asymptomatic. /66. HR 88, temp 98.2, sp02 98%

## 2019-03-23 NOTE — PROGRESS NOTE ADULT - PROBLEM SELECTOR PLAN 8
Baseline sCr was 2.7 in December, close to baseline now (2.5)  - cause likely due to pre renal causes as well as possible vanc toxicity  -Renally dose meds  -avoid nephrotoxins Baseline sCr was 2.7 in December, close to baseline now (2.5)  FEbun = 30.5% suggesting prerenal azotemia for his rai likely 2/2 diuresis and poor po intake w/t possible intrarenal contribution from vanc toxicity  -Renally dose meds  -avoid nephrotoxins

## 2019-03-23 NOTE — PROGRESS NOTE ADULT - ASSESSMENT
62 y/o M w/t PMHx of bioprosthetic AV & MV c/b endocarditis (on ceftriaxone and rifampin), hypothyroidism, BPH, history of seizures, and HTN, who presents to the hospital from rehab for suicidal ideations found to have AMS, JANEL, fever, all 2/2 endocarditis with suspected hemorrhagic septic emboli by MR. TORREZ conversation w/t brother held. Will continue with medical management for now as the patient refused doses of rifampin twice so cannot be ruled as treatment failure as of yet. TTE pending.

## 2019-03-23 NOTE — PROVIDER CONTACT NOTE (OTHER) - ASSESSMENT
Pt is A&Ox1, reorientation provided to pt. VSS see flow sheet for reference. Pt denies chest pain, dizziness, palpitations, SOB

## 2019-03-23 NOTE — PROGRESS NOTE ADULT - PROBLEM SELECTOR PLAN 3
Hypotension likely in setting hypovolemia 2/2 diuresis & poor po intake.  -improved s/p 500cc bolus of LR  -[place] [place] lower than admission  -EKG unchanged from prior  -hold torsemide  -gentle hydration w/t 30cc/hr LR x24hrs Hypotension likely in setting hypovolemia 2/2 diuresis & poor po intake.  -improved s/p 500cc bolus of LR  -Hst [place]ps lower than admission  -EKG unchanged from prior  -hold torsemide  -dc fluids

## 2019-03-23 NOTE — PROGRESS NOTE ADULT - PROBLEM SELECTOR PLAN 4
S/p bioprosthetic aortic and mitral valve replacements.  -Valves replaced > 6 months ago so no further need for AC. Verified w/t home cardiologist Dr. Lachmann that there was no indication for continued AC.   -Endocarditis management as above

## 2019-03-23 NOTE — PROGRESS NOTE ADULT - PROBLEM SELECTOR PLAN 7
Hold hypotension meds  - coreg 12.5mg BID,   - Isordil 10mg TID,   - hydralazine 10mg TID,   -hold torsemide 20mg daily

## 2019-03-23 NOTE — PROVIDER CONTACT NOTE (OTHER) - ASSESSMENT
Pt is A&Ox1, restless, agitated, uncooperative. Reorientation attempted to be provided to pt, pt remains confused

## 2019-03-23 NOTE — PROGRESS NOTE ADULT - PROBLEM SELECTOR PLAN 1
AMS per brother and CTH finding of hypodensity in occipital lobe concerning for septic emboli causing stroke in the setting of endocarditis. Now c/b hemorrhagic conversion on MRI.   -Neuro recs: CT angio prior to any CT surg   -Endocarditis management as below

## 2019-03-24 NOTE — PROVIDER CONTACT NOTE (OTHER) - ASSESSMENT
Pt is A&Ox1 VSS See flow sheet for reference. Pt denies chest pain, dizziness, palpitations, SOB. Daily EKG performed

## 2019-03-24 NOTE — PROGRESS NOTE ADULT - PROBLEM SELECTOR PLAN 8
Baseline sCr was 2.7 in December FEbun = 30.5% suggesting prerenal azotemia for his rai likely 2/2 diuresis and poor po intake w/t possible intrarenal contribution from vanc toxicity  -Renally dose meds  -avoid nephrotoxins

## 2019-03-24 NOTE — DISCHARGE NOTE PROVIDER - PROVIDER TOKENS
FREE:[LAST:[Lachmann],FIRST:[Monica],PHONE:[phone]],FAX:[(   [phone]    -],ADDRESS:[Cardiologist  [address] [place] [address]],FOLLOWUP:[1 week]],PROVIDER:[TOKEN:[[id number]]]

## 2019-03-24 NOTE — DISCHARGE NOTE PROVIDER - CARE PROVIDERS DIRECT ADDRESSES
,DirectAddress_Unknown,[email or web address]

## 2019-03-24 NOTE — PROGRESS NOTE ADULT - PROBLEM SELECTOR PLAN 1
AMS 2/2 septic emboli to occipital lobe causing stroke in the setting of endocarditis. Now c/b hemorrhagic conversion on MRI.   -Neuro recs: CT angio prior to any CT surg   -Endocarditis management as below

## 2019-03-24 NOTE — CHART NOTE - NSCHARTNOTEFT_GEN_A_CORE
CT surgery previously consulted for MV endocarditis. Pt not a good candidate for CT surgery given recent acute CVA w/ hemorrhagic conversion on MRI. Pt has hx of non-compliance and was previously denied by [facility] for CT surgery. Recommending medical management per primary team. Discussed w/ CTS attending.  CTS #[id number]

## 2019-03-24 NOTE — DISCHARGE NOTE PROVIDER - HOSPITAL COURSE
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 Per ED and nursing home documentation, the patient has been non compliant with his medications since coming to rehab and even missed 2 doses of his rifampin. Today, he told someone from the staff that he wanted to kill himself so he was brought to the ED. When seen, the patient repeatedly stated that he was upset that at [facility] whenever he ate food, he would become nauseous and vomit. He believed there was a "bacteria contaminating the food" and had requested several people to address the issue but no one had done this.  He was frustrated by this and told the staff that he wanted to kill himself. He was initially AAOx3 otherwise on presentation.          Of note, 1 month ago the patient's brother found the patient unresponsive on the ground and called EMS who brought him to [facility] and he was  subsequently transferred to [facility]. While at [facility], he was found to have mitral valve endocarditis. Per the brother, the plan was to treat him with antibiotics for 1 month and then reassess. The brother states he does not have any history of psychiatric issues        In the ED, VS significant for Tmax 103, HR: 85, BP: 104/58, RR: 16 sating 98% on RA. While in the ED he was started on abx, ID was consulted, cards was consulted, and cardiothoracic surgery was consulted. Repeat TTE revealed that the vegetation had grown to 3cm in length. His LVEF was 20-25%. While in the ED, he became acutely more altered and code stroke was called. CTH revealed that he had developed hypodensity in the R occiptial lobe concern for a septic embolism. 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 Per ED and nursing home documentation, the patient has been non compliant with his medications since coming to rehab and even missed 2 doses of his rifampin. Today, he told someone from the staff that he wanted to kill himself so he was brought to the ED. When seen, the patient repeatedly stated that he was upset that at [facility] whenever he ate food, he would become nauseous and vomit. He believed there was a "bacteria contaminating the food" and had requested several people to address the issue but no one had done this.  He was frustrated by this and told the staff that he wanted to kill himself. He was initially AAOx3 otherwise on presentation.          Of note, 1 month ago the patient's brother found the patient unresponsive on the ground and called EMS who brought him to [facility] and he was  subsequently transferred to [facility]. While at [facility], he was found to have mitral valve endocarditis. Per the brother, the plan was to treat him with antibiotics for 1 month and then reassess. The brother states he does not have any history of psychiatric issues        In the ED, VS significant for Tmax 103, HR: 85, BP: 104/58, RR: 16 sating 98% on RA. While in the ED, ID was consulted, cards was consulted, and cardiothoracic surgery was consulted. Repeat TTE revealed that the vegetation had grown to 3cm in length. His LVEF was 20-25%. He was started on vancomycin, ceftriaxone, and rifampin. While in the ED, he became acutely more altered and code stroke was called. CTH revealed that he had developed hypodensity in the R occiptial lobe concern for a septic embolism. MRI later revealed hemorrhagic conversion of the embolism as well. His course was c/b janel on CKD and his vanc trough became supratherapeutic. Rifampin was held due to JANEL as well. He was swtiched from ceftriaxone to cefepime renally dosed. Per CT surgery, he was not intervened on at [facility] due to noncompliance and due to hemorrhagic conversion of his stroke they did not feel he was a good candidate for surgery. He had 1 episode of hypotension, so he was given 500cc LR and torsemide was held. He developed a high burden of PVCs on tele. A new murmur was appreciated after the episode of hypotension so a bedside TTE was repeated showing an acute drop in LVEF to 16%. CT surgery again confirmed he was not a candidate for surgery. His antibiotics were titrated to steady state. 2 of his brothers came to the hospital and [facility] was evaluated. They discussed it with his other siblings and decided to make him DNR/DNI. He is hemodynamically stable and ready for discharge to rehab to complete his course of IV antibiotics.

## 2019-03-24 NOTE — PROGRESS NOTE ADULT - PROBLEM SELECTOR PLAN 2
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 TTE confirms 3cm vegetation on bioprosthetic MV. BCx NGTD x2. s/p CARLY showing linear echo density on MV.   -Patient with persistent infection for > 7 days of abx therapy and persistent vegetation c/b emboli in spite of abx therapy so CT surgery on board.   -Per CT surg the patient has a hx of non-compliance which was why [facility] did not perform surgery and CT surg is recommending medical management.  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 TTE confirms 3cm vegetation on bioprosthetic MV. BCx NGTD x2. s/p CARLY showing linear echo density on MV. TTE showing new drop in EF to 16% and veg extending deep into the LV.  -Patient with persistent infection for > 7 days of abx therapy and persistent vegetation c/b emboli in spite of abx therapy so CT surgery on board.   -Per CT surg the patient has a hx of non-compliance which was why [facility] did not perform surgery and CT surg is recommending medical management.  -CTS recs: to discuss new TTE findings  -ID recs  - Vanc trough , hold vancomycin and repeat random level in am (20.7 3/23)  -1g Cefazolin Q8H  -hold rifampin 2/2 worsening renal fxn  -Brucella, Q fever, chlamidophila, bartnella all neg

## 2019-03-24 NOTE — PROGRESS NOTE ADULT - SUBJECTIVE AND OBJECTIVE BOX
Dr. Garret Sue   Internal Medicine PGY-1  Pager #[phone]    Patient is a 63y old  Male who presents with a chief complaint of JANEL (23 Mar 2019 07:03)      SUBJECTIVE / OVERNIGHT EVENTS:  MINA ON. AAOx1. No CP, SOB, or abdominal pain. No new focal neuro deficits.     MEDICATIONS  (STANDING):  atorvastatin 40 milliGRAM(s) Oral at bedtime  carvedilol 12.5 milliGRAM(s) Oral every 12 hours  cefepime   IVPB 1000 milliGRAM(s) IV Intermittent every 24 hours  chlorhexidine 4% Liquid 1 Application(s) Topical every 12 hours  hydrALAZINE 10 milliGRAM(s) Oral three times a day  isosorbide   dinitrate Tablet (ISORDIL) 10 milliGRAM(s) Oral three times a day  levETIRAcetam 500 milliGRAM(s) Oral two times a day  levothyroxine 200 MICROGram(s) Oral daily  melatonin 3 milliGRAM(s) Oral at bedtime  potassium chloride    Tablet ER 40 milliEquivalent(s) Oral once  senna 2 Tablet(s) Oral at bedtime  sodium bicarbonate 650 milliGRAM(s) Oral two times a day  tamsulosin 0.4 milliGRAM(s) Oral at bedtime    MEDICATIONS  (PRN):  LORazepam   Injectable 1 milliGRAM(s) IntraMuscular every 6 hours PRN Agitation      Vital Signs Last 24 Hrs  T(C): 37 (24 Mar 2019 05:59), Max: 37 (24 Mar 2019 00:42)  T(F): 98.6 (24 Mar 2019 05:59), Max: 98.6 (24 Mar 2019 00:42)  HR: 78 (24 Mar 2019 05:59) (75 - 84)  BP: 115/84 (24 Mar 2019 05:59) (106/69 - 137/78)  BP(mean): --  RR: 18 (24 Mar 2019 05:59) (18 - 18)  SpO2: 97% (24 Mar 2019 05:59) (97% - 99%)  CAPILLARY BLOOD GLUCOSE        I&O's Summary    23 Mar 2019 07:01  -  24 Mar 2019 07:00  --------------------------------------------------------  IN: 320 mL / OUT: 0 mL / NET: 320 mL      PHYSICAL EXAM:  GENERAL: AAOx2  HEAD:  Atraumatic, Normocephalic  EYES: EOMI, PERRLA, conjunctiva and sclera clear  NECK: Supple, No JVD  CHEST/LUNG: Clear to auscultation bilaterally; No wheeze  HEART: Pulse was bradycardic to 40s w/t compensatory pauses between short intervals of regularity; Descrendo-murmur appreciated in LUSB.  ABDOMEN: Soft, Nontender, Nondistended; Bowel sounds present  EXTREMITIES:  2+ Peripheral Pulses, No clubbing, cyanosis, or edema  PSYCH: AAOx2, depressed affect.  NEUROLOGY: non-focal  SKIN: No rashes or lesions    LABS:                        9.0    5.43  )-----------( 74       ( 23 Mar 2019 09:51 )             29.0     03-24    140  |  103  |  23  ----------------------------<  98  3.9   |  24  |  2.83<H>    Ca    9.0      24 Mar 2019 06:21  Phos  3.7     03-24  Mg     2.2     03-24        CARDIAC MARKERS ( 22 Mar 2019 07:47 )  x     / x     / 58 U/L / x     / 5.1 ng/mL          RADIOLOGY & ADDITIONAL TESTS:    Imaging Personally Reviewed:    Consultant(s) Notes Reviewed:      Care Discussed with Consultants/Other Providers: Dr. Garret Sue   Internal Medicine PGY-1  Pager #[phone]    Patient is a 63y old  Male who presents with a chief complaint of JANEL (23 Mar 2019 07:03)      SUBJECTIVE / OVERNIGHT EVENTS:  MINA ON. AAOx1. No CP, SOB, or abdominal pain. No new focal neuro deficits.     MEDICATIONS  (STANDING):  atorvastatin 40 milliGRAM(s) Oral at bedtime  carvedilol 12.5 milliGRAM(s) Oral every 12 hours  cefepime   IVPB 1000 milliGRAM(s) IV Intermittent every 24 hours  chlorhexidine 4% Liquid 1 Application(s) Topical every 12 hours  hydrALAZINE 10 milliGRAM(s) Oral three times a day  isosorbide   dinitrate Tablet (ISORDIL) 10 milliGRAM(s) Oral three times a day  levETIRAcetam 500 milliGRAM(s) Oral two times a day  levothyroxine 200 MICROGram(s) Oral daily  melatonin 3 milliGRAM(s) Oral at bedtime  potassium chloride    Tablet ER 40 milliEquivalent(s) Oral once  senna 2 Tablet(s) Oral at bedtime  sodium bicarbonate 650 milliGRAM(s) Oral two times a day  tamsulosin 0.4 milliGRAM(s) Oral at bedtime    MEDICATIONS  (PRN):  LORazepam   Injectable 1 milliGRAM(s) IntraMuscular every 6 hours PRN Agitation      Vital Signs Last 24 Hrs  T(C): 37 (24 Mar 2019 05:59), Max: 37 (24 Mar 2019 00:42)  T(F): 98.6 (24 Mar 2019 05:59), Max: 98.6 (24 Mar 2019 00:42)  HR: 78 (24 Mar 2019 05:59) (75 - 84)  BP: 115/84 (24 Mar 2019 05:59) (106/69 - 137/78)  BP(mean): --  RR: 18 (24 Mar 2019 05:59) (18 - 18)  SpO2: 97% (24 Mar 2019 05:59) (97% - 99%)  CAPILLARY BLOOD GLUCOSE        I&O's Summary    23 Mar 2019 07:01  -  24 Mar 2019 07:00  --------------------------------------------------------  IN: 320 mL / OUT: 0 mL / NET: 320 mL      PHYSICAL EXAM:  GENERAL: AAOx2  HEAD:  Atraumatic, Normocephalic  EYES: EOMI, PERRLA, conjunctiva and sclera clear  NECK: Supple, No JVD  CHEST/LUNG: Clear to auscultation bilaterally; No wheeze  HEART: Pulse was bradycardic to 40s w/t compensatory pauses between short intervals of regularity; Descrendo-murmur appreciated in LUSB.  ABDOMEN: Soft, Nontender, Nondistended; Bowel sounds present  EXTREMITIES:  2+ Peripheral Pulses, No clubbing, cyanosis, or edema  PSYCH: AAOx2, depressed affect.  NEUROLOGY: non-focal  SKIN: No rashes or lesions    LABS:                        9.0    5.43  )-----------( 74       ( 23 Mar 2019 09:51 )             29.0     03-24    140  |  103  |  23  ----------------------------<  98  3.9   |  24  |  2.83<H>    Ca    9.0      24 Mar 2019 06:21  Phos  3.7     03-24  Mg     2.2     03-24        CARDIAC MARKERS ( 22 Mar 2019 07:47 )  x     / x     / 58 U/L / x     / 5.1 ng/mL          RADIOLOGY & ADDITIONAL TESTS:    < from: Transthoracic Echocardiogram (03.24.19 @ 15:12) >  EF (Teicholtz): 14 %  ------------------------------------------------------------------------  Observations:  Mitral Valve: Bioprosthetic mitral valve replacement.  Multiple mobile echodensities are visualized in association  with the prosthetic mitral valve leaflets and are  consistent with vegetations.  One particularly long  echodensity extends deep into the LV cavity during the  cardiac cycle. Minimal mitral regurgitation. Mean  transmitral valve gradient equals 5 mm Hg, which is  probably normal in the setting of a bioprosthetic mitral  valve replacement.  Aortic Valve/Aorta: Bioprosthetic aortic valve replacement,  which is well seated with normal leaflet motion. Peak  transaortic valve gradient equals 9 mm Hg, mean transaortic  valve gradient equals 6 mm Hg, which is probably normal in  the presence of a bioprosthetic aortic valve. No aortic  valve regurgitation seen.  Normal aortic root size. (Ao: 2.9 cm at the sinuses of  Valsalva).  Left Atrium: Moderately dilated left atrium.  LA volume  index = 45 cc/m2.  Left Ventricle: Severe global left ventricular systolic  dysfunction. Mild left ventricular enlargement.  Right Heart: Normal right atrium. Right ventricular  enlargement with decreased right ventricular systolic  function. Normal tricuspid valve. Mild tricuspid  regurgitation. Normal pulmonic valve.  Pericardium/Pleura: Normal pericardium with no pericardial  effusion.  Hemodynamic: Estimated right atrial pressure is 8 mm Hg.  ------------------------------------------------------------------------  Conclusions:  1. Bioprosthetic mitral valve replacement.  Multiple mobile  echodensities are visualized in association with the  prosthetic mitral valve leaflets and are consistent with  vegetations.  One particularly long echodensity extends  deep into the LV cavity during the cardiac cycle. Minimal  mitral regurgitation. Mean transmitral valve gradient  equals 5 mm Hg, which is probably normal in the setting of  a bioprosthetic mitral valve replacement.  2. Bioprosthetic aortic valve replacement, which is well  seated with normal leaflet motion. Peak transaortic valve  gradient equals 9 mm Hg, mean transaortic valve gradient  equals 6 mm Hg, which is probably normal in the presence of  a bioprosthetic aortic valve. No aortic valve regurgitation  seen.  3. Moderately dilated left atrium.  LA volume index = 45  cc/m2.  4.Mild left ventricular enlargement.  5. Severe global left ventricular systolic dysfunction.  6. Right ventricular enlargement with decreased right  ventricular systolic function.  *** Compared with echocardiogram (CARLY) of 3/18/2019, the  findings aresimilar on the current study.    < end of copied text >

## 2019-03-24 NOTE — DISCHARGE NOTE PROVIDER - NSDCCPCAREPLAN_GEN_ALL_CORE_FT
PRINCIPAL DISCHARGE DIAGNOSIS  Diagnosis: Endocarditis  Assessment and Plan of Treatment: You have a large infection on your bioprosthetic mitral valve. For this problem you are being treated with intravenous antibiotics. You will complete your course of antibiotics at rehab. Please continue to take ___ and followup with your infectious disease doctor after you complete antibiotics.      SECONDARY DISCHARGE DIAGNOSES  Diagnosis: Septic embolism  Assessment and Plan of Treatment: While in the hospital, you became very confused. This is because a clump of bacteria from your mitral valve broke off and got lodged in a blood vessel supplying your brain causing a stroke. For this problem you are being treated for endocarditis and your blood pressure was controlled.    Diagnosis: Pancytopenia  Assessment and Plan of Treatment: While in the hospital you blood cells were low. This was likely due to infection as it resovled on its own. You may have your primary care check your cell counts after you complete antibiotics.    Diagnosis: CHF (congestive heart failure)  Assessment and Plan of Treatment: Due to your heart valve surgeries and your infection of the heart valves, you have a weak heart. Please followup on this problem with your cardiologist.    Diagnosis: Hypotension due to hypovolemia  Assessment and Plan of Treatment: Your blood pressure became low during your hospitalization. Please do not take torsemide anymore.    Diagnosis: BPH (benign prostatic hyperplasia)  Assessment and Plan of Treatment: Please continue to take 0.4mg of tamsulosin daily.

## 2019-03-24 NOTE — DISCHARGE NOTE PROVIDER - CARE PROVIDER_API CALL
Lachmann, Justine  Cardiologist  [address] [place] [address]  Phone: [phone]  Fax: (   )    -  Follow Up Time: 1 week    Aris Wells)  Internal Medicine  [address] [place] [address]  Phone: [phone]  Fax: [phone]  Follow Up Time:

## 2019-03-24 NOTE — PROGRESS NOTE ADULT - PROBLEM SELECTOR PLAN 3
Hypotension likely in setting hypovolemia 2/2 diuresis & poor po intake.  -improved s/p 500cc bolus of LR  -[place] [place] lower than admission  -EKG unchanged from prior  -hold torsemide  -dc fluids

## 2019-03-24 NOTE — PROGRESS NOTE ADULT - PROBLEM SELECTOR PLAN 4
S/p bioprosthetic aortic and mitral valve replacements.  -Valves replaced > 6 months ago so no further need for AC. Verified w/t home cardiologist Dr. Lachmann that there was no indication for continued AC.   -Endocarditis management as above activity/movement

## 2019-03-25 NOTE — PROGRESS NOTE ADULT - PROBLEM SELECTOR PLAN 3
Hypotension likely in setting hypovolemia 2/2 diuresis & poor po intake.  -improved s/p 500cc bolus of LR  -[place] [place] lower than admission  -EKG unchanged from prior  -hold torsemide  -dc fluids S/p bioprosthetic aortic and mitral valve replacements.  -Valves replaced > 6 months ago so no further need for AC. Verified w/t home cardiologist Dr. Lachmann that there was no indication for continued AC.   -Endocarditis management as above

## 2019-03-25 NOTE — DIETITIAN INITIAL EVALUATION ADULT. - OTHER INFO
Pt seen for length of stay on [facility]. Limited subjective information available. Pt with <50% po intake > 7 days per nursing flow sheets. RN endorsing po intake seems to depend on pt mood. Pt unable to provide additional food preferences at this time, only states he keeps getting oatmeal even though he doesn't ask for it. Of note, pt with recent hospitalization at [facility]SH where he kept complaining that the food being provided was "contaminated." Per psych, pt does not make this claim currently, however has stated the food is "lacking flavor." No chewing/swallowing difficulties noted. No acute GI distress noted per chart. Pt dosing wt recorded as 195 lbs, with a subsequent wt noted as 177 lbs (standing 3/20). Question accuracy vs. different scale methods used ? Pt is not a nutrition education candidate. See recommendations below.

## 2019-03-25 NOTE — PROGRESS NOTE ADULT - PROBLEM SELECTOR PLAN 10
May be marrow suppression 2/2 sepsis.  Anemia possibly in setting of CKD. Past records showing normal cell cts acquired. Likely sepsis induced suppression.  - Parvo neg,CMV neg, EBV neg, HIV neg  -cbc qd    DVT PPx: SCDs  Diet: DASH/Renal  Dispo: pending abx plan DVT: holding pharm ppx given bleeding risk given septic emboli. SCDs    Problem 11: Ectopy. Pt found to have many PVCs on tele.   - BB switched from carvedilol 12.5 to metoprolol and uptitrated to lopressor 25mg BID  - Will continue to monitor on tele.

## 2019-03-25 NOTE — PROGRESS NOTE ADULT - PROBLEM SELECTOR PLAN 2
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 TTE confirms 3cm vegetation on bioprosthetic MV. BCx NGTD x2. s/p CARLY showing linear echo density on MV. TTE showing new drop in EF to 16% and veg extending deep into the LV.  -Patient with persistent infection for > 7 days of abx therapy and persistent vegetation c/b emboli in spite of abx therapy so CT surgery on board.   -Per CT surg the patient has a hx of non-compliance which was why [facility] did not perform surgery and CT surg is recommending medical management.  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 TTE confirms 3cm vegetation on bioprosthetic MV. BCx NGTD x2. s/p CARLY showing linear echo density on MV. TTE showing new drop in EF to 16% and veg extending deep into the LV.  -Patient with persistent infection for > 7 days of abx therapy and persistent vegetation c/b emboli in spite of abx therapy so CT surgery on board.   -Per CT surg the patient has a hx of non-compliance which was why [facility] did not perform surgery and CT surg is recommending medical management.  -CTS recs: to discuss new TTE findings  -ID recs: Will need 6 wks of abx via PICC  - Vanc trough in AM. Continue to dose Vanc by lvl  -1g Cefazolin Q8H  -hold rifampin 2/2 worsening renal fxn  -Brucella, Q fever, chlamidophila, bartonella all neg

## 2019-03-25 NOTE — DIETITIAN INITIAL EVALUATION ADULT. - ENERGY NEEDS
Height: inches, Weight: pounds  BMI: kg/m2 IBW: pounds (+/-10%), %IBW:  Pertinent Info: No edema noted, no pressure injuries noted at this time in nursing flow sheet.  Other pertinent info: Pt is 63yoM with PMH significant for CHF, HTN, hypothyroidism, seizures, h/o nephrectomy, h/o heart valve replacements, presenting with endocarditis, admitted from rehab where he was being treated with antibiotics following recent hospitalization, with suicidal ideation. Psych following. "AMS 2/2 septic emboli to occipital lobe causing stroke in the setting of endocarditis. Now c/b hemorrhagic conversion on MRI," pt with h/o non-compliance with meds. Also with JANEL, fever. CT surgery following. +Pancytopenia. Height: inches, Weight: pounds  BMI: kg/m2 IBW: pounds (+/-10%), %IBW:  Pertinent Info: No edema noted, no pressure injuries noted at this time in nursing flow sheet.  Other pertinent info: Pt is 63yoM with PMH significant for CHF, HTN, hypothyroidism, seizures, h/o nephrectomy, h/o heart valve replacements, presenting with endocarditis, admitted from rehab where he was being treated with antibiotics following recent hospitalization, with suicidal ideation. Psych following. "AMS 2/2 septic emboli to occipital lobe causing stroke in the setting of endocarditis. Now c/b hemorrhagic conversion on MRI," pt with h/o non-compliance with meds. Also with JANEL, fever. CT surgery following. +Pancytopenia. Pt DNR/DNI per chart. Height: 71 inches, Weight: 177 pounds (3/20, standing)  BMI: 24.7 kg/m2 IBW: 172 pounds (+/-10%), %IBW: 103%  Pertinent Info: No edema noted, no pressure injuries noted at this time in nursing flow sheet.  Other pertinent info: Pt is 63yoM with PMH significant for CHF, HTN, hypothyroidism, seizures, h/o nephrectomy, h/o heart valve replacements, presenting with endocarditis, admitted from rehab where he was being treated with antibiotics following recent hospitalization, with suicidal ideation. Psych following. "AMS 2/2 septic emboli to occipital lobe causing stroke in the setting of endocarditis. Now c/b hemorrhagic conversion on MRI," pt with h/o non-compliance with meds. Also with JANEL, fever. CT surgery following. +Pancytopenia. Pt DNR/DNI per chart.

## 2019-03-25 NOTE — PROGRESS NOTE ADULT - SUBJECTIVE AND OBJECTIVE BOX
CC: Patient is a 63y old  Male who presents with a chief complaint of JANEL (25 Mar 2019 07:13)    ID following for endocarditis    Interval History/ROS: Patient has no new complaints. Agitated when asked same questions. Denies fever, chills.    Rest of ROS negative.    Allergies  penicillin (Swelling)    ANTIMICROBIALS:  cefepime   IVPB 1000 every 24 hours    OTHER MEDS:  atorvastatin 40 milliGRAM(s) Oral at bedtime  chlorhexidine 4% Liquid 1 Application(s) Topical every 12 hours  hydrALAZINE 10 milliGRAM(s) Oral three times a day  isosorbide   dinitrate Tablet (ISORDIL) 10 milliGRAM(s) Oral three times a day  levETIRAcetam 500 milliGRAM(s) Oral two times a day  levothyroxine 200 MICROGram(s) Oral daily  LORazepam   Injectable 1 milliGRAM(s) IntraMuscular every 6 hours PRN  melatonin 3 milliGRAM(s) Oral at bedtime  metoprolol tartrate 25 milliGRAM(s) Oral every 12 hours  senna 2 Tablet(s) Oral at bedtime  sodium bicarbonate 650 milliGRAM(s) Oral two times a day  tamsulosin 0.4 milliGRAM(s) Oral at bedtime    PE:    Vital Signs Last 24 Hrs  T(C): 37 (25 Mar 2019 17:23), Max: 37.3 (25 Mar 2019 00:05)  T(F): 98.6 (25 Mar 2019 17:23), Max: 99.2 (25 Mar 2019 00:05)  HR: 108 (25 Mar 2019 17:23) (58 - 108)  BP: 162/92 (25 Mar 2019 17:23) (95/55 - 162/92)  BP(mean): --  RR: 18 (25 Mar 2019 17:23) (18 - 18)  SpO2: 98% (25 Mar 2019 17:23) (96% - 99%)    Gen: Awake, alert, NAD  CV: S1+S2 normal, no murmurs  Resp: Clear bilat, no resp distress  Abd: Soft, nontender, +BS  Ext: No LE edema, no wounds  : No Griffith  IV/Skin: No thrombophlebitis, right arm picc intact  Neuro: no focal deficits      LABS:                          8.9    3.35  )-----------( 66       ( 25 Mar 2019 08:58 )             28.5       03-25    143  |  106  |  22  ----------------------------<  102<H>  4.1   |  23  |  2.66<H>    Ca    8.9      25 Mar 2019 07:16  Phos  2.9     03-25  Mg     2.2     03-25    MICROBIOLOGY:  Vancomycin Level, Trough: 15.6 ug/mL (03-25-19 @ 07:16)  v  .Blood Blood  03-22-19   Culture is being performed.  --  --      .Urine Catheterized  03-18-19   No growth  --  --      .Blood Blood-Peripheral  03-15-19   No growth at 5 days.  --  --    CMV IgG Antibody: 0.27 U/mL (03-17-19 @ 12:53)    RADIOLOGY:  < from: Xray Chest 1 View- PORTABLE-Urgent (03.18.19 @ 21:39) >  FINDINGS/  IMPRESSION:    Tip of the right PICC within SVC. Mild left basilar atelectasis.    No consolidation, pleural effusion or pneumothorax. No pulmonary edema.    The cardiac silhouette remains enlarged. Sternotomy, aortic and mitral   valve replacement. Mitral appendage occlusion.    < end of copied text >

## 2019-03-25 NOTE — PROGRESS NOTE ADULT - PROBLEM SELECTOR PLAN 7
Hold hypotension meds  - coreg 12.5mg BID,   - Isordil 10mg TID,   - hydralazine 10mg TID,   -hold torsemide 20mg daily Baseline sCr was 2.7 in December FEbun = 30.5% suggesting prerenal azotemia for his rai likely 2/2 diuresis and poor po intake w/t possible intrarenal contribution from vanc toxicity  -Renally dose meds  -avoid nephrotoxins

## 2019-03-25 NOTE — PROGRESS NOTE ADULT - PROBLEM SELECTOR PLAN 5
AMS 2/2 septic emboli possibly c/b underlying depression per brother  -Psych consulted  -QTc > 500 so haldol being held  -EKG QD to monitor QTc  -Per psych: may get 1mg ativan IM Q6h prn agitation until QTc shortens H/O seizures  -c/w keppra

## 2019-03-25 NOTE — PROGRESS NOTE ADULT - PROBLEM SELECTOR PLAN 4
S/p bioprosthetic aortic and mitral valve replacements.  -Valves replaced > 6 months ago so no further need for AC. Verified w/t home cardiologist Dr. Lachmann that there was no indication for continued AC.   -Endocarditis management as above AMS 2/2 septic emboli possibly c/b underlying depression per brother  -Psych consulted  -QTc > 500 so haldol being held  -EKG QD to monitor QTc  -Per psych: may get 1mg ativan IM Q6h prn agitation until QTc shortens

## 2019-03-25 NOTE — PROGRESS NOTE ADULT - PROBLEM SELECTOR PLAN 9
H/O hypothyroidism  -200mcg levothyroxine PO QD May be marrow suppression 2/2 sepsis.  Anemia possibly in setting of CKD. Past records showing normal cell cts acquired. Likely sepsis induced suppression.  - Parvo neg,CMV neg, EBV neg, HIV neg  -cbc qd    DVT PPx: SCDs  Diet: DASH/Renal  Dispo: pending abx plan

## 2019-03-25 NOTE — PROGRESS NOTE ADULT - PROBLEM SELECTOR PLAN 8
Baseline sCr was 2.7 in December FEbun = 30.5% suggesting prerenal azotemia for his rai likely 2/2 diuresis and poor po intake w/t possible intrarenal contribution from vanc toxicity  -Renally dose meds  -avoid nephrotoxins H/O hypothyroidism  -200mcg levothyroxine PO QD

## 2019-03-25 NOTE — PROGRESS NOTE ADULT - ASSESSMENT
63 year old male with recent admission for endocarditis discharged on ceftriaxone and rifampin via right arm picc, hypothyroidism, ?nephrectomy, BPH, history of seizures, and HTN who presents to the hospital from rehab for suicidal ideations.     At NH, he told someone from the staff that he wanted to kill himself so he was brought to the ED  s/p 2 heart valve replacements (Aortic and mitral valve), last replacement was 2 year ago     While at [place], found to have endocarditis on ceftriaxone and rifampin - was reported as culture negative endocarditis    Right arm picc   Now afebrile  TTE with prosthetic mitral valve with vegetation, has also prosthetic aortic valve  CT head/ MRI Head concerning for septic emboli, new areas of hemorrhage within the Right cerebellar hemisphere and right occipital lobe  Anemia, thrombocytopenia  Remains with JANEL  CMV serologies negative  EBV serologies consistent with prior infection  HIV negative  CARLY with mitral valve vegetation that prolapses into the left ventricle  Vanco level supratherapeutic  ?Mycobacterium chimera    Recommend:  -Continue cefepime and vancomycin renally dosed  -F/U AFB blood cultures    For Culture negative endocarditis, would F/U:  -Quant TB Gold pending  -Bartonella serologies negative  -Q fever serologies negative  -Legionella ab negative  -Brucella serologies negative  -Chlamydia serologies negative  -Treponema pallidum ab negative  -HIV negative

## 2019-03-25 NOTE — PROGRESS NOTE ADULT - ASSESSMENT
62 y/o M w/t PMHx of bioprosthetic AV & MV c/b endocarditis (on ceftriaxone and rifampin), hypothyroidism, BPH, history of seizures, and HTN, who presents to the hospital from rehab for suicidal ideations found to have AMS, JANEL, fever, all 2/2 endocarditis with suspected hemorrhagic septic emboli by MR. TORREZ conversation w/t brothers held, patient now DNR/DNI 3/23. Will continue with medical management for now as the patient refused doses of rifampin twice so cannot be ruled as treatment failure as of yet. TTE this AM demonstrating acute drop in EF to 16% w/t 1 long vegetation extending from the MV deep into the LV. Will discuss results with CT surgery. 64 y/o M w/t PMHx of bioprosthetic AV & MV c/b endocarditis (on ceftriaxone and rifampin), hypothyroidism, BPH, history of seizures, and HTN, who presents to the hospital from rehab for suicidal ideations found to have AMS, JANEL, fever, all 2/2 endocarditis with suspected hemorrhagic septic emboli by MR. TORREZ conversation w/t brothers held, patient now DNR/DNI 3/23. Will continue with medical management for now as the patient refused doses of rifampin twice so cannot be ruled as treatment failure as of yet. TTE this AM demonstrating acute drop in EF to 16% w/t 1 long vegetation extending from the MV deep into the LV.

## 2019-03-25 NOTE — DIETITIAN INITIAL EVALUATION ADULT. - PT NOT SOURCE
pt noted with AMS, underlying depression per chart, psych following. Pt unable to participate appropriately in RD interview./confused

## 2019-03-25 NOTE — DIETITIAN INITIAL EVALUATION ADULT. - NS AS NUTRI INTERV MEDICAL AND FOOD SUPPLEMENTS
Recommend addition of health shakes BID to help pt meet estimated nutrient needs. Monitor for acceptance./[employer]

## 2019-03-25 NOTE — DIETITIAN INITIAL EVALUATION ADULT. - NS AS NUTRI INTERV MEALS SNACK
Recommend change diet to low sodium, 60g protein restriction. Continue to obtain/provide food preferences as feasible./General/healthful diet General/healthful diet/Recommend change diet to low sodium, 60g protein restriction. Monitor renal indices for need for additional restrictions. Continue to obtain/provide food preferences as feasible.

## 2019-03-25 NOTE — DIETITIAN INITIAL EVALUATION ADULT. - PHYSICAL APPEARANCE
other (specify)/BMI 24.7, unable to perform Nutrition focused physical exam at this time secondary to pt mental status, unable to provide consent. No obvious visible signs of muscle wasting or fat loss at this time.

## 2019-03-25 NOTE — PROGRESS NOTE ADULT - SUBJECTIVE AND OBJECTIVE BOX
David Mo MD  PGY 1 Department of Internal Medicine  Pager: [phone]/ [phone]    Patient is a 63y old  Male who presents with a chief complaint of JANEL (25 Mar 2019 07:13)      SUBJECTIVE / OVERNIGHT EVENTS: Pt seen and examined. No acute overnight events. Pt reported no fever, chills, CP, SOB, abdominal pain, N/V/D, dysuria, or new joint aches.     MEDICATIONS  (STANDING):  atorvastatin 40 milliGRAM(s) Oral at bedtime  cefepime   IVPB 1000 milliGRAM(s) IV Intermittent every 24 hours  chlorhexidine 4% Liquid 1 Application(s) Topical every 12 hours  hydrALAZINE 10 milliGRAM(s) Oral three times a day  isosorbide   dinitrate Tablet (ISORDIL) 10 milliGRAM(s) Oral three times a day  levETIRAcetam 500 milliGRAM(s) Oral two times a day  levothyroxine 200 MICROGram(s) Oral daily  melatonin 3 milliGRAM(s) Oral at bedtime  metoprolol tartrate 25 milliGRAM(s) Oral every 12 hours  senna 2 Tablet(s) Oral at bedtime  sodium bicarbonate 650 milliGRAM(s) Oral two times a day  tamsulosin 0.4 milliGRAM(s) Oral at bedtime    MEDICATIONS  (PRN):  LORazepam   Injectable 1 milliGRAM(s) IntraMuscular every 6 hours PRN Agitation      I&O's Summary    24 Mar 2019 07:01  -  25 Mar 2019 07:00  --------------------------------------------------------  IN: 800 mL / OUT: 0 mL / NET: 800 mL    25 Mar 2019 07:01  -  25 Mar 2019 15:59  --------------------------------------------------------  IN: 350 mL / OUT: 0 mL / NET: 350 mL        Vital Signs Last 24 Hrs  T(C): 36.8 (25 Mar 2019 12:19), Max: 37.3 (25 Mar 2019 00:05)  T(F): 98.3 (25 Mar 2019 12:19), Max: 99.2 (25 Mar 2019 00:05)  HR: 92 (25 Mar 2019 13:04) (58 - 92)  BP: 132/89 (25 Mar 2019 13:04) (94/60 - 145/83)  BP(mean): --  RR: 18 (25 Mar 2019 12:19) (18 - 18)  SpO2: 96% (25 Mar 2019 12:19) (96% - 99%)    CAPILLARY BLOOD GLUCOSE          PHYSICAL EXAM:  GENERAL: NAD,   HEAD:  Atraumatic, Normocephalic  EYES: EOMI, PERRL, conjunctiva and sclera clear  NECK: No JVD  CHEST/LUNG: Clear to auscultation bilaterally; No wheeze  HEART: Regular rate and rhythm; No murmurs, rubs, or gallops  ABDOMEN: Soft, Nontender, Nondistended; Bowel sounds present  EXTREMITIES:  2+ Peripheral Pulses, No clubbing, cyanosis, or edema  PSYCH: AAOx3  NEUROLOGY: non-focal  SKIN: No rashes or lesions    LABS:                        8.9    3.35  )-----------( 66       ( 25 Mar 2019 08:58 )             28.5     Auto Eosinophil # x     / Auto Eosinophil % x     / Auto Neutrophil # x     / Auto Neutrophil % x     / BANDS % x                            8.5    4.37  )-----------( 69       ( 24 Mar 2019 09:59 )             27.8     Auto Eosinophil # x     / Auto Eosinophil % x     / Auto Neutrophil # x     / Auto Neutrophil % x     / BANDS % x        03-25    143  |  106  |  22  ----------------------------<  102<H>  4.1   |  23  |  2.66<H>  03-24    140  |  103  |  23  ----------------------------<  98  3.9   |  24  |  2.83<H>    Ca    8.9      25 Mar 2019 07:16  Mg     2.2     03-25  Phos  2.9     03-25    PT/INR - ( 25 Mar 2019 08:51 )   PT: 15.1 sec;   INR: 1.31 ratio         PTT - ( 25 Mar 2019 08:51 )  PTT:46.2 sec              RADIOLOGY & ADDITIONAL TESTS:    Imaging Personally Reviewed:    Consultant(s) Notes Reviewed:      Care Discussed with Consultants/Other Providers:

## 2019-03-25 NOTE — PROVIDER CONTACT NOTE (OTHER) - ASSESSMENT
Patient is A&Ox2 per hospital admission baseline. VSS. He c/o LLQ pain, cramping. Unable to rate on pain scale and describe pain in detail due to mentation. Patient observed to be grimacing, moaning, and restless in bed. Patient had 2 BMs last night, no nausea/vomiting.

## 2019-03-25 NOTE — PROGRESS NOTE ADULT - PROBLEM SELECTOR PLAN 6
H/O seizures  -c/w keppra Pt w/ hx of htn.   - coreg 12.5mg DC'd; Switched to Lopressor 25mg BID  - Isordil 10mg TID,   - hydralazine 10mg TID,   -hold torsemide 20mg daily

## 2019-03-26 NOTE — PROGRESS NOTE ADULT - PROBLEM SELECTOR PLAN 1
Abdominal pain and AMS 2/2 septic emboli. MR reveals occipital lobe stroke in the setting of endocarditis. Now c/b hemorrhagic conversion on MRI.   - Oxycodone prn for abd pain  -Endocarditis management as below

## 2019-03-26 NOTE — PROGRESS NOTE ADULT - PROBLEM SELECTOR PLAN 9
May be marrow suppression 2/2 sepsis.  Anemia possibly in setting of CKD. Past records showing normal cell cts acquired. Likely sepsis induced suppression.  - Parvo neg,CMV neg, EBV neg, HIV neg  -cbc qd

## 2019-03-26 NOTE — PROGRESS NOTE ADULT - ASSESSMENT
62 y/o M w/t PMHx of bioprosthetic AV & MV c/b endocarditis (on ceftriaxone and rifampin), hypothyroidism, BPH, history of seizures, and HTN, who presents to the hospital from rehab for suicidal ideations found to have AMS, JANEL, fever, all 2/2 endocarditis with suspected hemorrhagic septic emboli by MR. TORREZ conversation w/t brothers held, patient now DNR/DNI 3/23. Will continue with medical management for now as the patient refused doses of rifampin twice so cannot be ruled as treatment failure as of yet. TTE this AM demonstrating acute drop in EF to 16% w/t 1 long vegetation extending from the MV deep into the LV.

## 2019-03-26 NOTE — PROGRESS NOTE ADULT - PROBLEM SELECTOR PLAN 10
DVT: holding pharm ppx given bleeding risk given septic emboli. SCDs  Diet: DASH/Renal  Dispo: pending abx plan    Problem 11: Ectopy. Pt found to have many PVCs on tele.   - BB switched from carvedilol 12.5 to metoprolol and uptitrated to lopressor 25mg BID  - Will continue to monitor on tele.

## 2019-03-26 NOTE — PROGRESS NOTE ADULT - PROBLEM SELECTOR PLAN 6
Pt w/ hx of htn.   - C/w Lopressor 25mg BID  - Isordil 10mg TID,   - hydralazine 10mg TID,   - hold torsemide 20mg daily

## 2019-03-26 NOTE — PROGRESS NOTE ADULT - SUBJECTIVE AND OBJECTIVE BOX
CC: Patient is a 63y old  Male who presents with a chief complaint of JANEL (26 Mar 2019 07:34)    ID following for endocarditis    Interval History/ROS: Patient remains agitated this morning being asked questions. Has no complaints. Denies fever, chills.    Rest of ROS negative.    Allergies  penicillin (Swelling)    ANTIMICROBIALS:  cefepime   IVPB 1000 every 24 hours    OTHER MEDS:  atorvastatin 40 milliGRAM(s) Oral at bedtime  chlorhexidine 4% Liquid 1 Application(s) Topical every 12 hours  hydrALAZINE 10 milliGRAM(s) Oral three times a day  isosorbide   dinitrate Tablet (ISORDIL) 10 milliGRAM(s) Oral three times a day  levETIRAcetam 500 milliGRAM(s) Oral two times a day  levothyroxine 200 MICROGram(s) Oral daily  LORazepam   Injectable 1 milliGRAM(s) IntraMuscular every 6 hours PRN  melatonin 3 milliGRAM(s) Oral at bedtime  metoprolol tartrate 50 milliGRAM(s) Oral two times a day  senna 2 Tablet(s) Oral at bedtime  sodium bicarbonate 650 milliGRAM(s) Oral two times a day  tamsulosin 0.4 milliGRAM(s) Oral at bedtime    PE:    Vital Signs Last 24 Hrs  T(C): 36.6 (26 Mar 2019 08:05), Max: 37.3 (25 Mar 2019 20:02)  T(F): 97.9 (26 Mar 2019 08:05), Max: 99.2 (25 Mar 2019 20:02)  HR: 84 (26 Mar 2019 08:05) (62 - 108)  BP: 114/78 (26 Mar 2019 08:05) (114/78 - 162/92)  BP(mean): --  RR: 18 (26 Mar 2019 08:05) (18 - 18)  SpO2: 93% (26 Mar 2019 08:05) (93% - 99%)    Gen: Awake, NAD  CV: S1+S2 normal, no murmurs  Resp: Clear bilat, no resp distress  Abd: Soft, nontender, +BS  Ext: No LE edema, no wounds  : No Griffith  IV/Skin: No thrombophlebitis, picc intact  Neuro: no focal deficits    LABS:                          8.8    3.04  )-----------( 73       ( 26 Mar 2019 08:39 )             27.8       03-26    141  |  104  |  19  ----------------------------<  136<H>  3.8   |  23  |  2.50<H>    Ca    8.9      26 Mar 2019 05:58  Phos  2.4     03-26  Mg     2.0     03-26    MICROBIOLOGY:  Vancomycin Level, Trough: 16.2 ug/mL (03-26-19 @ 05:58)  v  .Blood Blood  03-22-19   Culture is being performed.  --  --      .Urine Catheterized  03-18-19   No growth  --  --      .Blood Blood-Peripheral  03-15-19   No growth at 5 days.  --  --    CMV IgG Antibody: 0.27 U/mL (03-17-19 @ 12:53)    RADIOLOGY:  < from: Xray Chest 1 View- PORTABLE-Urgent (03.18.19 @ 21:39) >  FINDINGS/  IMPRESSION:    Tip of the right PICC within SVC. Mild left basilar atelectasis.    No consolidation, pleural effusion or pneumothorax. No pulmonary edema.    The cardiac silhouette remains enlarged. Sternotomy, aortic and mitral   valve replacement. Mitral appendage occlusion.      < end of copied text >

## 2019-03-26 NOTE — PROGRESS NOTE ADULT - PROBLEM SELECTOR PLAN 2
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 TTE confirms 3cm vegetation on bioprosthetic MV. BCx NGTD x2. s/p CARLY showing linear echo density on MV. TTE showing new drop in EF to 16% and veg extending deep into the LV.  -Patient with persistent infection for > 7 days of abx therapy and persistent vegetation c/b emboli in spite of abx therapy so CT surgery on board.   -Per CT surg the patient has a hx of non-compliance which was why Radha did not perform surgery and CT surg is recommending medical management.  -CTS recs: to discuss new TTE findings  -ID recs: Will need 6 wks of abx via PICC  - Vanc trough in AM. Continue to dose Vanc by lvl  -1g Cefazolin Q8H  -hold rifampin 2/2 worsening renal fxn  -Brucella, Q fever, chlamidophila, bartonella all neg

## 2019-03-26 NOTE — PROGRESS NOTE ADULT - ASSESSMENT
63 year old male with recent admission for endocarditis discharged on ceftriaxone and rifampin via right arm picc, hypothyroidism, ?nephrectomy, BPH, history of seizures, and HTN who presents to the hospital from rehab for suicidal ideations.     At NH, he told someone from the staff that he wanted to kill himself so he was brought to the ED  s/p 2 heart valve replacements (Aortic and mitral valve), last replacement was 2 year ago     While at [place], found to have endocarditis on ceftriaxone and rifampin - was reported as culture negative endocarditis    Right arm picc   Now afebrile  TTE with prosthetic mitral valve with vegetation, has also prosthetic aortic valve  CT head/ MRI Head concerning for septic emboli, new areas of hemorrhage within the Right cerebellar hemisphere and right occipital lobe  Anemia, thrombocytopenia  Remains with JANEL slowly improving  CMV serologies negative  EBV serologies consistent with prior infection  HIV negative  CARLY with mitral valve vegetation that prolapses into the left ventricle  Vanco level supratherapeutic  ?Mycobacterium chimera - afb cultures sent    Recommend:  -Continue cefepime and vancomycin renally dosed  -Anticipate a 6-week course for endocarditis  -F/U AFB blood cultures    For Culture negative endocarditis, would F/U:  -Quant TB Gold pending  -Bartonella serologies negative  -Q fever serologies negative  -Legionella ab negative  -Brucella serologies negative  -Chlamydia serologies negative  -Treponema pallidum ab negative  -HIV negative

## 2019-03-26 NOTE — PROGRESS NOTE ADULT - SUBJECTIVE AND OBJECTIVE BOX
David Mo MD  PGY 1 Department of Internal Medicine  Pager: [phone]/ [phone]    Patient is a 63y old  Male who presents with a chief complaint of JANEL (25 Mar 2019 17:28)      SUBJECTIVE / OVERNIGHT EVENTS: Pt seen and examined. No acute overnight events. Pt reported no fever, chills, CP, SOB, abdominal pain, N/V/D, dysuria, or new joint aches.     MEDICATIONS  (STANDING):  atorvastatin 40 milliGRAM(s) Oral at bedtime  cefepime   IVPB 1000 milliGRAM(s) IV Intermittent every 24 hours  chlorhexidine 4% Liquid 1 Application(s) Topical every 12 hours  hydrALAZINE 10 milliGRAM(s) Oral three times a day  isosorbide   dinitrate Tablet (ISORDIL) 10 milliGRAM(s) Oral three times a day  levETIRAcetam 500 milliGRAM(s) Oral two times a day  levothyroxine 200 MICROGram(s) Oral daily  melatonin 3 milliGRAM(s) Oral at bedtime  metoprolol tartrate 25 milliGRAM(s) Oral every 12 hours  senna 2 Tablet(s) Oral at bedtime  sodium bicarbonate 650 milliGRAM(s) Oral two times a day  tamsulosin 0.4 milliGRAM(s) Oral at bedtime  vancomycin  IVPB 500 milliGRAM(s) IV Intermittent once    MEDICATIONS  (PRN):  LORazepam   Injectable 1 milliGRAM(s) IntraMuscular every 6 hours PRN Agitation      I&O's Summary    25 Mar 2019 07:01  -  26 Mar 2019 07:00  --------------------------------------------------------  IN: 400 mL / OUT: 0 mL / NET: 400 mL        Vital Signs Last 24 Hrs  T(C): 36.6 (26 Mar 2019 04:26), Max: 37.3 (25 Mar 2019 20:02)  T(F): 97.9 (26 Mar 2019 04:26), Max: 99.2 (25 Mar 2019 20:02)  HR: 92 (26 Mar 2019 04:55) (59 - 108)  BP: 127/82 (26 Mar 2019 04:55) (95/55 - 162/92)  BP(mean): --  RR: 18 (26 Mar 2019 04:26) (18 - 18)  SpO2: 95% (26 Mar 2019 04:26) (95% - 99%)    CAPILLARY BLOOD GLUCOSE          PHYSICAL EXAM:  GENERAL: NAD,   HEAD:  Atraumatic, Normocephalic  EYES: EOMI, PERRL, conjunctiva and sclera clear  NECK: No JVD  CHEST/LUNG: Clear to auscultation bilaterally; No wheeze  HEART: Regular rate and rhythm; No murmurs, rubs, or gallops  ABDOMEN: Soft, Nontender, Nondistended; Bowel sounds present  EXTREMITIES:  2+ Peripheral Pulses, No clubbing, cyanosis, or edema  PSYCH: AAOx3  NEUROLOGY: non-focal  SKIN: No rashes or lesions    LABS:                        8.9    3.35  )-----------( 66       ( 25 Mar 2019 08:58 )             28.5     Auto Eosinophil # x     / Auto Eosinophil % x     / Auto Neutrophil # x     / Auto Neutrophil % x     / BANDS % x                            8.5    4.37  )-----------( 69       ( 24 Mar 2019 09:59 )             27.8     Auto Eosinophil # x     / Auto Eosinophil % x     / Auto Neutrophil # x     / Auto Neutrophil % x     / BANDS % x        03-26    141  |  104  |  19  ----------------------------<  136<H>  3.8   |  23  |  2.50<H>  03-25    143  |  106  |  22  ----------------------------<  102<H>  4.1   |  23  |  2.66<H>    Ca    8.9      26 Mar 2019 05:58  Mg     2.0     03-26  Phos  2.4     03-26    PT/INR - ( 25 Mar 2019 08:51 )   PT: 15.1 sec;   INR: 1.31 ratio         PTT - ( 25 Mar 2019 08:51 )  PTT:46.2 sec              RADIOLOGY & ADDITIONAL TESTS:    Imaging Personally Reviewed:    Consultant(s) Notes Reviewed:      Care Discussed with Consultants/Other Providers:

## 2019-03-27 NOTE — CHART NOTE - NSCHARTNOTEFT_GEN_A_CORE
RRT response called on Mr. Linares this afternoon for hypotension to 60's systolic. Pt was found to be febrile this morning to 102, tachycardic and hypotensive throughout the day. Pt was pan cultured, ordered for IV tylenol, IV fluids and CXR earlier in the day, however RRT was called for worsening hypotension even after 250 cc bolus x 2. During RRT, patient's health care proxy and brother Mr. Alana Linares was contacted, who states that he would like pt to be comfortable without pressors or MICU. At this time, will continue with gentle hydration and await family to arrive at bedside.     Radha Machado MD PGY-3  Team [phone]

## 2019-03-27 NOTE — PROGRESS NOTE ADULT - ASSESSMENT
63 year old male with recent admission for endocarditis discharged on ceftriaxone and rifampin via right arm picc, hypothyroidism, ?nephrectomy, BPH, history of seizures, and HTN who presents to the hospital from rehab for suicidal ideations.     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    Right arm picc   Now afebrile  TTE with prosthetic mitral valve with vegetation, has also prosthetic aortic valve  CT head/ MRI Head concerning for septic emboli, new areas of hemorrhage within the Right cerebellar hemisphere and right occipital lobe  Anemia, thrombocytopenia  Remains with JANEL today worsened  CMV serologies negative  EBV serologies consistent with prior infection  HIV negative  CARLY with mitral valve vegetation that prolapses into the left ventricle  Vanco level supratherapeutic on hold  ?Mycobacterium chimera - afb cultures sent  RRT called for hypotension, febrile    Recommend:  -Continue to dose vancomycin renally dosed    -If within GOC would broaden antibiotics from cefepime to meropenem 500 mg IV q 24 hours  -Blood cultures x 2 sets  -UA/ urine culture

## 2019-03-27 NOTE — PROGRESS NOTE ADULT - SUBJECTIVE AND OBJECTIVE BOX
CC: Patient is a 63y old  Male who presents with a chief complaint of JANEL (26 Mar 2019 11:53)    ID following for endocarditis    Interval History/ROS: Patient had RRT for hypotension, febrile.     Rest of ROS negative.    Allergies  penicillin (Swelling)    ANTIMICROBIALS:  cefepime   IVPB 1000 every 24 hours    OTHER MEDS:  atorvastatin 40 milliGRAM(s) Oral at bedtime  chlorhexidine 4% Liquid 1 Application(s) Topical every 12 hours  isosorbide   dinitrate Tablet (ISORDIL) 10 milliGRAM(s) Oral three times a day  levETIRAcetam 500 milliGRAM(s) Oral two times a day  levothyroxine 200 MICROGram(s) Oral daily  LORazepam   Injectable 1 milliGRAM(s) IntraMuscular every 6 hours PRN  melatonin 3 milliGRAM(s) Oral at bedtime  senna 2 Tablet(s) Oral at bedtime  sodium bicarbonate 650 milliGRAM(s) Oral two times a day  sodium chloride 0.9% Bolus 500 milliLiter(s) IV Bolus once  tamsulosin 0.4 milliGRAM(s) Oral at bedtime    PE:    Vital Signs Last 24 Hrs  T(C): 37.7 (27 Mar 2019 13:19), Max: 38.9 (27 Mar 2019 10:31)  T(F): 99.8 (27 Mar 2019 13:19), Max: 102 (27 Mar 2019 10:31)  HR: 99 (27 Mar 2019 10:31) (62 - 116)  BP: 67/41 (27 Mar 2019 13:19) (67/41 - 119/55)  BP(mean): --  RR: 18 (27 Mar 2019 10:31) (17 - 18)  SpO2: 94% (27 Mar 2019 13:19) (94% - 98%)    Gen: Lethargic  CV: S1+S2 normal, no murmurs  Resp: Clear bilat, no resp distress  Abd: Soft, nontender, +BS  Ext: No LE edema, no wounds  : No Griffith  IV/Skin: No thrombophlebitis, picc intact  Neuro: no focal deficits      LABS:                          8.2    3.82  )-----------( 66       ( 27 Mar 2019 10:14 )             26.3       03-27    141  |  104  |  28<H>  ----------------------------<  124<H>  5.2   |  21<L>  |  4.84<H>    Ca    8.8      27 Mar 2019 09:25  Phos  2.9     03-27  Mg     1.8     03-27    MICROBIOLOGY:  Vancomycin Level, Trough: 21.3 ug/mL (03-27-19 @ 09:25)  v  .Blood Blood  03-22-19   Culture is being performed.  --  --      .Urine Catheterized  03-18-19   No growth  --  --      .Blood Blood-Peripheral  03-15-19   No growth at 5 days.  --  --    CMV IgG Antibody: 0.27 U/mL (03-17-19 @ 12:53)    RADIOLOGY:    < from: Xray Chest 1 View AP/PA (03.27.19 @ 12:04) >  FINDINGS/  IMPRESSION:    Right PICC tip within SVC. A status post a sternotomy, aortic and mitral   valve replacement. Watchman device is present.    No consolidation, pleural effusion or pneumothorax. No pulmonary edema.    The cardiac silhouette remains enlarged.    < end of copied text >

## 2019-03-27 NOTE — CHART NOTE - NSCHARTNOTEFT_GEN_A_CORE
Interval Hx:    Pt's Creatinine was elevated this AM (4.37). Repeat BMP confirmed elevation at Cr: 4.84. Bladder scan showed no evidence of obstruction. Pt was straight cathed and put out ~100cc of urine. Urine lytes sent. FeNa: 0.2% consistent w/ pre-renal etiology. Vanc trough: 21.3.     Nursing reported new fever 102 F (axillary); HR: 99 BP 76/51       Physical Exam:  Vital Signs Last 24 Hrs  T(C): 38.9 (27 Mar 2019 10:31), Max: 38.9 (27 Mar 2019 10:31)  T(F): 102 (27 Mar 2019 10:31), Max: 102 (27 Mar 2019 10:31)  HR: 99 (27 Mar 2019 10:31) (60 - 116)  BP: 80/50 (27 Mar 2019 10:39) (76/51 - 138/84)  BP(mean): --  RR: 18 (27 Mar 2019 10:31) (17 - 18)  SpO2: 94% (27 Mar 2019 10:31) (94% - 98%)     General: Lethargic, however arousable  CHEST: b/l crackles in lower lung fields  Cardiac: RRR, sys murmur  Abd: suprapubic tenderness, no distension, masses, HSM. + BS  EXT: No edema, cyanosis  Neuro: A&Ox0, moving all ext spontaneously     LABS:                        8.2    3.82  )-----------( 66       ( 27 Mar 2019 10:14 )             26.3     Auto Eosinophil # x     / Auto Eosinophil % x     / Auto Neutrophil # x     / Auto Neutrophil % x     / BANDS % x                            8.8    3.04  )-----------( 73       ( 26 Mar 2019 08:39 )             27.8     Auto Eosinophil # 0.17  / Auto Eosinophil % 5.6   / Auto Neutrophil # 0.33  / Auto Neutrophil % 11.0  / BANDS % x        03-27    141  |  104  |  28<H>  ----------------------------<  124<H>  5.2   |  21<L>  |  4.84<H>  03-27    141  |  105  |  26<H>  ----------------------------<  118<H>  5.0   |  23  |  4.37<H>  03-26    141  |  104  |  19  ----------------------------<  136<H>  3.8   |  23  |  2.50<H>    Ca    8.8      27 Mar 2019 09:25  Mg     1.8     03-27  Phos  2.9     03-27    Assessment and Plan:    4 y/o M w/t PMHx of bioprosthetic AV & MV c/b endocarditis (on ceftriaxone and rifampin), hypothyroidism, BPH, history of seizures, and HTN, who presents to the hospital from rehab for suicidal ideations found to have AMS, JANEL, fever, all 2/2 endocarditis with suspected hemorrhagic septic emboli by MR; hospital course now complicated by JANEL, fever, tachycardia concerning for severe sepsis.     #Severe Sepsis  - 250CC NS Bolus given poor EF (14%)  - Blood cxs x2  - CXR  - UA  - F/u ID recs    #JANEL  - Likely pre-renal etiology given sepsis, however can also be attributed to intrinsic etiology given elevated vanc trough.   - FeNA: 0.2%; Vanc trough 21.3  - Hold vanc today; Vanc trough in AM  - Will trend BMP; expect improvement in Cr after fluid bolus  - Consider nephro consult if JANEL worsens David Mo MD  PGY 1 Department of Internal Medicine  Pager: [phone]/ [phone]    Interval Hx:    Pt's Creatinine was elevated this AM (4.37). Repeat BMP confirmed elevation at Cr: 4.84. Bladder scan showed no evidence of obstruction. Pt was straight cathed and put out ~100cc of urine. Urine lytes sent. FeNa: 0.2% consistent w/ pre-renal etiology. Vanc trough: 21.3.     Nursing reported new fever 102 F (axillary); HR: 99 BP 76/51       Physical Exam:  Vital Signs Last 24 Hrs  T(C): 38.9 (27 Mar 2019 10:31), Max: 38.9 (27 Mar 2019 10:31)  T(F): 102 (27 Mar 2019 10:31), Max: 102 (27 Mar 2019 10:31)  HR: 99 (27 Mar 2019 10:31) (60 - 116)  BP: 80/50 (27 Mar 2019 10:39) (76/51 - 138/84)  BP(mean): --  RR: 18 (27 Mar 2019 10:31) (17 - 18)  SpO2: 94% (27 Mar 2019 10:31) (94% - 98%)     General: Lethargic, however arousable  CHEST: b/l crackles in lower lung fields  Cardiac: RRR, sys murmur  Abd: suprapubic tenderness, no distension, masses, HSM. + BS  EXT: No edema, cyanosis  Neuro: A&Ox0, moving all ext spontaneously     LABS:                        8.2    3.82  )-----------( 66       ( 27 Mar 2019 10:14 )             26.3     Auto Eosinophil # x     / Auto Eosinophil % x     / Auto Neutrophil # x     / Auto Neutrophil % x     / BANDS % x                            8.8    3.04  )-----------( 73       ( 26 Mar 2019 08:39 )             27.8     Auto Eosinophil # 0.17  / Auto Eosinophil % 5.6   / Auto Neutrophil # 0.33  / Auto Neutrophil % 11.0  / BANDS % x        03-27    141  |  104  |  28<H>  ----------------------------<  124<H>  5.2   |  21<L>  |  4.84<H>  03-27    141  |  105  |  26<H>  ----------------------------<  118<H>  5.0   |  23  |  4.37<H>  03-26    141  |  104  |  19  ----------------------------<  136<H>  3.8   |  23  |  2.50<H>    Ca    8.8      27 Mar 2019 09:25  Mg     1.8     03-27  Phos  2.9     03-27    Assessment and Plan:    4 y/o M w/t PMHx of bioprosthetic AV & MV c/b endocarditis (on ceftriaxone and rifampin), hypothyroidism, BPH, history of seizures, and HTN, who presents to the hospital from rehab for suicidal ideations found to have AMS, JANEL, fever, all 2/2 endocarditis with suspected hemorrhagic septic emboli by MR; hospital course now complicated by JANEL, fever, tachycardia concerning for severe sepsis.     #Severe Sepsis  - 250CC NS Bolus given poor EF (14%)  - Blood cxs x2  - CXR  - UA  - F/u ID recs    #JANEL  - Likely pre-renal etiology given sepsis, however can also be attributed to intrinsic etiology given elevated vanc trough.   - FeNA: 0.2%; Vanc trough 21.3  - Hold vanc today; Vanc trough in AM  - Will trend BMP; expect improvement in Cr after fluid bolus  - Consider nephro consult if JANEL worsens David Mo MD  PGY 1 Department of Internal Medicine  Pager: [phone]/ [phone]    Interval Hx:    Pt's Creatinine was elevated this AM (4.37). Repeat BMP confirmed elevation at Cr: 4.84. Bladder scan showed no evidence of obstruction. Pt was straight cathed and put out ~100cc of urine. Urine lytes sent. FeNa: 0.2% consistent w/ pre-renal etiology. Vanc trough: 21.3.     Nursing reported new fever 102 F (axillary); HR: 99 BP 76/51       Physical Exam:  Vital Signs Last 24 Hrs  T(C): 38.9 (27 Mar 2019 10:31), Max: 38.9 (27 Mar 2019 10:31)  T(F): 102 (27 Mar 2019 10:31), Max: 102 (27 Mar 2019 10:31)  HR: 99 (27 Mar 2019 10:31) (60 - 116)  BP: 80/50 (27 Mar 2019 10:39) (76/51 - 138/84)  BP(mean): --  RR: 18 (27 Mar 2019 10:31) (17 - 18)  SpO2: 94% (27 Mar 2019 10:31) (94% - 98%)     General: Lethargic, however arousable  CHEST: b/l crackles in lower lung fields  Cardiac: RRR, sys murmur  Abd: suprapubic tenderness, no distension, masses, HSM. + BS  EXT: No edema, cyanosis  Neuro: A&Ox0, moving all ext spontaneously     LABS:                        8.2    3.82  )-----------( 66       ( 27 Mar 2019 10:14 )             26.3     Auto Eosinophil # x     / Auto Eosinophil % x     / Auto Neutrophil # x     / Auto Neutrophil % x     / BANDS % x                            8.8    3.04  )-----------( 73       ( 26 Mar 2019 08:39 )             27.8     Auto Eosinophil # 0.17  / Auto Eosinophil % 5.6   / Auto Neutrophil # 0.33  / Auto Neutrophil % 11.0  / BANDS % x        03-27    141  |  104  |  28<H>  ----------------------------<  124<H>  5.2   |  21<L>  |  4.84<H>  03-27    141  |  105  |  26<H>  ----------------------------<  118<H>  5.0   |  23  |  4.37<H>  03-26    141  |  104  |  19  ----------------------------<  136<H>  3.8   |  23  |  2.50<H>    Ca    8.8      27 Mar 2019 09:25  Mg     1.8     03-27  Phos  2.9     03-27    Assessment and Plan:    62 y/o M w/t PMHx of bioprosthetic AV & MV c/b endocarditis (on ceftriaxone and rifampin), hypothyroidism, BPH, history of seizures, and HTN, who presents to the hospital from rehab for suicidal ideations found to have AMS, JANEL, fever, all 2/2 endocarditis with suspected hemorrhagic septic emboli by MR; hospital course now complicated by JANEL, fever, tachycardia concerning for severe sepsis.     #Severe Sepsis  - 250CC NS Bolus given poor EF (14%)  - Blood cxs x2  - CXR  - UA  - F/u ID recs    #JANEL  - Likely pre-renal etiology given sepsis, however can also be attributed to intrinsic etiology given elevated vanc trough.   - FeNA: 0.2%; Vanc trough 21.3  - Hold vanc today; Vanc trough in AM  - Will trend BMP; expect improvement in Cr after fluid bolus  - Consider nephro consult if JANEL worsens

## 2019-03-28 NOTE — PROGRESS NOTE ADULT - PROBLEM SELECTOR PLAN 3
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 TTE confirms 3cm vegetation on bioprosthetic MV. BCx NGTD x2. s/p CARLY showing linear echo density on MV. TTE showing new drop in EF to 16% and veg extending deep into the LV.  -Patient with persistent infection for > 7 days of abx therapy and persistent vegetation c/b emboli in spite of abx therapy so CT surgery on board.   -Per CT surg the patient has a hx of non-compliance which was why Radha did not perform surgery and CT surg is recommending medical management.  -CTS recs: to discuss new TTE findings  -ID recs: Will need 6 wks of abx via PICC  - Vanc trough in AM. 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

## 2019-03-28 NOTE — CONSULT NOTE ADULT - SUBJECTIVE AND OBJECTIVE BOX
HPI:  62 y/o M with bioprosthetic AV & MV c/b endocarditis (on ceftriaxone and rifampin), hypothyroidism, BPH, history of seizures, and HTN, who presents to the hospital from rehab for suicidal ideations found to have AMS, JANEL, fever, all 2/2 endocarditis with suspected hemorrhagic septic emboli by MRI.  Hospital course now complicated by JANEL, fever, tachycardia concerning for severe sepsis. Palliative care was consulted since "... family opted for comfort care yesterday during an RRT. Request for possible transport to palliative care unit for pain control..."     PERTINENT PM/SXH:   Hypothyroidism  Congestive heart failure (CHF)  Hypertension  Seizures  BPH (benign prostatic hypertrophy)    S/p nephrectomy  History of heart valve replacement  No significant past surgical history    FAMILY HISTORY:  No pertinent family history in first degree relatives    ITEMS NOT CHECKED ARE NOT PRESENT    SOCIAL HISTORY:   Significant other/partner:  [ ]  Children:  [ ]  Lutheran/Spirituality:  Substance hx:  [ ]   Tobacco hx:  [ ]   Alcohol hx: [ ]   Home Opioid hx:  [ ] I-Stop Reference No:  Living Situation: [ ]Home  [ ]Long term care  [ ]Rehab [ ]Other  Per care coordination notes: " Per H&P pt sent in from  rehab  [facility] for suicidal ideations. Per nursing home report pt non  compliant with meds since entering rehab, per rehab staff pt reports he wants  to kill himself. Per H&P pt reports he was upset about his [facility] admission,  due to issues with food. Pt believes his food was contaminated. Psych CL  consulted. Per NP Kayleen pt denies suicide ideation plan/intent, psych reccs  delirium work up. [facility] made outreach to [facility] staff requesting transfer  faxed. [facility] notified family pt in hospital.    Social work met with pt at bedside introduced role. Pt alert, oriented to  person, place, situation with some confusion. Pt resides in [place]. Pt  reports his sister and brother live out of state are supportive. [facility] explored  insurance, status pta, pt requested social work talk to his brother. Pt  identified his brother Renee as emergency contact [phone] ph. Pt unable  to identify caregiver at this time. Pt reports [facility] Pt reports he believes [facility] was  contaminating his food. Pt reports he does not feel [facility] are treating him  well because [facility] told them he was trying to hurt himself. Pt  denies suicide ideation/ homicide ideation. Pt denies alcohol use, denies  illicit substance abuse. LMSW made outreach to Reene brother with pt consent  left voicemail requested call back void PHI"     "Previous admission to [facility] prior  to [facility], noted to be MEDICAID PENDING status."     ADVANCE DIRECTIVES:    DNR  Yes  MOLST  [ ]  Living Will  [ ]   DECISION MAKER(s):  [x ] Health Care Proxy(s)  [ ] Surrogate(s)  [ ] Guardian           Name(s): Phone Number(s): Mr. Alana Linares [phone]    BASELINE (I)ADL(s) (prior to admission):  [place]: [ ]Total  [ ] Moderate [ ]Dependent    Allergies    penicillin (Swelling)    Intolerances    MEDICATIONS  (STANDING):  atorvastatin 40 milliGRAM(s) Oral at bedtime  chlorhexidine 4% Liquid 1 Application(s) Topical every 12 hours  isosorbide   dinitrate Tablet (ISORDIL) 10 milliGRAM(s) Oral three times a day  levETIRAcetam 500 milliGRAM(s) Oral two times a day  levothyroxine 200 MICROGram(s) Oral daily  melatonin 3 milliGRAM(s) Oral at bedtime  meropenem  IVPB 500 milliGRAM(s) IV Intermittent every 24 hours  senna 2 Tablet(s) Oral at bedtime  sodium bicarbonate 650 milliGRAM(s) Oral two times a day  tamsulosin 0.4 milliGRAM(s) Oral at bedtime    MEDICATIONS  (PRN):  LORazepam   Injectable 1 milliGRAM(s) IntraMuscular every 6 hours PRN Agitation  morphine  - Injectable 1 milliGRAM(s) IV Push every 3 hours PRN Air hunger    PRESENT SYMPTOMS: [ ]Unable to obtain due to poor mentation   Source if other than patient:  [ ]Family   [ ]Team     Pain (Impact on QOL):    Location -         Minimal acceptable level (0-10 scale):                    Aggravating factors -  Quality -  Radiation -  Severity (0-10 scale) -    Timing -    PAIN AD Score:     [email or web address] (press ctrl +  left click to view)    Dyspnea:                           [ ]Mild [ ]Moderate [ ]Severe  Anxiety:                             [ ]Mild [ ]Moderate [ ]Severe  Fatigue:                             [ ]Mild [ ]Moderate [ ]Severe  Nausea:                             [ ]Mild [ ]Moderate [ ]Severe  Loss of appetite:              [ ]Mild [ ]Moderate [ ]Severe  Constipation:                    [ ]Mild [ ]Moderate [ ]Severe    Other Symptoms:  [ ]All other review of systems negative     Karnofsky Performance Score/Palliative Performance Status Version 2:         %    http://palliative.info/resource_material/[email or web address].pdf  PHYSICAL EXAM:  Vital Signs Last 24 Hrs  T(C): 38.1 (28 Mar 2019 08:08), Max: 38.1 (28 Mar 2019 08:08)  T(F): 100.5 (28 Mar 2019 08:08), Max: 100.5 (28 Mar 2019 08:08)  HR: 82 (28 Mar 2019 08:08) (54 - 101)  BP: 82/52 (28 Mar 2019 08:08) (67/41 - 96/60)  BP(mean): --  RR: 18 (28 Mar 2019 08:08) (18 - 19)  SpO2: 97% (28 Mar 2019 08:08) (94% - 100%) I&O's Summary    27 Mar 2019 07:01  -  28 Mar 2019 07:00  --------------------------------------------------------  IN: 250 mL / OUT: 100 mL / NET: 150 mL    GENERAL:  [ ]Alert  [ ]Oriented x   [ ]Lethargic  [ ]Cachexia  [ ]Unarousable  [ ]Verbal  [ ]Non-Verbal  Behavioral:   [ ] Anxiety  [ ] Delirium [ ] Agitation [ ] Other  HEENT:  [ ]Normal   [ ]Dry mouth   [ ]ET Tube/Trach  [ ]Oral lesions  PULMONARY:   [ ]Clear [ ]Tachypnea  [ ]Audible excessive secretions   [ ]Rhonchi        [ ]Right [ ]Left [ ]Bilateral  [ ]Crackles        [ ]Right [ ]Left [ ]Bilateral  [ ]Wheezing     [ ]Right [ ]Left [ ]Bilateral  CARDIOVASCULAR:    [ ]Regular [ ]Irregular [ ]Tachy  [ ]Bolivar [ ]Murmur [ ]Other  GASTROINTESTINAL:  [ ]Soft  [ ]Distended   [ ]+BS  [ ]Non tender [ ]Tender  [ ]PEG [ ]OGT/ NGT  Last BM:   GENITOURINARY:  [ ]Normal [ ] Incontinent   [ ]Oliguria/Anuria   [ ]Griffith  MUSCULOSKELETAL:   [ ]Normal   [ ]Weakness  [ ]Bed/Wheelchair bound [ ]Edema  NEUROLOGIC:   [ ]No focal deficits  [ ] Cognitive impairment  [ ] Dysphagia [ ]Dysarthria [ ] Paresis [ ]Other   SKIN:   [ ]Normal   [ ]Pressure ulcer(s)  [ ]Rash    CRITICAL CARE:  [ ] Shock Present  [ ]Septic [ ]Cardiogenic [ ]Neurologic [ ]Hypovolemic  [ ]  Vasopressors [ ]  Inotropes   [ ] Respiratory failure present  [ ] Acute  [ ] Chronic [ ] Hypoxic  [ ] Hypercarbic [ ] Other  [ ] Other organ failure     LABS:                        8.2    3.82  )-----------( 66       ( 27 Mar 2019 10:14 )             26.3       142  |  105  |  41<H>  ----------------------------<  100<H>  5.8<H>   |  22  |  6.57<H>    Ca    8.3<L>      28 Mar 2019 06:53  Phos  2.9       Mg     1.8         TPro  6.5  /  Alb  2.6<L>  /  TBili  0.6  /  DBili  x   /  AST  37  /  ALT  10  /  AlkPhos  70        Urinalysis Basic - ( 27 Mar 2019 18:55 )    Color: Yulia / Appearance: Turbid / S.026 / pH: x  Gluc: x / Ketone: Small  / Bili: Negative / Urobili: <2 mg/dL   Blood: x / Protein: >600 mg/dL / Nitrite: Negative   Leuk Esterase: Negative / RBC: 3 /HPF /  /HPF   Sq Epi: x / Non Sq Epi: 11 /HPF / Bacteria: Few      RADIOLOGY & ADDITIONAL STUDIES:  < from: MR Head No Cont (19 @ 13:14) >    EXAM:  MR BRAIN                          EXAM:  MR ANGIO NECK                          EXAM:  MR ANGIO BRAIN                            PROCEDURE DATE:  2019  IMPRESSION:    Multiple focal areas of restricted diffusion suggesting new areas of   hemorrhage within the right cerebellar hemisphere and within the right   occipital lobe, with hemorrhagic transformation in the right occipital   lobe.    Tortuous extracranial vessels likely reflecting hypertension with   stenoses of the distal middle and posterior cerebral artery branch   vasculature. No  ICA origin hemodynamically significant stenosis by   NASCET criteria.    Findings discussed with Dr. Villalobos at immediate time of review on   3/17/2019 at 1:19 PM.    < end of copied text >    < from: CT Abdomen and Pelvis w/ Oral Cont (03.15.19 @ 13:24) >  EXAM:  CT ABDOMEN AND PELVIS OC                          EXAM:  CT CHEST OC                            PROCEDURE DATE:  03/15/2019    IMPRESSION:   Subsegmental atelectasis versus pneumonia in the left lower lobe.     No acute intra-abdominal pathology.                        MAGGY BRIDGES M.D., ATTENDING RADIOLOGIST  This document has been electronically signed. Mar 15 2019  1:35PM    < end of copied text >    < from: Transthoracic Echocardiogram (19 @ 15:12) >  Patient name: CARSON LINARES  YOB: 1955   Age: 63 (M)   MR#: [id number]  Study Date: 3/24/2019  Location: [address] [place], [place] [id number]: [facility] Cresp[facility] 14 %Conclusions:  1. Bioprosthetic mitral valve replacement.  Multiple mobile  echodensities are visualized in association with the  prosthetic mitral valve leaflets and are consistent with  vegetations.  One particularly long echodensity extends  deep into the LV cavity during the cardiac cycle. Minimal  mitral regurgitation. Mean transmitral valve gradient  equals 5 mm Hg, which is probably normal in the setting of  a bioprosthetic mitral valve replacement.  2. Bioprosthetic aortic valve replacement, which is well  seated with normal leaflet motion. Peak transaortic valve  gradient equals 9 mm Hg, mean transaortic valve gradient  equals 6 mm Hg, which is probably normal in the presence of  a bioprosthetic aortic valve. No aortic valve regurgitation  seen.  3. Moderately dilated left atrium.  LA volume index = 45  cc/m2.  4.Mild left ventricular enlargement.  5. Severe global left ventricular systolic dysfunction.  6. Right ventricular enlargement with decreased right  ventricular systolic function.  *** Compared with echocardiogram ([facility]) of 3/18/2019, the  findings aresimilar on the current study.  ------------------------------------------------------------------------  Confirmed on  3/24/2019 - 12:06:33 by Ruperto Seals M.D.  ------------------------------------------------------------------------    < end of copied text >    PROTEIN CALORIE MALNUTRITION PRESENT: [ ] Yes [ ] No  [ ] PPSV2 < or = to 30% [ ] significant weight loss  [ ] poor nutritional intake [ ] catabolic state [ ] anasarca     Albumin, Serum: 2.6 g/dL (19 @ 06:53)  Artificial Nutrition [ ]     REFERRALS:   [ ]Chaplaincy  [ ] Hospice  [ ]Child Life  [ ]Social Work  [ ]Case management [ ]Holistic Therapy   Goals of Care Discussion Document: HPI:  64 y/o M with bioprosthetic AV & MV c/b endocarditis (on ceftriaxone and rifampin), hypothyroidism, BPH, history of seizures, and HTN, who presents to the hospital from rehab for suicidal ideations found to have AMS, JANEL, fever, all 2/2 endocarditis with suspected hemorrhagic septic emboli by MRI.  Hospital course now complicated by JANEL, fever, tachycardia concerning for severe sepsis. Palliative care was consulted since "... family opted for comfort care yesterday during an RRT. Request for possible transport to palliative care unit for pain control..."     The patient was seen and examined with his brother by his bedside. He was non verbal and moaning intermittently. He also appeared to be having mild labored breathing.      PERTINENT PM/SXH:   Hypothyroidism  Congestive heart failure (CHF)  Hypertension  Seizures  BPH (benign prostatic hypertrophy)    S/p nephrectomy  History of heart valve replacement  No significant past surgical history    FAMILY HISTORY:  No pertinent family history in first degree relatives    ITEMS NOT CHECKED ARE NOT PRESENT    SOCIAL HISTORY:   Significant other/partner: Single [ ]  Children: No  [ ]  Denominational/Spirituality: Christian   Substance hx:  [ ]   Tobacco hx:  [ ]   Alcohol hx: [ ]   Home Opioid hx:  [ ] I-Stop Reference No:  Living Situation: [ ]Home  [ ]Long term care  [ x]Rehab [ ]Other  Per care coordination notes: " Per H&P pt sent in from  rehab  [facility] for suicidal ideations. Per nursing home report pt non  compliant with meds since entering rehab, per rehab staff pt reports he wants  to kill himself. Per H&P pt reports he was upset about his [facility] admission,  due to issues with food. Pt believes his food was contaminated. Psych CL  consulted. Per NP Kayleen pt denies suicide ideation plan/intent, psych reccs  delirium work up. [facility] [place] [facility] outreach to [facility] staff requesting transfer  faxed. [facility] notified family pt in hospital.    Social work met with pt at bedside introduced role. Pt alert, oriented to  person, place, situation with some confusion. Pt resides in [place]. Pt  reports his sister and brother live out of state are supportive. SW explored  insurance, status pta, pt requested social work talk to his brother. Pt  identified his brother Renee as emergency contact [phone] ph. Pt unable  to identify caregiver at this time. Pt reports he does [facility] Pt reports he believes [facility] was  contaminating his food. Pt reports he does not feel [facility] are treating him  well because [facility] told them he was trying to hurt himself. Pt  denies suicide ideation/ homicide ideation. Pt denies alcohol use, denies  illicit substance abuse. [facility] [place] [facility] made outreach to Renee brother with pt consent  left voicemail requested call back void PHI"     "Previous admission to [facility] prior  to [facility], noted to be MEDICAID PENDING status."     ADVANCE DIRECTIVES:    DNR  Yes  MOLST  [ ]  Living Will  [ ]   DECISION MAKER(s):  [ ] Health Care Proxy(s)  [x ] Surrogate(s)  [ ] Guardian           Name(s): Phone Number(s): The patient has 3 sisters and 2 brothers. The primary team and myself were able to called and talked to Elmira ([phone]), Koby ([phone]), Shaneka ([phone]). There was not a way to leave a message for Margarita. The siblings that were able to be reached out, agree on Mr. Alana Linares ([phone]), the patient's brother, being the main decision maker.       BASELINE (I)ADL(s) (prior to admission):  [place]: [ ]Total  [x ] Moderate [ ]Dependent    Allergies    penicillin (Swelling)    Intolerances    MEDICATIONS  (STANDING):  atorvastatin 40 milliGRAM(s) Oral at bedtime  chlorhexidine 4% Liquid 1 Application(s) Topical every 12 hours  isosorbide   dinitrate Tablet (ISORDIL) 10 milliGRAM(s) Oral three times a day  levETIRAcetam 500 milliGRAM(s) Oral two times a day  levothyroxine 200 MICROGram(s) Oral daily  melatonin 3 milliGRAM(s) Oral at bedtime  meropenem  IVPB 500 milliGRAM(s) IV Intermittent every 24 hours  senna 2 Tablet(s) Oral at bedtime  sodium bicarbonate 650 milliGRAM(s) Oral two times a day  tamsulosin 0.4 milliGRAM(s) Oral at bedtime    MEDICATIONS  (PRN):  LORazepam   Injectable 1 milliGRAM(s) IntraMuscular every 6 hours PRN Agitation  morphine  - Injectable 1 milliGRAM(s) IV Push every 3 hours PRN Air hunger    PRESENT SYMPTOMS: [ x]Unable to obtain due to poor mentation   Source if other than patient:  [ ]Family   [ ]Team     Pain (Impact on QOL):    Location -         Minimal acceptable level (0-10 scale):                    Aggravating factors -  Quality -  Radiation -  Severity (0-10 scale) -    Timing -    PAIN AD Score: 6    [email or web address] (press ctrl +  left click to view)    Dyspnea:                           [ ]Mild [ ]Moderate [ ]Severe  Anxiety:                             [ ]Mild [ ]Moderate [ ]Severe  Fatigue:                             [ ]Mild [ ]Moderate [ ]Severe  Nausea:                             [ ]Mild [ ]Moderate [ ]Severe  Loss of appetite:              [ ]Mild [ ]Moderate [ ]Severe  Constipation:                    [ ]Mild [ ]Moderate [ ]Severe    Other Symptoms:  [ ]All other review of systems negative     Karnofsky Performance Score/Palliative Performance Status Version 2:   20      %    http://palliative.info/resource_material/PPSv2.pdf  PHYSICAL EXAM:  Vital Signs Last 24 Hrs  T(C): 38.1 (28 Mar 2019 08:08), Max: 38.1 (28 Mar 2019 08:08)  T(F): 100.5 (28 Mar 2019 08:08), Max: 100.5 (28 Mar 2019 08:08)  HR: 82 (28 Mar 2019 08:08) (54 - 101)  BP: 82/52 (28 Mar 2019 08:08) (67/41 - 96/60)  BP(mean): --  RR: 18 (28 Mar 2019 08:08) (18 - 19)  SpO2: 97% (28 Mar 2019 08:08) (94% - 100%) I&O's Summary    27 Mar 2019 07:01  -  28 Mar 2019 07:00  --------------------------------------------------------  IN: 250 mL / OUT: 100 mL / NET: 150 mL    GENERAL:  [ ]Alert  [ ]Oriented x   [ x]Lethargic  [ ]Cachexia  [ ]Unarousable  [ ]Verbal  [x ]Non-Verbal [x] Ill appearing male.   Behavioral:   [ ] Anxiety  [ ] Delirium [ ] Agitation [x ] Other: Encephalopathic   HEENT:  [x ]Normal   [ ]Dry mouth   [ ]ET Tube/Trach  [ ]Oral lesions  PULMONARY:   [x ]Clear [ ]Tachypnea  [ ]Audible excessive secretions   [ ]Rhonchi        [ ]Right [ ]Left [ ]Bilateral  [ ]Crackles        [ ]Right [ ]Left [ ]Bilateral  [ ]Wheezing     [ ]Right [ ]Left [ ]Bilateral  CARDIOVASCULAR:    [x ]Regular [ ]Irregular [ ]Tachy  [ ]Bolivar [ ]Murmur [ ]Other  GASTROINTESTINAL:  [x ]Soft  [ ]Distended   [x ]+BS  [ ]Non tender [ ]Tender  [ ]PEG [ ]OGT/ NGT  Last BM: 3/25  GENITOURINARY:  [ ]Normal [x ] Incontinent   [ ]Oliguria/Anuria   [ ]Griffith  MUSCULOSKELETAL:   [ ]Normal   [x ]Weakness  [ ]Bed/Wheelchair bound [ ]Edema  NEUROLOGIC:   [ ]No focal deficits  [x ] Cognitive impairment  [ ] Dysphagia [ ]Dysarthria [ ] Paresis [ ]Other   SKIN:   [x] Normal   [ ]Pressure ulcer(s)  [ ]Rash    CRITICAL CARE:  [ ] Shock Present  [ ]Septic [ ]Cardiogenic [ ]Neurologic [ ]Hypovolemic  [ ]  Vasopressors [ ]  Inotropes   [ ] Respiratory failure present  [ ] Acute  [ ] Chronic [ ] Hypoxic  [ ] Hypercarbic [ ] Other  [ ] Other organ failure     LABS:                        8.2    3.82  )-----------( 66       ( 27 Mar 2019 10:14 )             26.3       142  |  105  |  41<H>  ----------------------------<  100<H>  5.8<H>   |  22  |  6.57<H>    Ca    8.3<L>      28 Mar 2019 06:53  Phos  2.9       Mg     1.8         TPro  6.5  /  Alb  2.6<L>  /  TBili  0.6  /  DBili  x   /  AST  37  /  ALT  10  /  AlkPhos  70        Urinalysis Basic - ( 27 Mar 2019 18:55 )    Color: Yulia / Appearance: Turbid / S.026 / pH: x  Gluc: x / Ketone: Small  / Bili: Negative / Urobili: <2 mg/dL   Blood: x / Protein: >600 mg/dL / Nitrite: Negative   Leuk Esterase: Negative / RBC: 3 /HPF /  /HPF   Sq Epi: x / Non Sq Epi: 11 /HPF / Bacteria: Few      RADIOLOGY & ADDITIONAL STUDIES:  < from: MR Head No Cont (19 @ 13:14) >    EXAM:  MR BRAIN                          EXAM:  MR ANGIO NECK                          EXAM:  MR ANGIO BRAIN                            PROCEDURE DATE:  2019  IMPRESSION:    Multiple focal areas of restricted diffusion suggesting new areas of   hemorrhage within the right cerebellar hemisphere and within the right   occipital lobe, with hemorrhagic transformation in the right occipital   lobe.    Tortuous extracranial vessels likely reflecting hypertension with   stenoses of the distal middle and posterior cerebral artery branch   vasculature. No  ICA origin hemodynamically significant stenosis by   NASCET criteria.    Findings discussed with Dr. Villalobos at immediate time of review on   3/17/2019 at 1:19 PM.    < end of copied text >    < from: CT Abdomen and Pelvis w/ Oral Cont (03.15.19 @ 13:24) >  EXAM:  CT ABDOMEN AND PELVIS OC                          EXAM:  CT CHEST OC                            PROCEDURE DATE:  03/15/2019    IMPRESSION:   Subsegmental atelectasis versus pneumonia in the left lower lobe.     No acute intra-abdominal pathology.                        MAGGY BRIDGES M.D., ATTENDING RADIOLOGIST  This document has been electronically signed. Mar 15 2019  1:35PM    < end of copied text >    < from: Transthoracic Echocardiogram (19 @ 15:12) >  Patient name: CARSON LINARES  YOB: 1955   Age: 63 (M)   MR#: [id number]  Study Date: 3/24/2019  Location: [address] [place], [place] [id number]: Donny Crespo RDCSEF ([place]): 14 %Conclusions:  1. Bioprosthetic mitral valve replacement.  Multiple mobile  echodensities are visualized in association with the  prosthetic mitral valve leaflets and are consistent with  vegetations.  One particularly long echodensity extends  deep into the LV cavity during the cardiac cycle. Minimal  mitral regurgitation. Mean transmitral valve gradient  equals 5 mm Hg, which is probably normal in the setting of  a bioprosthetic mitral valve replacement.  2. Bioprosthetic aortic valve replacement, which is well  seated with normal leaflet motion. Peak transaortic valve  gradient equals 9 mm Hg, mean transaortic valve gradient  equals 6 mm Hg, which is probably normal in the presence of  a bioprosthetic aortic valve. No aortic valve regurgitation  seen.  3. Moderately dilated left atrium.  LA volume index = 45  cc/m2.  4.Mild left ventricular enlargement.  5. Severe global left ventricular systolic dysfunction.  6. Right ventricular enlargement with decreased right  ventricular systolic function.  *** Compared with echocardiogram ([facility]) of 3/18/2019, the  findings aresimilar on the current study.  ------------------------------------------------------------------------  Confirmed on  3/24/2019 - 12:06:33 by Ruperto Seals M.D.  ------------------------------------------------------------------------    < end of copied text >    PROTEIN CALORIE MALNUTRITION PRESENT: [ ] Yes [ ] No  [ ] PPSV2 < or = to 30% [ ] significant weight loss  [ ] poor nutritional intake [ ] catabolic state [ ] anasarca     Albumin, Serum: 2.6 g/dL (19 @ 06:53)  Artificial Nutrition [ ]     REFERRALS:   [ ]Chaplaincy  [ ] Hospice  [ ]Child Life  [ ]Social Work  [ ]Case management [ ]Holistic Therapy   Goals of Care Discussion Document:

## 2019-03-28 NOTE — PROGRESS NOTE ADULT - SUBJECTIVE AND OBJECTIVE BOX
David Mo MD  PGY 1 Department of Internal Medicine  Pager: [phone]/ [phone]    Patient is a 63y old  Male who presents with a chief complaint of JANEL (27 Mar 2019 14:35)      SUBJECTIVE / OVERNIGHT EVENTS: Pt seen and examined. No acute overnight events. Pt reported no fever, chills, CP, SOB, abdominal pain, N/V/D, dysuria, or new joint aches.     MEDICATIONS  (STANDING):  atorvastatin 40 milliGRAM(s) Oral at bedtime  chlorhexidine 4% Liquid 1 Application(s) Topical every 12 hours  isosorbide   dinitrate Tablet (ISORDIL) 10 milliGRAM(s) Oral three times a day  levETIRAcetam 500 milliGRAM(s) Oral two times a day  levothyroxine 200 MICROGram(s) Oral daily  melatonin 3 milliGRAM(s) Oral at bedtime  meropenem  IVPB 500 milliGRAM(s) IV Intermittent every 24 hours  senna 2 Tablet(s) Oral at bedtime  sodium bicarbonate 650 milliGRAM(s) Oral two times a day  tamsulosin 0.4 milliGRAM(s) Oral at bedtime    MEDICATIONS  (PRN):  LORazepam   Injectable 1 milliGRAM(s) IntraMuscular every 6 hours PRN Agitation  morphine  - Injectable 1 milliGRAM(s) IV Push every 3 hours PRN Air hunger      I&O's Summary    26 Mar 2019 07:  -  27 Mar 2019 07:00  --------------------------------------------------------  IN: 718 mL / OUT: 0 mL / NET: 718 mL    27 Mar 2019 07:01  -  28 Mar 2019 06:55  --------------------------------------------------------  IN: 250 mL / OUT: 100 mL / NET: 150 mL        Vital Signs Last 24 Hrs  T(C): 37.5 (28 Mar 2019 04:14), Max: 38.9 (27 Mar 2019 10:31)  T(F): 99.5 (28 Mar 2019 04:14), Max: 102 (27 Mar 2019 10:31)  HR: 65 (28 Mar 2019 04:14) (54 - 101)  BP: 92/54 (28 Mar 2019 04:57) (67/41 - 96/60)  BP(mean): --  RR: 18 (28 Mar 2019 04:14) (18 - 19)  SpO2: 96% (28 Mar 2019 04:14) (94% - 100%)    CAPILLARY BLOOD GLUCOSE      POCT Blood Glucose.: 122 mg/dL (27 Mar 2019 13:25)      PHYSICAL EXAM:  GENERAL: NAD,   HEAD:  Atraumatic, Normocephalic  EYES: EOMI, PERRL, conjunctiva and sclera clear  NECK: No JVD  CHEST/LUNG: Clear to auscultation bilaterally; No wheeze  HEART: Regular rate and rhythm; No murmurs, rubs, or gallops  ABDOMEN: Soft, Nontender, Nondistended; Bowel sounds present  EXTREMITIES:  2+ Peripheral Pulses, No clubbing, cyanosis, or edema  PSYCH: AAOx3  NEUROLOGY: non-focal  SKIN: No rashes or lesions    LABS:                        8.2    3.82  )-----------( 66       ( 27 Mar 2019 10:14 )             26.3     Auto Eosinophil # x     / Auto Eosinophil % x     / Auto Neutrophil # x     / Auto Neutrophil % x     / BANDS % x                            8.8    3.04  )-----------( 73       ( 26 Mar 2019 08:39 )             27.8     Auto Eosinophil # 0.17  / Auto Eosinophil % 5.6   / Auto Neutrophil # 0.33  / Auto Neutrophil % 11.0  / BANDS % x            141  |  104  |  28<H>  ----------------------------<  124<H>  5.2   |  21<L>  |  4.84<H>      141  |  105  |  26<H>  ----------------------------<  118<H>  5.0   |  23  |  4.37<H>    Ca    8.8      27 Mar 2019 09:25  Mg     1.8       Phos  2.9               Urinalysis Basic - ( 27 Mar 2019 18:55 )    Color: Yulia / Appearance: Turbid / S.026 / pH: x  Gluc: x / Ketone: Small  / Bili: Negative / Urobili: <2 mg/dL   Blood: x / Protein: >600 mg/dL / Nitrite: Negative   Leuk Esterase: Negative / RBC: 3 /HPF /  /HPF   Sq Epi: x / Non Sq Epi: 11 /HPF / Bacteria: Few      Lactate, Blood: 1.8 mmol/L ( @ 14:42)        RADIOLOGY & ADDITIONAL TESTS:    Imaging Personally Reviewed:    Consultant(s) Notes Reviewed:      Care Discussed with Consultants/Other Providers: David Mo MD  PGY 1 Department of Internal Medicine  Pager: [phone]/ [phone]    Patient is a 63y old  Male who presents with a chief complaint of JANEL (27 Mar 2019 14:35)      SUBJECTIVE / OVERNIGHT EVENTS: Pt seen and examined. No acute overnight events. ROS unable to be obtained d/t poor mental status.     MEDICATIONS  (STANDING):  atorvastatin 40 milliGRAM(s) Oral at bedtime  chlorhexidine 4% Liquid 1 Application(s) Topical every 12 hours  isosorbide   dinitrate Tablet (ISORDIL) 10 milliGRAM(s) Oral three times a day  levETIRAcetam 500 milliGRAM(s) Oral two times a day  levothyroxine 200 MICROGram(s) Oral daily  melatonin 3 milliGRAM(s) Oral at bedtime  meropenem  IVPB 500 milliGRAM(s) IV Intermittent every 24 hours  senna 2 Tablet(s) Oral at bedtime  sodium bicarbonate 650 milliGRAM(s) Oral two times a day  tamsulosin 0.4 milliGRAM(s) Oral at bedtime    MEDICATIONS  (PRN):  LORazepam   Injectable 1 milliGRAM(s) IntraMuscular every 6 hours PRN Agitation  morphine  - Injectable 1 milliGRAM(s) IV Push every 3 hours PRN Air hunger      I&O's Summary    26 Mar 2019 07:  -  27 Mar 2019 07:00  --------------------------------------------------------  IN: 718 mL / OUT: 0 mL / NET: 718 mL    27 Mar 2019 07:01  -  28 Mar 2019 06:55  --------------------------------------------------------  IN: 250 mL / OUT: 100 mL / NET: 150 mL        Vital Signs Last 24 Hrs  T(C): 37.5 (28 Mar 2019 04:14), Max: 38.9 (27 Mar 2019 10:31)  T(F): 99.5 (28 Mar 2019 04:14), Max: 102 (27 Mar 2019 10:31)  HR: 65 (28 Mar 2019 04:14) (54 - 101)  BP: 92/54 (28 Mar 2019 04:57) (67/41 - 96/60)  BP(mean): --  RR: 18 (28 Mar 2019 04:14) (18 - 19)  SpO2: 96% (28 Mar 2019 04:14) (94% - 100%)    CAPILLARY BLOOD GLUCOSE      POCT Blood Glucose.: 122 mg/dL (27 Mar 2019 13:25)      PHYSICAL EXAM:  GENERAL: NAD,   HEAD:  Atraumatic, Normocephalic  EYES: EOMI, PERRL, conjunctiva and sclera clear  NECK: No JVD  CHEST/LUNG: Clear to auscultation bilaterally; No wheeze  HEART: Regular rate and rhythm; No murmurs, rubs, or gallops  ABDOMEN: Soft, Nontender, Nondistended; Bowel sounds present  EXTREMITIES:  2+ Peripheral Pulses, No clubbing, cyanosis, or edema  PSYCH: AAOx0  NEUROLOGY: non-focal  SKIN: No rashes or lesions    LABS:                        8.2    3.82  )-----------( 66       ( 27 Mar 2019 10:14 )             26.3     Auto Eosinophil # x     / Auto Eosinophil % x     / Auto Neutrophil # x     / Auto Neutrophil % x     / BANDS % x                            8.8    3.04  )-----------( 73       ( 26 Mar 2019 08:39 )             27.8     Auto Eosinophil # 0.17  / Auto Eosinophil % 5.6   / Auto Neutrophil # 0.33  / Auto Neutrophil % 11.0  / BANDS % x            141  |  104  |  28<H>  ----------------------------<  124<H>  5.2   |  21<L>  |  4.84<H>      141  |  105  |  26<H>  ----------------------------<  118<H>  5.0   |  23  |  4.37<H>    Ca    8.8      27 Mar 2019 09:25  Mg     1.8       Phos  2.9               Urinalysis Basic - ( 27 Mar 2019 18:55 )    Color: Yulia / Appearance: Turbid / S.026 / pH: x  Gluc: x / Ketone: Small  / Bili: Negative / Urobili: <2 mg/dL   Blood: x / Protein: >600 mg/dL / Nitrite: Negative   Leuk Esterase: Negative / RBC: 3 /HPF /  /HPF   Sq Epi: x / Non Sq Epi: 11 /HPF / Bacteria: Few      Lactate, Blood: 1.8 mmol/L ( @ 14:42)        RADIOLOGY & ADDITIONAL TESTS:    Imaging Personally Reviewed:    Consultant(s) Notes Reviewed:      Care Discussed with Consultants/Other Providers:

## 2019-03-28 NOTE — CONSULT NOTE ADULT - ASSESSMENT
64 y/o M with bioprosthetic AV & MV c/b endocarditis (on ceftriaxone and rifampin), hypothyroidism, BPH, history of seizures, and HTN, who presents to the hospital from rehab for suicidal ideations found to have AMS, JANEL, fever, all 2/2 endocarditis with suspected hemorrhagic septic emboli by MRI.  Hospital course now complicated by JANEL, fever, tachycardia concerning for severe sepsis. Palliative care was consulted for PCU eval and assistance with symptoms Rx.

## 2019-03-28 NOTE — PROGRESS NOTE ADULT - ASSESSMENT
63 year old male with recent admission for endocarditis discharged on ceftriaxone and rifampin via right arm picc, hypothyroidism, ?nephrectomy, BPH, history of seizures, and HTN who presents to the hospital from rehab for suicidal ideations.     At NH, he told someone from the staff that he wanted to kill himself so he was brought to the ED  s/p 2 heart valve replacements (Aortic and mitral valve), last replacement was 2 year ago     While at [facility], found to have endocarditis on ceftriaxone and rifampin - was reported as culture negative endocarditis    Right arm picc   Now afebrile  TTE with prosthetic mitral valve with vegetation, has also prosthetic aortic valve  CT head/ MRI Head concerning for septic emboli, new areas of hemorrhage within the Right cerebellar hemisphere and right occipital lobe  Anemia, thrombocytopenia  Remains with JANEL today worsened  CMV serologies negative  EBV serologies consistent with prior infection  HIV negative  CARLY with mitral valve vegetation that prolapses into the left ventricle  Vanco level supratherapeutic on hold  ?Mycobacterium chimera - afb cultures sent  RRT called for hypotension, febrile yesterday      Antibiotics have been discontinued. GOC to make patient comfortable.  ID service will be available for any questions.

## 2019-03-28 NOTE — PROGRESS NOTE ADULT - PROBLEM SELECTOR PLAN 9
May be marrow suppression 2/2 sepsis.  Anemia possibly in setting of CKD. Past records showing normal cell cts acquired. Likely sepsis induced suppression.  - Parvo neg,CMV neg, EBV neg, HIV neg  -cbc qd May be marrow suppression 2/2 sepsis.  Anemia possibly in setting of CKD. Past records showing normal cell cts acquired. Likely sepsis induced suppression.  - Parvo neg,CMV neg, EBV neg, HIV neg

## 2019-03-28 NOTE — CONSULT NOTE ADULT - PROBLEM SELECTOR RECOMMENDATION 9
Unclear source but with PAIN AD 6  Will DC Morphine due to renal impairment   Patient used x 4 doses of Morphine 1 mg IV/ 24 hours.   Will start Dilaudid 0.2 mg IV q 4 ATC and Will add Dilaudid 0.3 mg IV q 1 PRN

## 2019-03-28 NOTE — CONSULT NOTE ADULT - PROBLEM SELECTOR RECOMMENDATION 2
2/2 to Encephalopathy   No further work up or Rx I expected   Will change Ativan to 0.5 mg IV q 6 PRN

## 2019-03-28 NOTE — PROGRESS NOTE ADULT - PROBLEM SELECTOR PLAN 1
Pt febrile yesterday to 102F, tachycardic, and hypotensive. Pt received multiple low volume fluid bolus due to poor EF. Blood cxs sent. UA + for protein >600. Pt's family wished for comfort care measures.   - F/u blood cx  - Cefepime broadened to meropenem 500mg qd (renally dosed)  - Morphine prn for air hunger.  - holding all antihypertensive meds in setting of sepsis Pt febrile yesterday to 102F, tachycardic, and hypotensive. Pt received multiple low volume fluid bolus due to poor EF. Blood cxs sent. UA + for protein >600. Pt's family wished for comfort care measures.   - withholding all treatment measures after family discussion.   - Pt to be transferred to PCU  - Morphine prn for air hunger.  - holding all antihypertensive meds in setting of sepsis

## 2019-03-28 NOTE — GOALS OF CARE CONVERSATION - PERSONAL ADVANCE DIRECTIVE - SURROGATE NAME
The patient has 3 sisters and 2 brothers. The primary team and myself were able to called and talked to Elmira ([phone]), Koby ([phone]), Shaneka ([phone]). There was not a way to leave a message for Antonio and Charleen. The siblings that were able to be reached out, agree on Mr. Alana Linares ([phone]), the patient's brother, being the main decision maker.

## 2019-03-28 NOTE — PROGRESS NOTE ADULT - NSHPATTENDINGPLANDISCUSS_GEN_ALL_CORE
primary team
Dr. Villaloobs
Neurology residents and medical students on stroke service
D/w Dr. Sue
D/w Dr Sue

## 2019-03-28 NOTE — CONSULT NOTE ADULT - CONSULT REASON
64y/o M with culture-neg endocardititis with worsening hypotension on the floors, family opted for comfort care yesterday during an RRT. Request for possible transport to palliative care unit for pain control.  PCU eval/Pain Rx.

## 2019-03-28 NOTE — PROGRESS NOTE ADULT - SUBJECTIVE AND OBJECTIVE BOX
CC: Patient is a 63y old  Male who presents with a chief complaint of JANEL (28 Mar 2019 10:59)    ID following for endocarditis    Interval History/ROS: Patient arousable. Per GOC, antibiotics were discontinued at goal of making patient comfortable.    Rest of ROS negative.    Allergies  penicillin (Swelling)    ANTIMICROBIALS:      OTHER MEDS:  acetaminophen  Suppository .. 650 milliGRAM(s) Rectal every 6 hours PRN  chlorhexidine 4% Liquid 1 Application(s) Topical every 12 hours  glycopyrrolate Injectable 0.4 milliGRAM(s) IV Push every 6 hours PRN  HYDROmorphone  Injectable 0.3 milliGRAM(s) IV Push every 1 hour PRN  HYDROmorphone  Injectable 0.3 milliGRAM(s) IV Push every 1 hour PRN  HYDROmorphone  Injectable 0.2 milliGRAM(s) IV Push every 4 hours  levETIRAcetam  IVPB 500 milliGRAM(s) IV Intermittent every 12 hours  LORazepam   Injectable 0.5 milliGRAM(s) IV Push every 6 hours PRN    PE:    Vital Signs Last 24 Hrs  T(C): 37.5 (28 Mar 2019 13:35), Max: 38.1 (28 Mar 2019 08:08)  T(F): 99.5 (28 Mar 2019 13:35), Max: 100.5 (28 Mar 2019 08:08)  HR: 82 (28 Mar 2019 08:08) (54 - 101)  BP: 82/52 (28 Mar 2019 08:08) (77/56 - 96/60)  BP(mean): --  RR: 16 (28 Mar 2019 13:35) (16 - 19)  SpO2: 98% (28 Mar 2019 13:35) (96% - 100%)    Gen: Arousable, NAD  CV: S1+S2 normal, no murmurs  Resp: Clear bilat, no resp distress  Abd: Soft, nontender, +BS  Ext: No LE edema, no wounds  : No Griffith  IV/Skin: No thrombophlebitis, PICC intact  Neuro: no focal deficits    LABS:                          7.3    3.31  )-----------( 76       ( 28 Mar 2019 09:42 )             23.9       03-28    142  |  105  |  41<H>  ----------------------------<  100<H>  5.8<H>   |  22  |  6.57<H>    Ca    8.3<L>      28 Mar 2019 06:53  Phos  2.9       Mg     1.8         TPro  6.5  /  Alb  2.6<L>  /  TBili  0.6  /  DBili  x   /  AST  37  /  ALT  10  /  AlkPhos  70        Urinalysis Basic - ( 27 Mar 2019 18:55 )    Color: Yulia / Appearance: Turbid / S.026 / pH: x  Gluc: x / Ketone: Small  / Bili: Negative / Urobili: <2 mg/dL   Blood: x / Protein: >600 mg/dL / Nitrite: Negative   Leuk Esterase: Negative / RBC: 3 /HPF /  /HPF   Sq Epi: x / Non Sq Epi: 11 /HPF / Bacteria: Few    MICROBIOLOGY:  Vancomycin Level, Trough: 18.1 ug/mL (19 @ 06:53)  v  .Blood Blood  19   Culture is being performed.  --  --      .Urine Catheterized  19   No growth  --  --      .Blood Blood-Peripheral  03-15-19   No growth at 5 days.  --  --    CMV IgG Antibody: 0.27 U/mL (19 @ 12:53)    RADIOLOGY:    < from: Xray Chest 1 View AP/PA (19 @ 12:04) >  FINDINGS/  IMPRESSION:    Right PICC tip within SVC. A status post a sternotomy, aortic and mitral   valve replacement. Watchman device is present.    No consolidation, pleural effusion or pneumothorax. No pulmonary edema.    The cardiac silhouette remains enlarged.    < end of copied text >

## 2019-03-28 NOTE — PROGRESS NOTE ADULT - ASSESSMENT
64 y/o M w/t PMHx of bioprosthetic AV & MV c/b endocarditis (on ceftriaxone and rifampin), hypothyroidism, BPH, history of seizures, and HTN, who presents to the hospital from rehab for suicidal ideations found to have AMS, JANEL, fever, all 2/2 endocarditis with suspected hemorrhagic septic emboli by MR; hospital course now complicated by JANEL, fever, tachycardia concerning for severe sepsis.

## 2019-03-28 NOTE — PROGRESS NOTE ADULT - PROBLEM SELECTOR PLAN 2
Abdominal pain and AMS 2/2 septic emboli. MR reveals occipital lobe stroke in the setting of endocarditis. Now c/b hemorrhagic conversion on MRI.   - Oxycodone prn for abd pain  -Endocarditis management as below Abdominal pain and AMS 2/2 septic emboli. MR reveals occipital lobe stroke in the setting of endocarditis. Now c/b hemorrhagic conversion on MRI.   - Pain meds as per palliative  - Endocarditis management as below

## 2019-03-28 NOTE — PROGRESS NOTE ADULT - PROBLEM SELECTOR PLAN 5
AMS 2/2 septic emboli possibly c/b underlying depression per brother  -Psych consulted  -QTc > 500 so haldol being held  -EKG QD to monitor QTc  -Per psych: may get 1mg ativan IM Q6h prn agitation until QTc shortens AMS 2/2 septic emboli possibly c/b underlying depression per brother  -Psych consulted  -QTc > 500 so haldol being held  -EKG QD to monitor QTc

## 2019-03-28 NOTE — PROGRESS NOTE ADULT - PROBLEM SELECTOR PLAN 7
Pt w/ hx of htn.   - C/w Lopressor 25mg BID  - Isordil 10mg TID,   - hydralazine 10mg TID,   - hold torsemide 20mg daily Pt w/ hx of htn.   - See above for med management

## 2019-03-29 NOTE — PROGRESS NOTE ADULT - SUBJECTIVE AND OBJECTIVE BOX
GAP TEAM PALLIATIVE CARE UNIT PROGRESS NOTE:      [  ] Patient on hospice program.    INDICATION FOR PALLIATIVE CARE UNIT SERVICES:    INTERVAL HPI/OVERNIGHT EVENTS:    DNR on chart: Yes    Allergies    penicillin (Swelling)    Intolerances    MEDICATIONS  (STANDING):  chlorhexidine 4% Liquid 1 Application(s) Topical every 12 hours  HYDROmorphone  Injectable 0.2 milliGRAM(s) IV Push every 4 hours  levETIRAcetam  IVPB 500 milliGRAM(s) IV Intermittent every 12 hours    MEDICATIONS  (PRN):  acetaminophen  Suppository .. 650 milliGRAM(s) Rectal every 6 hours PRN Temp greater or equal to 38C (100.4F)  glycopyrrolate Injectable 0.4 milliGRAM(s) IV Push every 6 hours PRN secretions  HYDROmorphone  Injectable 0.3 milliGRAM(s) IV Push every 1 hour PRN Labored breathing or RR > 28  HYDROmorphone  Injectable 0.3 milliGRAM(s) IV Push every 1 hour PRN Moderate to Severe pain  LORazepam   Injectable 0.5 milliGRAM(s) IV Push every 6 hours PRN Agitation or recurrent labored breathing after back to back opioids are given.    ITEMS UNCHECKED ARE NOT PRESENT    PRESENT SYMPTOMS: [x ]Unable to obtain due to poor mentation   Source if other than patient:  [ ]Family   [ ]Team     Pain (Impact on QOL):    Location:       Minimal acceptable level (0-10 scale):                    Aggravating factors:  Quality:  Radiation:  Severity (0-10 scale):     Dyspnea:                           [ ]Mild [ ]Moderate [ ]Severe  Anxiety:                             [ ]Mild [ ]Moderate [ ]Severe  Fatigue:                             [ ]Mild [ ]Moderate [ ]Severe  Nausea:                             [ ]Mild [ ]Moderate [ ]Severe  Loss of appetite:              [ ]Mild [ ]Moderate [ ]Severe  Constipation:                    [ ]Mild [ ]Moderate [ ]Severe    PAINAD Score:    http://[email or web address]/painad.pdf (Ctrl +  left click to view)  		  Other Symptoms:  [x ]All other review of systems negative     Karnofsky Performance Score/Palliative Performance Status Version 2:         %        http://palliative.info/resource_material/[email or web address].pdf  PHYSICAL EXAM:  Vital Signs Last 24 Hrs  T(C): 39.4 (29 Mar 2019 09:06), Max: 39.4 (29 Mar 2019 09:06)  T(F): 103 (29 Mar 2019 09:06), Max: 103 (29 Mar 2019 09:06)  HR: 69 (29 Mar 2019 09:06) (45 - 85)  BP: 85/51 (29 Mar 2019 09:06) (82/48 - 92/75)  BP(mean): 62 (28 Mar 2019 20:00) (62 - 62)  RR: 18 (29 Mar 2019 09:06) (16 - 18)  SpO2: 94% (29 Mar 2019 09:06) (94% - 98%) I&O's Summary  GENERAL:  [ ]Alert  [ ]Oriented x   [ ]Lethargic  [ ]Cachexia  [x ]Unarousable  [ ]Verbal  [ ]Non-Verbal  Behavioral:   [ ] Anxiety  [ ] Delirium [ ] Agitation [ ] Other  HEENT:  [x ]Normal   [ ]Dry mouth   [ ]ET Tube/Trach  [ ]Oral lesions  PULMONARY:   [x ]Clear [ ]Tachypnea  [ ]Audible excessive secretions   [ ]Rhonchi        [ ]Right [ ]Left [ ]Bilateral  [ ]Crackles        [ ]Right [ ]Left [ ]Bilateral  [ ]Wheezing     [ ]Right [ ]Left [ ]Bilateral  CARDIOVASCULAR:    [x ]Regular [ ]Irregular [ ]Tachy  [ ]Bolivar [ ]Murmur [ ]Other  GASTROINTESTINAL:  [x ]Soft  [ ]Distended   [ ]+BS  [ ]Non tender [ ]Tender  [ ]PEG [ ]OGT/ NGT   Last BM:     GENITOURINARY:  [x ]Normal [ ] Incontinent   [ ]Oliguria/Anuria   [ ]Griffith  MUSCULOSKELETAL:   [x ]Normal   [ ]Weakness  [ ]Bed/Wheelchair bound [ ]Edema  NEUROLOGIC:   [x ]No focal deficits  [ ] Cognitive impairment  [ ] Dysphagia [ ]Dysarthria [ ] Paresis [ ]Other   SKIN:   [x ]Normal   [ ]Pressure ulcer(s)  [ ]Rash    CRITICAL CARE:  [ ] Shock Present  [x ]Septic [ ]Cardiogenic [ ]Neurologic [ ]Hypovolemic  [ ]  Vasopressors [ ]  Inotropes   [ ] Respiratory failure present  [ ] Acute  [ ] Chronic [ ] Hypoxic  [ ] Hypercarbic [ ] Other  [ ] Other organ failure     LABS:                        7.3    3.31  )-----------( 76       ( 28 Mar 2019 09:42 )             23.9   03-28    142  |  105  |  41<H>  ----------------------------<  100<H>  5.8<H>   |  22  |  6.57<H>    Ca    8.3<L>      28 Mar 2019 06:53    TPro  6.5  /  Alb  2.6<L>  /  TBili  0.6  /  DBili  x   /  AST  37  /  ALT  10  /  AlkPhos  70        Urinalysis Basic - ( 27 Mar 2019 18:55 )    Color: Yulia / Appearance: Turbid / S.026 / pH: x  Gluc: x / Ketone: Small  / Bili: Negative / Urobili: <2 mg/dL   Blood: x / Protein: >600 mg/dL / Nitrite: Negative   Leuk Esterase: Negative / RBC: 3 /HPF /  /HPF   Sq Epi: x / Non Sq Epi: 11 /HPF / Bacteria: Few      RADIOLOGY & ADDITIONAL STUDIES:    PROTEIN CALORIE MALNUTRITION:   [ ] PPSV2 < or = 30% [ ] significant weight loss [ ] poor nutritional intake [ ] anasarca [ ] catabolic state   Albumin, Serum: 2.6 g/dL (19 @ 06:53)   Artificial Nutrition [ ]     REFERRALS:   [ ]Chaplaincy  [ ] Hospice  [ ]Child Life  [ ]Social Work  [ ]Case management [ ]Holistic Therapy [ ] Physical Therapy [ ] Dietary   Goals of Care Document: Goals of Care Conversation - Personal Advance Directive [JOSE ANTONIO Haskins] (19 @ 13:03) GAP TEAM PALLIATIVE CARE UNIT PROGRESS NOTE:      [  ] Patient on hospice program.      Lethargic    INDICATION FOR PALLIATIVE CARE UNIT SERVICES:    INTERVAL HPI/OVERNIGHT EVENTS:    DNR on chart: Yes    Allergies    penicillin (Swelling)    Intolerances    MEDICATIONS  (STANDING):  chlorhexidine 4% Liquid 1 Application(s) Topical every 12 hours  HYDROmorphone  Injectable 0.2 milliGRAM(s) IV Push every 4 hours  levETIRAcetam  IVPB 500 milliGRAM(s) IV Intermittent every 12 hours    MEDICATIONS  (PRN):  acetaminophen  Suppository .. 650 milliGRAM(s) Rectal every 6 hours PRN Temp greater or equal to 38C (100.4F)  glycopyrrolate Injectable 0.4 milliGRAM(s) IV Push every 6 hours PRN secretions  HYDROmorphone  Injectable 0.3 milliGRAM(s) IV Push every 1 hour PRN Labored breathing or RR > 28  HYDROmorphone  Injectable 0.3 milliGRAM(s) IV Push every 1 hour PRN Moderate to Severe pain  LORazepam   Injectable 0.5 milliGRAM(s) IV Push every 6 hours PRN Agitation or recurrent labored breathing after back to back opioids are given.    ITEMS UNCHECKED ARE NOT PRESENT    PRESENT SYMPTOMS: [x ]Unable to obtain due to poor mentation   Source if other than patient:  [ ]Family   [ ]Team     Pain (Impact on QOL):    Location:       Minimal acceptable level (0-10 scale):                    Aggravating factors:  Quality:  Radiation:  Severity (0-10 scale):     Dyspnea:                           [ ]Mild [ ]Moderate [ ]Severe  Anxiety:                             [ ]Mild [ ]Moderate [ ]Severe  Fatigue:                             [ ]Mild [ ]Moderate [ ]Severe  Nausea:                             [ ]Mild [ ]Moderate [ ]Severe  Loss of appetite:              [ ]Mild [ ]Moderate [ ]Severe  Constipation:                    [ ]Mild [ ]Moderate [ ]Severe    PAINAD Score:    [email or web address].[email or web address].[email or web address]/cog/painad.pdf (Ctrl +  left click to view)  		  Other Symptoms:  [x ]All other review of systems negative     Karnofsky Performance Score/Palliative Performance Status Version 2:     40    %        http://palliative.info/resource_material/[email or web address].pdf  PHYSICAL EXAM:  Vital Signs Last 24 Hrs  T(C): 39.4 (29 Mar 2019 09:06), Max: 39.4 (29 Mar 2019 09:06)  T(F): 103 (29 Mar 2019 09:06), Max: 103 (29 Mar 2019 09:06)  HR: 69 (29 Mar 2019 09:06) (45 - 85)  BP: 85/51 (29 Mar 2019 09:06) (82/48 - 92/75)  BP(mean): 62 (28 Mar 2019 20:00) (62 - 62)  RR: 18 (29 Mar 2019 09:06) (16 - 18)  SpO2: 94% (29 Mar 2019 09:06) (94% - 98%) I&O's Summary  GENERAL:  [ ]Alert  [ ]Oriented x   [ ]Lethargic  [ ]Cachexia  [x ]Unarousable  [ ]Verbal  [ ]Non-Verbal  Behavioral:   [ ] Anxiety  [ ] Delirium [ ] Agitation [ ] Other  HEENT:  [x ]Normal   [ ]Dry mouth   [ ]ET Tube/Trach  [ ]Oral lesions  PULMONARY:   [x ]Clear [ ]Tachypnea  [ ]Audible excessive secretions   [ ]Rhonchi        [ ]Right [ ]Left [ ]Bilateral  [ ]Crackles        [ ]Right [ ]Left [ ]Bilateral  [ ]Wheezing     [ ]Right [ ]Left [ ]Bilateral  CARDIOVASCULAR:    [x ]Regular [ ]Irregular [ ]Tachy  [ ]Bolivar [ ]Murmur [ ]Other  GASTROINTESTINAL:  [x ]Soft  [ ]Distended   [ ]+BS  [ ]Non tender [ ]Tender  [ ]PEG [ ]OGT/ NGT   Last BM:     GENITOURINARY:  [x ]Normal [ ] Incontinent   [ ]Oliguria/Anuria   [ ]Griffith  MUSCULOSKELETAL:   [x ]Normal   [ ]Weakness  [ ]Bed/Wheelchair bound [ ]Edema  NEUROLOGIC:   [x ]No focal deficits  [ ] Cognitive impairment  [ ] Dysphagia [ ]Dysarthria [ ] Paresis [ ]Other   SKIN:   [x ]Normal   [ ]Pressure ulcer(s)  [ ]Rash    CRITICAL CARE:  [ ] Shock Present  [x ]Septic [ ]Cardiogenic [ ]Neurologic [ ]Hypovolemic  [ ]  Vasopressors [ ]  Inotropes   [ ] Respiratory failure present  [ ] Acute  [ ] Chronic [ ] Hypoxic  [ ] Hypercarbic [ ] Other  [ ] Other organ failure     LABS:                        7.3    3.31  )-----------( 76       ( 28 Mar 2019 09:42 )             23.9   03-28    142  |  105  |  41<H>  ----------------------------<  100<H>  5.8<H>   |  22  |  6.57<H>    Ca    8.3<L>      28 Mar 2019 06:53    TPro  6.5  /  Alb  2.6<L>  /  TBili  0.6  /  DBili  x   /  AST  37  /  ALT  10  /  AlkPhos  70        Urinalysis Basic - ( 27 Mar 2019 18:55 )    Color: Yulia / Appearance: Turbid / S.026 / pH: x  Gluc: x / Ketone: Small  / Bili: Negative / Urobili: <2 mg/dL   Blood: x / Protein: >600 mg/dL / Nitrite: Negative   Leuk Esterase: Negative / RBC: 3 /HPF /  /HPF   Sq Epi: x / Non Sq Epi: 11 /HPF / Bacteria: Few      RADIOLOGY & ADDITIONAL STUDIES:    PROTEIN CALORIE MALNUTRITION:   [ ] PPSV2 < or = 30% [ ] significant weight loss [ ] poor nutritional intake [ ] anasarca [ ] catabolic state   Albumin, Serum: 2.6 g/dL (19 @ 06:53)   Artificial Nutrition [ ]     REFERRALS:   [ ]Chaplaincy  [ ] Hospice  [ ]Child Life  [ ]Social Work  [ ]Case management [ ]Holistic Therapy [ ] Physical Therapy [ ] Dietary   Goals of Care Document: Goals of Care Conversation - Personal Advance Directive [JOSE ANTONIO Haskins] (19 @ 13:03)

## 2019-03-29 NOTE — PROGRESS NOTE ADULT - ASSESSMENT
62 y/o M with bioprosthetic AV & MV c/b endocarditis (on ceftriaxone and rifampin), hypothyroidism, BPH, history of seizures, and HTN, who presents to the hospital from rehab for suicidal ideations found to have AMS, JANEL, fever, all 2/2 endocarditis with suspected hemorrhagic septic emboli by MRI.  Hospital course now complicated by JANEL, fever, tachycardia concerning for severe sepsis. Pt transferred to PCU for further care

## 2019-03-29 NOTE — PROGRESS NOTE ADULT - PROBLEM SELECTOR PLAN 5
GOC: Decision maker is the patient's brother Alana [phone]. The designated surrogate, stated on 3/28 that even before the patient became so ill, that he was already having issues with independency that were affecting his quality of life (QOL). Alana stated that even if there was a chance for a Rx that he was not able to see how the patient was going to be able to regain independency and instead being on a position were his QOL was to be severely compromised. Eltalon indicated that understanding the patient's current situation that at this point GOC should be guided towards preventing and treating distressful symptoms. HE agree with stopping Abx and any further work up. Will stop all other non symptoms focused meds.Patient is already DNR.

## 2019-03-30 NOTE — PROGRESS NOTE ADULT - PROBLEM SELECTOR PLAN 1
- continue with dilaudid 0.2 mg q4 standing  - continue with dilaudid 0.3 mg prn q1  - pt required 1 prn in past 24 hrs - controlled.   - continue with dilaudid 0.2 mg q4 standing  - continue with dilaudid 0.3 mg prn q1  - pt required 1 prn in past 24 hrs

## 2019-03-30 NOTE — PROGRESS NOTE ADULT - PROBLEM SELECTOR PLAN 5
GOC: Decision maker is the patient's brother Alana [phone]. The designated surrogate, stated on 3/28 that even before the patient became so ill, that he was already having issues with independency that were affecting his quality of life (QOL). Alana stated that even if there was a chance for a Rx that he was not able to see how the patient was going to be able to regain independency and instead being on a position were his QOL was to be severely compromised. Eltalon indicated that understanding the patient's current situation that at this point GOC should be guided towards preventing and treating distressful symptoms. He agree with stopping Abx and any further work up. Will stop all other non symptoms focused meds. Patient is already DNR.

## 2019-03-30 NOTE — PROGRESS NOTE ADULT - SUBJECTIVE AND OBJECTIVE BOX
GAP TEAM PALLIATIVE CARE UNIT PROGRESS NOTE:      [  ] Patient on hospice program.    INDICATION FOR PALLIATIVE CARE UNIT SERVICES: EOL care, pain, labored breathing and agitation.     INTERVAL HPI/OVERNIGHT EVENTS: Required Dilaudid dose x 1 for pain.    DNR on chart: Yes    Allergies    penicillin (Swelling)    Intolerances    MEDICATIONS  (STANDING):  chlorhexidine 4% Liquid 1 Application(s) Topical every 12 hours  HYDROmorphone  Injectable 0.2 milliGRAM(s) IV Push every 4 hours  levETIRAcetam  IVPB 500 milliGRAM(s) IV Intermittent every 12 hours    MEDICATIONS  (PRN):  acetaminophen  Suppository .. 650 milliGRAM(s) Rectal every 6 hours PRN Temp greater or equal to 38C (100.4F)  glycopyrrolate Injectable 0.4 milliGRAM(s) IV Push every 6 hours PRN secretions  HYDROmorphone  Injectable 0.3 milliGRAM(s) IV Push every 1 hour PRN Labored breathing or RR > 28  HYDROmorphone  Injectable 0.3 milliGRAM(s) IV Push every 1 hour PRN Moderate to Severe pain  LORazepam   Injectable 0.5 milliGRAM(s) IV Push every 6 hours PRN Agitation or recurrent labored breathing after back to back opioids are given.    ITEMS UNCHECKED ARE NOT PRESENT    PRESENT SYMPTOMS: [x ]Unable to obtain due to poor mentation   Source if other than patient:  [ ]Family   [ ]Team     Pain (Impact on QOL):    Location:       Minimal acceptable level (0-10 scale):                    Aggravating factors:  Quality:  Radiation:  Severity (0-10 scale):     Dyspnea:                           [ ]Mild [ ]Moderate [ ]Severe  Anxiety:                             [ ]Mild [ ]Moderate [ ]Severe  Fatigue:                             [ ]Mild [ ]Moderate [ ]Severe  Nausea:                             [ ]Mild [ ]Moderate [ ]Severe  Loss of appetite:              [ ]Mild [ ]Moderate [ ]Severe  Constipation:                    [ ]Mild [ ]Moderate [ ]Severe    PAINAD Score:    [email or web address]/cog/painad.[email or web address] (Ctrl +  left click to view)  		  Other Symptoms:  [x ]All other review of systems negative     Karnofsky Performance Score/Palliative Performance Status Version 2: 20 %        http://palliative.info/resource_material/[email or web address].pdf    PHYSICAL EXAM:  Vital Signs Last 24 Hrs  T(C): 36.8 (30 Mar 2019 08:37), Max: 37.9 (29 Mar 2019 19:35)  T(F): 98.3 (30 Mar 2019 08:37), Max: 100.2 (29 Mar 2019 19:35)  HR: 54 (30 Mar 2019 08:37) (54 - 54)  BP: 101/62 (30 Mar 2019 08:37) (101/62 - 101/62)  BP(mean): --  RR: 20 (30 Mar 2019 08:37) (20 - 20)  SpO2: 97% (30 Mar 2019 08:37) (97% - 97%)    GENERAL:  [ ]Alert  [ ]Oriented x   [ ]Lethargic  [ ]Cachexia  [x ]Unarousable  [ ]Verbal  [x]Non-Verbal  Behavioral:   [ ] Anxiety  [ ] Delirium [ ] Agitation [ ] Other  HEENT:  [x ]Normal   [ ]Dry mouth   [ ]ET Tube/Trach  [ ]Oral lesions  PULMONARY:   [x ]Clear [ ]Tachypnea  [ ]Audible excessive secretions   [ ]Rhonchi        [ ]Right [ ]Left [ ]Bilateral  [ ]Crackles        [ ]Right [ ]Left [ ]Bilateral  [ ]Wheezing     [ ]Right [ ]Left [ ]Bilateral  CARDIOVASCULAR:    [x ]Regular [ ]Irregular [ ]Tachy  [ ]Bolivar [ ]Murmur [ ]Other  GASTROINTESTINAL:  [x ]Soft  [ ]Distended   [ ]+BS  [ ]Non tender [ ]Tender  [ ]PEG [ ]OGT/ NGT   Last BM:     GENITOURINARY:  [x ]Normal [ ] Incontinent   [ ]Oliguria/Anuria   [ ]Griffith  MUSCULOSKELETAL:   [x ]Normal   [ ]Weakness  [ ]Bed/Wheelchair bound [ ]Edema  NEUROLOGIC:   [x ]No focal deficits  [ ] Cognitive impairment  [ ] Dysphagia [ ]Dysarthria [ ] Paresis [ ]Other   SKIN:   [x ]Normal   [ ]Pressure ulcer(s)  [ ]Rash    CRITICAL CARE:  [ ] Shock Present  [x ]Septic [ ]Cardiogenic [ ]Neurologic [ ]Hypovolemic  [ ]  Vasopressors [ ]  Inotropes   [ ] Respiratory failure present  [ ] Acute  [ ] Chronic [ ] Hypoxic  [ ] Hypercarbic [ ] Other  [ ] Other organ failure     LABS:             no further blood draws    RADIOLOGY & ADDITIONAL STUDIES: reviewed    PROTEIN CALORIE MALNUTRITION:   [x] PPSV2 < or = 30% [ ] significant weight loss [x] poor nutritional intake [ ] anasarca [x] catabolic state   Albumin, Serum: 2.6 g/dL (03-28-19 @ 06:53)   Artificial Nutrition [ ]     REFERRALS:   [ ]Chaplaincy  [ ] Hospice  [ ]Child Life  [ ]Social Work  [ ]Case management [ ]Holistic Therapy [ ] Physical Therapy [ ] Dietary   Goals of Care Document: Goals of Care Conversation - Personal Advance Directive [JOSE ANTONIO Haskins] (03-28-19 @ 13:03) GAP TEAM PALLIATIVE CARE UNIT PROGRESS NOTE:      [  ] Patient on hospice program.    INDICATION FOR PALLIATIVE CARE UNIT SERVICES: EOL care, pain, labored breathing and agitation.     INTERVAL HPI/OVERNIGHT EVENTS: Required Dilaudid dose x 1 for pain.    DNR on chart: Yes    Allergies    penicillin (Swelling)    Intolerances    MEDICATIONS  (STANDING):  chlorhexidine 4% Liquid 1 Application(s) Topical every 12 hours  HYDROmorphone  Injectable 0.2 milliGRAM(s) IV Push every 4 hours  levETIRAcetam  IVPB 500 milliGRAM(s) IV Intermittent every 12 hours    MEDICATIONS  (PRN):  acetaminophen  Suppository .. 650 milliGRAM(s) Rectal every 6 hours PRN Temp greater or equal to 38C (100.4F)  glycopyrrolate Injectable 0.4 milliGRAM(s) IV Push every 6 hours PRN secretions  HYDROmorphone  Injectable 0.3 milliGRAM(s) IV Push every 1 hour PRN Labored breathing or RR > 28  HYDROmorphone  Injectable 0.3 milliGRAM(s) IV Push every 1 hour PRN Moderate to Severe pain  LORazepam   Injectable 0.5 milliGRAM(s) IV Push every 6 hours PRN Agitation or recurrent labored breathing after back to back opioids are given.    ITEMS UNCHECKED ARE NOT PRESENT    PRESENT SYMPTOMS: [x ]Unable to obtain due to poor mentation   Source if other than patient:  [ ]Family   [ ]Team     Pain (Impact on QOL):    Location:       Minimal acceptable level (0-10 scale):                    Aggravating factors:  Quality:  Radiation:  Severity (0-10 scale):     Dyspnea:                           [ ]Mild [ ]Moderate [ ]Severe  Anxiety:                             [ ]Mild [ ]Moderate [ ]Severe  Fatigue:                             [ ]Mild [ ]Moderate [ ]Severe  Nausea:                             [ ]Mild [ ]Moderate [ ]Severe  Loss of appetite:              [ ]Mild [ ]Moderate [ ]Severe  Constipation:                    [ ]Mild [ ]Moderate [ ]Severe    PAINAD Score:    [email or web address].[email or web address]/cog/painad.pdf (Ctrl +  left click to view)  		  Other Symptoms:  [ ]All other review of systems negative     Karnofsky Performance Score/Palliative Performance Status Version 2: 20 %        http://palliative.info/resource_material/[email or web address].pdf    PHYSICAL EXAM:  Vital Signs Last 24 Hrs  T(C): 36.8 (30 Mar 2019 08:37), Max: 37.9 (29 Mar 2019 19:35)  T(F): 98.3 (30 Mar 2019 08:37), Max: 100.2 (29 Mar 2019 19:35)  HR: 54 (30 Mar 2019 08:37) (54 - 54)  BP: 101/62 (30 Mar 2019 08:37) (101/62 - 101/62)  BP(mean): --  RR: 20 (30 Mar 2019 08:37) (20 - 20)  SpO2: 97% (30 Mar 2019 08:37) (97% - 97%)    GENERAL:  [ ]Alert  [ ]Oriented x   [ ]Lethargic  [ ]Cachexia  [x ]Unarousable  [ ]Verbal  [x]Non-Verbal  Behavioral:   [ ] Anxiety  [ ] Delirium [ ] Agitation [ ] Other  HEENT:  [x ]Normal   [ ]Dry mouth   [ ]ET Tube/Trach  [ ]Oral lesions  PULMONARY:   [x ]Clear [ ]Tachypnea  [ ]Audible excessive secretions   [ ]Rhonchi        [ ]Right [ ]Left [ ]Bilateral  [ ]Crackles        [ ]Right [ ]Left [ ]Bilateral  [ ]Wheezing     [ ]Right [ ]Left [ ]Bilateral  CARDIOVASCULAR:    [x ]Regular [ ]Irregular [ ]Tachy  [ ]Bolivar [ ]Murmur [ ]Other  GASTROINTESTINAL:  [x ]Soft  [ ]Distended   [ ]+BS  [ ]Non tender [ ]Tender  [ ]PEG [ ]OGT/ NGT   Last BM:   3/29  GENITOURINARY:  [x ]Normal [ ] Incontinent   [ ]Oliguria/Anuria   [ ]Griffith  MUSCULOSKELETAL:   [x ]Normal   [ ]Weakness  [ ]Bed/Wheelchair bound [ ]Edema  NEUROLOGIC:   [x ]No focal deficits  [ ] Cognitive impairment  [ ] Dysphagia [ ]Dysarthria [ ] Paresis [ ]Other   SKIN:   [x ]Normal   [ ]Pressure ulcer(s)  [ ]Rash    CRITICAL CARE:  [ ] Shock Present  [x ]Septic [ ]Cardiogenic [ ]Neurologic [ ]Hypovolemic  [ ]  Vasopressors [ ]  Inotropes   [ ] Respiratory failure present  [ ] Acute  [ ] Chronic [ ] Hypoxic  [ ] Hypercarbic [ ] Other  [ ] Other organ failure     LABS:             no further blood draws    RADIOLOGY & ADDITIONAL STUDIES: reviewed    PROTEIN CALORIE MALNUTRITION:   [x] PPSV2 < or = 30% [ ] significant weight loss [x] poor nutritional intake [ ] anasarca [x] catabolic state   Albumin, Serum: 2.6 g/dL (03-28-19 @ 06:53)   Artificial Nutrition [ ]     REFERRALS:   [ ]Chaplaincy  [ ] Hospice  [ ]Child Life  [ ]Social Work  [ ]Case management [ ]Holistic Therapy [ ] Physical Therapy [ ] Dietary   Goals of Care Document: Goals of Care Conversation - Personal Advance Directive [JOSE ANTONIO Haskins] (03-28-19 @ 13:03)

## 2019-03-31 NOTE — PROGRESS NOTE ADULT - ASSESSMENT
64 y/o M with bioprosthetic AV & MV c/b endocarditis (on ceftriaxone and rifampin), hypothyroidism, BPH, history of seizures, and HTN, who presents to the hospital from rehab for suicidal ideations found to have AMS, JANEL, fever, all 2/2 endocarditis with suspected hemorrhagic septic emboli by MRI.  Hospital course now complicated by JANEL, fever, tachycardia concerning for severe sepsis. Pt transferred to PCU for further care

## 2019-03-31 NOTE — PROGRESS NOTE ADULT - PROBLEM SELECTOR PLAN 2
2/2 to Encephalopathy   ativan 0.5 mg a6 prn  pt required BTD x 1 this AM 2/2 to Encephalopathy   ativan 0.5 mg IV q 6 prn  pt required BTD x 1 this AM

## 2019-03-31 NOTE — PROGRESS NOTE ADULT - PROBLEM SELECTOR PLAN 1
- controlled.   - continue with Dilaudid 0.2 mg q4 standing  - continue with Dilaudid 0.3 mg prn q1  - pt did not require BTD in past 24 hrs

## 2019-03-31 NOTE — PROGRESS NOTE ADULT - PROBLEM SELECTOR PLAN 5
GOC: Decision maker is the patient's brother Alana [phone]. The designated surrogate, stated on 3/28 that even before the patient became so ill, that he was already having issues with independency that were affecting his quality of life (QOL). Alana stated that even if there was a chance for a Rx that he was not able to see how the patient was going to be able to regain independency and instead being on a position were his QOL was to be severely compromised. Alana indicated that understanding the patient's current situation that at this point GOC should be guided towards preventing and treating distressful symptoms. He agree with stopping Abx and any further work up. Will stop all other non symptoms focused meds. Patient is DNR.

## 2019-03-31 NOTE — PROGRESS NOTE ADULT - SUBJECTIVE AND OBJECTIVE BOX
GAP TEAM PALLIATIVE CARE UNIT PROGRESS NOTE:      [  ] Patient on hospice program.    INDICATION FOR PALLIATIVE CARE UNIT SERVICES: EOL care, pain, labored breathing and agitation.     INTERVAL HPI/OVERNIGHT EVENTS: Required Ativan dose x 1 for agitation this AM.    DNR on chart: Yes    Allergies    penicillin (Swelling)    Intolerances    MEDICATIONS  (STANDING):  chlorhexidine 4% Liquid 1 Application(s) Topical every 12 hours  HYDROmorphone  Injectable 0.2 milliGRAM(s) IV Push every 4 hours  levETIRAcetam  IVPB 500 milliGRAM(s) IV Intermittent every 12 hours    MEDICATIONS  (PRN):  acetaminophen  Suppository .. 650 milliGRAM(s) Rectal every 6 hours PRN Temp greater or equal to 38C (100.4F)  bisacodyl Suppository 10 milliGRAM(s) Rectal daily PRN Constipation  glycopyrrolate Injectable 0.4 milliGRAM(s) IV Push every 6 hours PRN secretions  HYDROmorphone  Injectable 0.3 milliGRAM(s) IV Push every 1 hour PRN Labored breathing or RR > 28  HYDROmorphone  Injectable 0.3 milliGRAM(s) IV Push every 1 hour PRN Moderate to Severe pain  LORazepam   Injectable 0.5 milliGRAM(s) IV Push every 6 hours PRN Agitation or recurrent labored breathing after back to back opioids are given.    ITEMS UNCHECKED ARE NOT PRESENT    PRESENT SYMPTOMS: [x ]Unable to obtain due to poor mentation   Source if other than patient:  [ ]Family   [ ]Team     Pain (Impact on QOL):    Location:       Minimal acceptable level (0-10 scale):                    Aggravating factors:  Quality:  Radiation:  Severity (0-10 scale):     Dyspnea:                           [ ]Mild [ ]Moderate [ ]Severe  Anxiety:                             [ ]Mild [x]Moderate [ ]Severe  Fatigue:                             [ ]Mild [ ]Moderate [ ]Severe  Nausea:                             [ ]Mild [ ]Moderate [ ]Severe  Loss of appetite:              [ ]Mild [ ]Moderate [ ]Severe  Constipation:                    [ ]Mild [ ]Moderate [ ]Severe    PAINAD Score:    http://geriatrictoolkit.[place].[facility]/cog/painad.pdf (Ctrl +  left click to view)  		  Other Symptoms:  [ ]All other review of systems negative     Karnofsky Performance Score/Palliative Performance Status Version 2: 10 %        http://palliative.info/resource_material/PPSv2.pdf    PHYSICAL EXAM:  Vital Signs Last 24 Hrs  T(C): 36.6 (31 Mar 2019 08:23), Max: 36.6 (31 Mar 2019 08:23)  T(F): 97.8 (31 Mar 2019 08:23), Max: 97.8 (31 Mar 2019 08:23)  HR: 70 (31 Mar 2019 08:23) (70 - 70)  BP: 130/73 (31 Mar 2019 08:23) (130/73 - 130/73)  BP(mean): --  RR: 20 (31 Mar 2019 08:23) (20 - 20)  SpO2: 99% (31 Mar 2019 08:23) (99% - 99%)    GENERAL:  [ ]Alert  [ ]Oriented x   [x]Lethargic  [ ]Cachexia  [x ]Unarousable  [ ]Verbal  [x]Non-Verbal  Behavioral:   [ ] Anxiety  [ ] Delirium [ ] Agitation [ ] Other  HEENT:  [x ]Normal   [ ]Dry mouth   [ ]ET Tube/Trach  [ ]Oral lesions  PULMONARY:   [x ]Clear [ ]Tachypnea  [ ]Audible excessive secretions   [ ]Rhonchi        [ ]Right [ ]Left [ ]Bilateral  [ ]Crackles        [ ]Right [ ]Left [ ]Bilateral  [ ]Wheezing     [ ]Right [ ]Left [ ]Bilateral  CARDIOVASCULAR:    [x ]Regular [ ]Irregular [ ]Tachy  [ ]Bolivar [ ]Murmur [ ]Other  GASTROINTESTINAL:  [x ]Soft  [ ]Distended   [ ]+BS  [ ]Non tender [ ]Tender  [ ]PEG [ ]OGT/ NGT   Last BM:   3/29  GENITOURINARY:  [x ]Normal [ ] Incontinent   [ ]Oliguria/Anuria   [ ]Grifftih  MUSCULOSKELETAL:   [x ]Normal   [ ]Weakness  [ ]Bed/Wheelchair bound [ ]Edema  NEUROLOGIC:   [x ]No focal deficits  [ ] Cognitive impairment  [ ] Dysphagia [ ]Dysarthria [ ] Paresis [ ]Other   SKIN:   [x ]Normal   [ ]Pressure ulcer(s)  [ ]Rash    CRITICAL CARE:  [ ] Shock Present  [x ]Septic [ ]Cardiogenic [ ]Neurologic [ ]Hypovolemic  [ ]  Vasopressors [ ]  Inotropes   [ ] Respiratory failure present  [ ] Acute  [ ] Chronic [ ] Hypoxic  [ ] Hypercarbic [ ] Other  [ ] Other organ failure     LABS:             no further blood draws    RADIOLOGY & ADDITIONAL STUDIES: reviewed    PROTEIN CALORIE MALNUTRITION:   [x] PPSV2 < or = 30% [ ] significant weight loss [x] poor nutritional intake [ ] anasarca [x] catabolic state   Albumin, Serum: 2.6 g/dL (03-28-19 @ 06:53)   Artificial Nutrition [ ]     REFERRALS:   [ ]Chaplaincy  [ ] Hospice  [ ]Child Life  [ ]Social Work  [ ]Case management [ ]Holistic Therapy [ ] Physical Therapy [ ] Dietary   Goals of Care Document: Goals of Care Conversation - Personal Advance Directive [JOSE ANTONIO Haskins] (03-28-19 @ 13:03) GAP TEAM PALLIATIVE CARE UNIT PROGRESS NOTE:      [  ] Patient on hospice program.    INDICATION FOR PALLIATIVE CARE UNIT SERVICES: EOL care, pain, labored breathing and agitation.     INTERVAL HPI/OVERNIGHT EVENTS: Required Ativan dose x 1 for agitation this AM.    DNR on chart: Yes    Allergies    penicillin (Swelling)    Intolerances    MEDICATIONS  (STANDING):  chlorhexidine 4% Liquid 1 Application(s) Topical every 12 hours  HYDROmorphone  Injectable 0.2 milliGRAM(s) IV Push every 4 hours  levETIRAcetam  IVPB 500 milliGRAM(s) IV Intermittent every 12 hours    MEDICATIONS  (PRN):  acetaminophen  Suppository .. 650 milliGRAM(s) Rectal every 6 hours PRN Temp greater or equal to 38C (100.4F)  bisacodyl Suppository 10 milliGRAM(s) Rectal daily PRN Constipation  glycopyrrolate Injectable 0.4 milliGRAM(s) IV Push every 6 hours PRN secretions  HYDROmorphone  Injectable 0.3 milliGRAM(s) IV Push every 1 hour PRN Labored breathing or RR > 28  HYDROmorphone  Injectable 0.3 milliGRAM(s) IV Push every 1 hour PRN Moderate to Severe pain  LORazepam   Injectable 0.5 milliGRAM(s) IV Push every 6 hours PRN Agitation or recurrent labored breathing after back to back opioids are given.    ITEMS UNCHECKED ARE NOT PRESENT    PRESENT SYMPTOMS: [x ]Unable to obtain due to poor mentation   Source if other than patient:  [ ]Family   [ ]Team     Pain (Impact on QOL):    Location:       Minimal acceptable level (0-10 scale):                    Aggravating factors:  Quality:  Radiation:  Severity (0-10 scale):     Dyspnea:                           [ ]Mild [ ]Moderate [ ]Severe  Anxiety:                             [ ]Mild [x]Moderate [ ]Severe  Fatigue:                             [ ]Mild [ ]Moderate [ ]Severe  Nausea:                             [ ]Mild [ ]Moderate [ ]Severe  Loss of appetite:              [ ]Mild [ ]Moderate [ ]Severe  Constipation:                    [ ]Mild [ ]Moderate [ ]Severe    PAINAD Score:    http://geriatrictoolkit.[place].[email or web address]/cog/painad.pdf (Ctrl +  left click to view)  		  Other Symptoms:  [ ]All other review of systems negative     Karnofsky Performance Score/Palliative Performance Status Version 2: 10 %        http://palliative.info/resource_material/PPSv2.pdf    PHYSICAL EXAM:  Vital Signs Last 24 Hrs  T(C): 36.6 (31 Mar 2019 08:23), Max: 36.6 (31 Mar 2019 08:23)  T(F): 97.8 (31 Mar 2019 08:23), Max: 97.8 (31 Mar 2019 08:23)  HR: 70 (31 Mar 2019 08:23) (70 - 70)  BP: 130/73 (31 Mar 2019 08:23) (130/73 - 130/73)  BP(mean): --  RR: 20 (31 Mar 2019 08:23) (20 - 20)  SpO2: 99% (31 Mar 2019 08:23) (99% - 99%)    GENERAL:  [ ]Alert  [ ]Oriented x   [x]Lethargic  [ ]Cachexia  [x ]Unarousable  [ ]Verbal  [x]Non-Verbal  Behavioral:   [ ] Anxiety  [ ] Delirium [ ] Agitation [ ] Other  HEENT:  [x ]Normal   [ ]Dry mouth   [ ]ET Tube/Trach  [ ]Oral lesions  PULMONARY:   [x ]Clear [ ]Tachypnea  [ ]Audible excessive secretions   [ ]Rhonchi        [ ]Right [ ]Left [ ]Bilateral  [ ]Crackles        [ ]Right [ ]Left [ ]Bilateral  [ ]Wheezing     [ ]Right [ ]Left [ ]Bilateral  CARDIOVASCULAR:    [x ]Regular [ ]Irregular [ ]Tachy  [ ]Bolivar [ ]Murmur [ ]Other  GASTROINTESTINAL:  [x ]Soft  [ ]Distended   [ ]+BS  [ ]Non tender [ ]Tender  [ ]PEG [ ]OGT/ NGT   Last BM:   3/29  GENITOURINARY:  [x ]Normal [ ] Incontinent   [ ]Oliguria/Anuria   [ ]Griffith  MUSCULOSKELETAL:   [x ]Normal   [ ]Weakness  [ ]Bed/Wheelchair bound [ ]Edema  NEUROLOGIC:   [ ]No focal deficits  [ x] Cognitive impairment  [ ] Dysphagia [ ]Dysarthria [ ] Paresis [x ]Other: Lethargic. Not able to f/u any commands.   SKIN:   [x ]Normal   [ ]Pressure ulcer(s)  [ ]Rash    CRITICAL CARE:  [ ] Shock Present  [x ]Septic [ ]Cardiogenic [ ]Neurologic [ ]Hypovolemic  [ ]  Vasopressors [ ]  Inotropes   [ ] Respiratory failure present  [ ] Acute  [ ] Chronic [ ] Hypoxic  [ ] Hypercarbic [ ] Other  [ ] Other organ failure     LABS:             no further blood draws    RADIOLOGY & ADDITIONAL STUDIES: reviewed    PROTEIN CALORIE MALNUTRITION:   [x] PPSV2 < or = 30% [ ] significant weight loss [x] poor nutritional intake [ ] anasarca [x] catabolic state   Albumin, Serum: 2.6 g/dL (03-28-19 @ 06:53)   Artificial Nutrition [ ]     REFERRALS:   [ ]Chaplaincy  [ ] Hospice  [ ]Child Life  [ ]Social Work  [ ]Case management [ ]Holistic Therapy [ ] Physical Therapy [ ] Dietary   Goals of Care Document: Goals of Care Conversation - Personal Advance Directive [JOSE ANTONIO Haskins] (03-28-19 @ 13:03)

## 2019-04-01 NOTE — PROGRESS NOTE ADULT - PROBLEM SELECTOR PLAN 2
2/2 to Encephalopathy likely secondary to septic emboli.   ativan 0.5 mg IV q 6 prn  One given earlier yesterday afternoon and one dose early this AM.

## 2019-04-01 NOTE — CHART NOTE - NSCHARTNOTEFT_GEN_A_CORE
Per discussion with RN pt is not appropriate for nutrition assessment at this time, RD to remain available as needed.    Lucita Mayen R.D., [facility], Pager #[phone]

## 2019-04-01 NOTE — PROGRESS NOTE ADULT - ASSESSMENT
62 y/o M with bioprosthetic AV & MV c/b endocarditis (on ceftriaxone and rifampin), hypothyroidism, BPH, history of seizures, and HTN, who presents to the hospital from rehab for suicidal ideations found to have AMS, JANEL, fever, all 2/2 endocarditis with suspected hemorrhagic septic emboli by MRI.  Hospital course now complicated by JANEL, fever, tachycardia concerning for severe sepsis. Pt transferred to PCU for further care.    Increased use of prn Dilaudid despite ATC administration. Dilaudid drip started at 0.2 mg/hour. PRN dose increased to 0.5 mg. Lorazepam given x 2 for agitation. Spoke with Melissa Saavedra from [facility] and he will be evaluated for inpatient hospice. Continue to manage and monitor symptoms.

## 2019-04-01 NOTE — PROGRESS NOTE ADULT - PROBLEM SELECTOR PLAN 4
Goals are for full comfort. No further blood draws or work up. JANEL likely secondary sepsis secondary to endocarditis.

## 2019-04-01 NOTE — PROGRESS NOTE ADULT - PROBLEM SELECTOR PLAN 5
GOC: Decision maker is the patient's brother Alana [phone]. The designated surrogate, stated on 3/28 that even before the patient became so ill, that he was already having issues with independency that were affecting his quality of life (QOL). Alana stated that even if there was a chance for a Rx that he was not able to see how the patient was going to be able to regain independency and instead being on a position were his QOL was to be severely compromised. Alana indicated that understanding the patient's current situation that at this point GOC should be guided towards preventing and treating distressful symptoms. He agree with stopping Abx and any further work up. Will stop all other non symptoms focused meds. Patient is DNR.    Hospice referral initiated today.

## 2019-04-01 NOTE — PROGRESS NOTE ADULT - SUBJECTIVE AND OBJECTIVE BOX
GAP TEAM PALLIATIVE CARE UNIT PROGRESS NOTE:      [  ] Patient on hospice program.    INDICATION FOR PALLIATIVE CARE UNIT SERVICES: EOL care, pain, labored breathing and agitation.     INTERVAL HPI/OVERNIGHT EVENTS: Dilaudid given x 2 prn in addition to the q 4 hours ATC. Drip started at 0.2 mg/hour. PRN dose increased to 0.5 mg. Lorazepam given x 1 prn. Patient minimally responsive with some moaning. Will continue to monitor effectiveness of medication. In touch with [facility] and patient will be evaluated for transfer to inpatient hospice.    DNR on chart: Yes    Allergies    penicillin (Swelling)    Intolerances    MEDICATIONS  (STANDING):  chlorhexidine 4% Liquid 1 Application(s) Topical two times a day  HYDROmorphone Infusion 0.2 mG/Hr (0.2 mL/Hr) IV Continuous <Continuous>  levETIRAcetam  IVPB 500 milliGRAM(s) IV Intermittent every 12 hours    MEDICATIONS  (PRN):  acetaminophen  Suppository .. 650 milliGRAM(s) Rectal every 6 hours PRN Temp greater or equal to 38C (100.4F)  bisacodyl Suppository 10 milliGRAM(s) Rectal daily PRN Constipation  glycopyrrolate Injectable 0.4 milliGRAM(s) IV Push every 6 hours PRN secretions  HYDROmorphone  Injectable 0.5 milliGRAM(s) IV Push every 1 hour PRN Labored breathing or RR > 28  HYDROmorphone  Injectable 0.5 milliGRAM(s) IV Push every 1 hour PRN Moderate to Severe pain  LORazepam   Injectable 0.5 milliGRAM(s) IV Push every 1 hour PRN Agitation or recurrent labored breathing after back to back opioids are given.        ITEMS UNCHECKED ARE NOT PRESENT    PRESENT SYMPTOMS: [x ]Unable to obtain due to poor mentation   Source if other than patient:  [ ]Family   [ ]Team     Pain (Impact on QOL):    Location:       Minimal acceptable level (0-10 scale):                    Aggravating factors:  Quality:  Radiation:  Severity (0-10 scale):     Dyspnea:                           [ ]Mild [ ]Moderate [ ]Severe  Anxiety:                             [ ]Mild [x]Moderate [ ]Severe  Fatigue:                             [ ]Mild [ ]Moderate [ ]Severe  Nausea:                             [ ]Mild [ ]Moderate [ ]Severe  Loss of appetite:              [ ]Mild [ ]Moderate [ ]Severe  Constipation:                    [ ]Mild [ ]Moderate [ ]Severe    PAINAD Score: 4    [email or web address]/painad.pdf (Ctrl +  left click to view)  		  Other Symptoms:  [ ]All other review of systems negative     Karnofsky Performance Score/Palliative Performance Status Version 2: 10 %        http://palliative.info/resource_material/PPSv2.pdf    PHYSICAL EXAM:  Vital Signs Last 24 Hrs  T(C): 36.2 (01 Apr 2019 09:15), Max: 36.2 (01 Apr 2019 09:15)  T(F): 97.1 (01 Apr 2019 09:15), Max: 97.1 (01 Apr 2019 09:15)  HR: 72 (01 Apr 2019 09:15) (72 - 72)  BP: 126/80 (01 Apr 2019 09:15) (126/80 - 126/80)  BP(mean): --  RR: 20 (01 Apr 2019 09:15) (20 - 20)  SpO2: 99% (01 Apr 2019 09:15) (99% - 99%)    GENERAL:  [ ]Alert  [ ]Oriented x   [x]Lethargic  [ ]Cachexia  [x ]Unarousable  [ ]Verbal  [x]Non-Verbal  Behavioral:   [ ] Anxiety  [ ] Delirium [ ] Agitation [ ] Other  HEENT:  [x ]Normal   [ ]Dry mouth   [ ]ET Tube/Trach  [ ]Oral lesions  PULMONARY:   [x ]Clear [ ]Tachypnea  [ ]Audible excessive secretions   [ ]Rhonchi        [ ]Right [ ]Left [ ]Bilateral  [ ]Crackles        [ ]Right [ ]Left [ ]Bilateral  [ ]Wheezing     [ ]Right [ ]Left [ ]Bilateral  CARDIOVASCULAR:    [x ]Regular [ ]Irregular [ ]Tachy  [ ]Bolivar [ ]Murmur [ ]Other  GASTROINTESTINAL:  [x ]Soft  [ ]Distended   [ ]+BS  [ ]Non tender [ ]Tender  [ ]PEG [ ]OGT/ NGT   Last BM:   3/29  GENITOURINARY:  [x ]Normal [ ] Incontinent   [ ]Oliguria/Anuria   [ ]Griffith  MUSCULOSKELETAL:   [x ]Normal   [ ]Weakness  [ ]Bed/Wheelchair bound [ ]Edema  NEUROLOGIC:   [ ]No focal deficits  [ x] Cognitive impairment  [ ] Dysphagia [ ]Dysarthria [ ] Paresis [x ]Other: Lethargic. Not able to f/u any commands.   SKIN:   [x ]Normal   [ ]Pressure ulcer(s)  [ ]Rash    CRITICAL CARE:  [ ] Shock Present  [x ]Septic [ ]Cardiogenic [ ]Neurologic [ ]Hypovolemic  [ ]  Vasopressors [ ]  Inotropes   [ ] Respiratory failure present  [ ] Acute  [ ] Chronic [ ] Hypoxic  [ ] Hypercarbic [ ] Other  [ ] Other organ failure     LABS:             no further blood draws    RADIOLOGY & ADDITIONAL STUDIES: reviewed    PROTEIN CALORIE MALNUTRITION:   [x] PPSV2 < or = 30% [ ] significant weight loss [x] poor nutritional intake [ ] anasarca [x] catabolic state   Albumin, Serum: 2.6 g/dL (03-28-19 @ 06:53)   Artificial Nutrition [ ]     REFERRALS:   [ ]Chaplaincy  [x ] Hospice  [ ]Child Life  [ x]Social Work  [ ]Case management [ ]Holistic Therapy [ ] Physical Therapy [ ] Dietary   Goals of Care Document: Goals of Care Conversation - Personal Advance Directive [JOSE ANTONIO Haskins] (03-28-19 @ 13:03) GAP TEAM PALLIATIVE CARE UNIT PROGRESS NOTE:      [  ] Patient on hospice program.    INDICATION FOR PALLIATIVE CARE UNIT SERVICES: EOL care, pain, labored breathing and agitation.     INTERVAL HPI/OVERNIGHT EVENTS: Dilaudid given x 2 prn in addition to the q 4 hours ATC. Drip started at 0.2 mg/hour. PRN dose increased to 0.5 mg. Lorazepam given x 1 prn. Patient minimally responsive with some moaning. Will continue to monitor effectiveness of medication. In touch with [facility] and patient will be evaluated for transfer to inpatient hospice.    DNR on chart: Yes    Allergies    penicillin (Swelling)    Intolerances    MEDICATIONS  (STANDING):  chlorhexidine 4% Liquid 1 Application(s) Topical two times a day  HYDROmorphone Infusion 0.2 mG/Hr (0.2 mL/Hr) IV Continuous <Continuous>  levETIRAcetam  IVPB 500 milliGRAM(s) IV Intermittent every 12 hours    MEDICATIONS  (PRN):  acetaminophen  Suppository .. 650 milliGRAM(s) Rectal every 6 hours PRN Temp greater or equal to 38C (100.4F)  bisacodyl Suppository 10 milliGRAM(s) Rectal daily PRN Constipation  glycopyrrolate Injectable 0.4 milliGRAM(s) IV Push every 6 hours PRN secretions  HYDROmorphone  Injectable 0.5 milliGRAM(s) IV Push every 1 hour PRN Labored breathing or RR > 28  HYDROmorphone  Injectable 0.5 milliGRAM(s) IV Push every 1 hour PRN Moderate to Severe pain  LORazepam   Injectable 0.5 milliGRAM(s) IV Push every 1 hour PRN Agitation or recurrent labored breathing after back to back opioids are given.        ITEMS UNCHECKED ARE NOT PRESENT    PRESENT SYMPTOMS: [x ]Unable to obtain due to poor mentation   Source if other than patient:  [ ]Family   [ ]Team     Pain (Impact on QOL):    Location:       Minimal acceptable level (0-10 scale):                    Aggravating factors:  Quality:  Radiation:  Severity (0-10 scale):     Dyspnea:                           [ ]Mild [ ]Moderate [ ]Severe  Anxiety:                             [ ]Mild [x]Moderate [ ]Severe  Fatigue:                             [ ]Mild [ ]Moderate [ ]Severe  Nausea:                             [ ]Mild [ ]Moderate [ ]Severe  Loss of appetite:              [ ]Mild [ ]Moderate [ ]Severe  Constipation:                    [ ]Mild [ ]Moderate [ ]Severe    PAINAD Score: 4    [email or web address]/painad.pdf (Ctrl +  left click to view)  		  Other Symptoms:  [ ]All other review of systems negative     Karnofsky Performance Score/Palliative Performance Status Version 2: 10 %        http://palliative.info/resource_material/PPSv2.pdf    PHYSICAL EXAM:  Vital Signs Last 24 Hrs  T(C): 36.2 (01 Apr 2019 09:15), Max: 36.2 (01 Apr 2019 09:15)  T(F): 97.1 (01 Apr 2019 09:15), Max: 97.1 (01 Apr 2019 09:15)  HR: 72 (01 Apr 2019 09:15) (72 - 72)  BP: 126/80 (01 Apr 2019 09:15) (126/80 - 126/80)  BP(mean): --  RR: 20 (01 Apr 2019 09:15) (20 - 20)  SpO2: 99% (01 Apr 2019 09:15) (99% - 99%)    GENERAL:  [ ]Alert  [ ]Oriented x   [x]Lethargic  [ ]Cachexia  [x ]Unarousable  [ ]Verbal  [x]Non-Verbal  Behavioral:   [ ] Anxiety  [ ] Delirium [ ] Agitation [ ] Other  HEENT:  [x ]Normal   [ ]Dry mouth   [ ]ET Tube/Trach  [ ]Oral lesions  PULMONARY:   [x ]Clear [ ]Tachypnea  [ ]Audible excessive secretions   [ ]Rhonchi        [ ]Right [ ]Left [ ]Bilateral  [ ]Crackles        [ ]Right [ ]Left [ ]Bilateral  [ ]Wheezing     [ ]Right [ ]Left [ ]Bilateral  CARDIOVASCULAR:    [x ]Regular [ ]Irregular [ ]Tachy  [ ]Bolivar [ ]Murmur [ ]Other +S1 +S2   GASTROINTESTINAL:  [x ]Soft  [ ]Distended   [ ]+BS  [ ]Non tender [ ]Tender  [ ]PEG [ ]OGT/ NGT   Last BM:   3/29  GENITOURINARY:  [x ]Normal [ ] Incontinent   [ ]Oliguria/Anuria   [ ]Griffith  MUSCULOSKELETAL:   [x ]Normal   [ ]Weakness  [ ]Bed/Wheelchair bound [ ]Edema  NEUROLOGIC:   [ ]No focal deficits  [ x] Cognitive impairment  [ ] Dysphagia [ ]Dysarthria [ ] Paresis [x ]Other: Lethargic. Not able to f/u any commands.   SKIN:   [x ]Normal   [ ]Pressure ulcer(s)  [ ]Rash    CRITICAL CARE:  [ ] Shock Present  [x ]Septic [ ]Cardiogenic [ ]Neurologic [ ]Hypovolemic  [ ]  Vasopressors [ ]  Inotropes   [ ] Respiratory failure present  [ ] Acute  [ ] Chronic [ ] Hypoxic  [ ] Hypercarbic [ ] Other  [ ] Other organ failure     LABS:             no further blood draws    RADIOLOGY & ADDITIONAL STUDIES: reviewed    PROTEIN CALORIE MALNUTRITION:   [x] PPSV2 < or = 30% [ ] significant weight loss [x] poor nutritional intake [ ] anasarca [x] catabolic state   Albumin, Serum: 2.6 g/dL (03-28-19 @ 06:53)   Artificial Nutrition [ ]     REFERRALS:   [ ]Chaplaincy  [x ] Hospice  [ ]Child Life  [ x]Social Work  [ ]Case management [ ]Holistic Therapy [ ] Physical Therapy [ ] Dietary   Goals of Care Document: Goals of Care Conversation - Personal Advance Directive [JOSE ANTONIO Haskins] (03-28-19 @ 13:03)

## 2019-04-01 NOTE — PROGRESS NOTE ADULT - PROBLEM SELECTOR PLAN 1
Increased pain over the past 24 hours. Dilaudid drip started at 0.2 mg/hour. PRN dose increased to 0.5 mg. Will monitor effectiveness.

## 2019-04-02 NOTE — PROGRESS NOTE ADULT - PROBLEM SELECTOR PLAN 1
Increased pain over the past 24 hours. Dilaudid drip started at 0.2 mg/hour. PRN dose increased to 0.5 mg. Will monitor effectiveness. Increased pain over the past 24 hours. Dilaudid drip increased to 0.5 mg/hour. PRN dose increased to 1 mg. Will monitor effectiveness.

## 2019-04-02 NOTE — GOALS OF CARE CONVERSATION - PERSONAL ADVANCE DIRECTIVE - NS PRO AD PATIENT TYPE
Medical Orders for Life-Sustaining Treatment (MOLST)/Do Not Resuscitate (DNR)/Health Care Proxy (HCP)
Do Not Resuscitate (DNR)/Health Care Proxy (HCP)/Medical Orders for Life-Sustaining Treatment (MOLST)

## 2019-04-02 NOTE — PROGRESS NOTE ADULT - PROBLEM SELECTOR PLAN 2
2/2 to Encephalopathy likely secondary to septic emboli.   ativan 0.5 mg IV q 6 prn  One given earlier yesterday afternoon and one dose early this AM. 2/2 to Encephalopathy likely secondary to septic emboli.   ativan 0.5 mg IV q 6 prn  Lorazepam used x 2 in past 24 hours.

## 2019-04-02 NOTE — GOALS OF CARE CONVERSATION - PERSONAL ADVANCE DIRECTIVE - CONVERSATION DETAILS
Decision maker is the patient's brother as above. The primary team (Dr Mo) and myself d/w Alana about the patient's current medical issues (Advnaced HF, Endocarditis with possible Septic Embolization, and Severe JANEL)  and limited Rx options ( Mainly continuing ABx, IVF, and doing a further work up). The designated surrogate, stated that even before the patient became so ill, that he was already having issues with independency that were affecting his quality of life (QOL). Alana stated that even if there was a chance for a Rx that he was not able to see how the patient was going to be able to regain independency and instead being on a position were his QOL was to be severely compromised. Alana indicated that understanding the patient's current situation that at this point GOC should be guided towards preventing and treating distressful symptoms. HE agree with stopping Abx and any further work up. Will stop all other non symptoms focused meds. Will transfer to PCU for advanced symptoms monitoring and Rx.   Patient is already DNR.     30' were spent in ACP.
Spoke with pt's brother Alana who requested to meet tomorrow 4/3 at 11 a.m. to discuss hospice consents.  HINA Antony and LUCINA Carranza made aware.

## 2019-04-02 NOTE — PROGRESS NOTE ADULT - PROBLEM SELECTOR PLAN 3
Based on [facility] Abx not offered at this time, Tylenol prn for fever.

## 2019-04-02 NOTE — PROGRESS NOTE ADULT - ASSESSMENT
62 y/o M with bioprosthetic AV & MV c/b endocarditis (on ceftriaxone and rifampin), hypothyroidism, BPH, history of seizures, and HTN, who presents to the hospital from rehab for suicidal ideations found to have AMS, JANEL, fever, all 2/2 endocarditis with suspected hemorrhagic septic emboli by MRI.  Hospital course now complicated by JANEL, fever, tachycardia concerning for severe sepsis. Pt transferred to PCU for further care.    Increased use of prn Dilaudid despite ATC administration. Dilaudid drip started at 0.2 mg/hour. PRN dose increased to 0.5 mg. Lorazepam given x 2 for agitation. Spoke with Melissa Saavedra from [facility] and he will be evaluated for inpatient hospice. Continue to manage and monitor symptoms. 62 y/o M with bioprosthetic AV & MV c/b endocarditis (on ceftriaxone and rifampin), hypothyroidism, BPH, history of seizures, and HTN, who presents to the hospital from rehab for suicidal ideations found to have AMS, JANEL, fever, all 2/2 endocarditis with suspected hemorrhagic septic emboli by MRI.  Hospital course now complicated by JANEL, fever, tachycardia concerning for severe sepsis. Pt transferred to PCU for further care.    Use of prn Dilaudid x 5 in past 24 hours and Lorazepam x 2. Dilaudid drip increased to 0.5 mg/hour and prn dose increased to 1 mg/hour. Will continue to monitor and manage symptoms. Patient accepted for inpatient hospice services with [facility]. Plan for transfer to [facility] after consents obtained by brother.

## 2019-04-02 NOTE — PROGRESS NOTE ADULT - SUBJECTIVE AND OBJECTIVE BOX
GAP TEAM PALLIATIVE CARE UNIT PROGRESS NOTE:      [  ] Patient on hospice program.    INDICATION FOR PALLIATIVE CARE UNIT SERVICES: EOL care, pain, labored breathing and agitation.     INTERVAL HPI/OVERNIGHT EVENTS:     DNR on chart: Yes    Allergies    penicillin (Swelling)    Intolerances    MEDICATIONS  (STANDING):  chlorhexidine 4% Liquid 1 Application(s) Topical two times a day  HYDROmorphone Infusion 0.2 mG/Hr (0.2 mL/Hr) IV Continuous <Continuous>  levETIRAcetam  IVPB 500 milliGRAM(s) IV Intermittent every 12 hours    MEDICATIONS  (PRN):  acetaminophen  Suppository .. 650 milliGRAM(s) Rectal every 6 hours PRN Temp greater or equal to 38C (100.4F)  bisacodyl Suppository 10 milliGRAM(s) Rectal daily PRN Constipation  glycopyrrolate Injectable 0.4 milliGRAM(s) IV Push every 6 hours PRN secretions  HYDROmorphone  Injectable 0.5 milliGRAM(s) IV Push every 1 hour PRN Labored breathing or RR > 28  HYDROmorphone  Injectable 0.5 milliGRAM(s) IV Push every 1 hour PRN Moderate to Severe pain  LORazepam   Injectable 0.5 milliGRAM(s) IV Push every 1 hour PRN Agitation or recurrent labored breathing after back to back opioids are given.        ITEMS UNCHECKED ARE NOT PRESENT    PRESENT SYMPTOMS: [x ]Unable to obtain due to poor mentation   Source if other than patient:  [ ]Family   [ ]Team     Pain (Impact on QOL):    Location:       Minimal acceptable level (0-10 scale):                    Aggravating factors:  Quality:  Radiation:  Severity (0-10 scale):     Dyspnea:                           [ ]Mild [ ]Moderate [ ]Severe  Anxiety:                             [ ]Mild [x]Moderate [ ]Severe  Fatigue:                             [ ]Mild [ ]Moderate [ ]Severe  Nausea:                             [ ]Mild [ ]Moderate [ ]Severe  Loss of appetite:              [ ]Mild [ ]Moderate [ ]Severe  Constipation:                    [ ]Mild [ ]Moderate [ ]Severe    PAINAD Score: 4    http://geriatrictoolkit.[email or web address]/painad.pdf (Ctrl +  left click to view)  		  Other Symptoms:  [ ]All other review of systems negative     Karnofsky Performance Score/Palliative Performance Status Version 2: 10 %        http://palliative.info/resource_material/[email or web address].pdf    PHYSICAL EXAM:  Vital Signs Last 24 Hrs  T(C): 36.2 (01 Apr 2019 09:15), Max: 36.2 (01 Apr 2019 09:15)  T(F): 97.1 (01 Apr 2019 09:15), Max: 97.1 (01 Apr 2019 09:15)  HR: 72 (01 Apr 2019 09:15) (72 - 72)  BP: 126/80 (01 Apr 2019 09:15) (126/80 - 126/80)  BP(mean): --  RR: 20 (01 Apr 2019 09:15) (20 - 20)  SpO2: 99% (01 Apr 2019 09:15) (99% - 99%)    GENERAL:  [ ]Alert  [ ]Oriented x   [x]Lethargic  [ ]Cachexia  [x ]Unarousable  [ ]Verbal  [x]Non-Verbal  Behavioral:   [ ] Anxiety  [ ] Delirium [ ] Agitation [ ] Other  HEENT:  [x ]Normal   [ ]Dry mouth   [ ]ET Tube/Trach  [ ]Oral lesions  PULMONARY:   [x ]Clear [ ]Tachypnea  [ ]Audible excessive secretions   [ ]Rhonchi        [ ]Right [ ]Left [ ]Bilateral  [ ]Crackles        [ ]Right [ ]Left [ ]Bilateral  [ ]Wheezing     [ ]Right [ ]Left [ ]Bilateral  CARDIOVASCULAR:    [x ]Regular [ ]Irregular [ ]Tachy  [ ]Bolivar [ ]Murmur [ ]Other +S1 +S2   GASTROINTESTINAL:  [x ]Soft  [ ]Distended   [ ]+BS  [ ]Non tender [ ]Tender  [ ]PEG [ ]OGT/ NGT   Last BM:   3/29  GENITOURINARY:  [x ]Normal [ ] Incontinent   [ ]Oliguria/Anuria   [ ]Griffith  MUSCULOSKELETAL:   [x ]Normal   [ ]Weakness  [ ]Bed/Wheelchair bound [ ]Edema  NEUROLOGIC:   [ ]No focal deficits  [ x] Cognitive impairment  [ ] Dysphagia [ ]Dysarthria [ ] Paresis [x ]Other: Lethargic. Not able to f/u any commands.   SKIN:   [x ]Normal   [ ]Pressure ulcer(s)  [ ]Rash    CRITICAL CARE:  [ ] Shock Present  [x ]Septic [ ]Cardiogenic [ ]Neurologic [ ]Hypovolemic  [ ]  Vasopressors [ ]  Inotropes   [ ] Respiratory failure present  [ ] Acute  [ ] Chronic [ ] Hypoxic  [ ] Hypercarbic [ ] Other  [ ] Other organ failure     LABS:             no further blood draws    RADIOLOGY & ADDITIONAL STUDIES: reviewed    PROTEIN CALORIE MALNUTRITION:   [x] PPSV2 < or = 30% [ ] significant weight loss [x] poor nutritional intake [ ] anasarca [x] catabolic state   Albumin, Serum: 2.6 g/dL (03-28-19 @ 06:53)   Artificial Nutrition [ ]     REFERRALS:   [ ]Chaplaincy  [x ] Hospice  [ ]Child Life  [ x]Social Work  [ ]Case management [ ]Holistic Therapy [ ] Physical Therapy [ ] Dietary   Goals of Care Document: Goals of Care Conversation - Personal Advance Directive [JOSE ANTONIO Haskins] (03-28-19 @ 13:03) GAP TEAM PALLIATIVE CARE UNIT PROGRESS NOTE:      [  ] Patient on hospice program.    INDICATION FOR PALLIATIVE CARE UNIT SERVICES: EOL care, pain, labored breathing and agitation.     INTERVAL HPI/OVERNIGHT EVENTS: Required prn Dilaudid x 5 and Lorazepam x 2 in past 24 hours. Dilaudid gtt increased to 0.5 mg/hour and prn dose increased to 1 mg/hour. Patient [facility]. Awaiting brother to come sign consents.    DNR on chart: Yes    Allergies    penicillin (Swelling)    Intolerances    MEDICATIONS  (STANDING):  chlorhexidine 4% Liquid 1 Application(s) Topical two times a day  HYDROmorphone Infusion 0.5 mG/Hr (0.5 mL/Hr) IV Continuous <Continuous>  levETIRAcetam  IVPB 500 milliGRAM(s) IV Intermittent every 12 hours  sodium chloride 0.9%. 1000 milliLiter(s) (10 mL/Hr) IV Continuous <Continuous>    MEDICATIONS  (PRN):  acetaminophen  Suppository .. 650 milliGRAM(s) Rectal every 6 hours PRN Temp greater or equal to 38C (100.4F)  bisacodyl Suppository 10 milliGRAM(s) Rectal daily PRN Constipation  glycopyrrolate Injectable 0.4 milliGRAM(s) IV Push every 6 hours PRN secretions  HYDROmorphone  Injectable 1 milliGRAM(s) IV Push every 1 hour PRN Labored breathing or RR > 28  HYDROmorphone  Injectable 1 milliGRAM(s) IV Push every 1 hour PRN Moderate to Severe pain  LORazepam   Injectable 0.5 milliGRAM(s) IV Push every 1 hour PRN Agitation or recurrent labored breathing after back to back opioids are given.      ITEMS UNCHECKED ARE NOT PRESENT    PRESENT SYMPTOMS: [x ]Unable to obtain due to poor mentation   Source if other than patient:  [ ]Family   [ ]Team     Pain (Impact on QOL):    Location:       Minimal acceptable level (0-10 scale):                    Aggravating factors:  Quality:  Radiation:  Severity (0-10 scale):     Dyspnea:                           [ ]Mild [ ]Moderate [ ]Severe  Anxiety:                             [ ]Mild [x]Moderate [ ]Severe  Fatigue:                             [ ]Mild [ ]Moderate [ ]Severe  Nausea:                             [ ]Mild [ ]Moderate [ ]Severe  Loss of appetite:              [ ]Mild [ ]Moderate [ ]Severe  Constipation:                    [ ]Mild [ ]Moderate [ ]Severe    PAINAD Score: 4    http://geriatrictoolkit.[place].[facility]/cog/painad.pdf (Ctrl +  left click to view)  		  Other Symptoms:  [ ]All other review of systems negative     Karnofsky Performance Score/Palliative Performance Status Version 2: 10 %        http://palliative.info/resource_material/PPSv2.pdf    PHYSICAL EXAM:  Vital Signs Last 24 Hrs  T(C): 35.9 (02 Apr 2019 08:30), Max: 35.9 (02 Apr 2019 08:30)  T(F): 96.7 (02 Apr 2019 08:30), Max: 96.7 (02 Apr 2019 08:30)  HR: 83 (02 Apr 2019 08:30) (83 - 83)  BP: 161/84 (02 Apr 2019 08:30) (161/84 - 161/84)  BP(mean): --  RR: 20 (02 Apr 2019 08:30) (20 - 20)  SpO2: 96% (02 Apr 2019 08:30) (96% - 96%)    GENERAL:  [ ]Alert  [ ]Oriented x   [x]Lethargic  [ ]Cachexia  [x ]Unarousable  [ ]Verbal  [x]Non-Verbal  Behavioral:   [ ] Anxiety  [ ] Delirium [ ] Agitation [ ] Other  HEENT:  [x ]Normal   [ ]Dry mouth   [ ]ET Tube/Trach  [ ]Oral lesions  PULMONARY:   [x ]Clear [ ]Tachypnea  [ ]Audible excessive secretions   [ ]Rhonchi        [ ]Right [ ]Left [ ]Bilateral  [ ]Crackles        [ ]Right [ ]Left [ ]Bilateral  [ ]Wheezing     [ ]Right [ ]Left [ ]Bilateral  CARDIOVASCULAR:    [x ]Regular [ ]Irregular [ ]Tachy  [ ]Bolivar [ ]Murmur [ ]Other +S1 +S2   GASTROINTESTINAL:  [x ]Soft  [ ]Distended   [ ]+BS  [ ]Non tender [ ]Tender  [ ]PEG [ ]OGT/ NGT   Last BM:   4/1/19  GENITOURINARY:  [x ]Normal [ ] Incontinent   [ ]Oliguria/Anuria   [ ]Griffith  MUSCULOSKELETAL:   [x ]Normal   [ ]Weakness  [ ]Bed/Wheelchair bound [ ]Edema  NEUROLOGIC:   [ ]No focal deficits  [ x] Cognitive impairment  [ ] Dysphagia [ ]Dysarthria [ ] Paresis [x ]Other: Lethargic. Not able to f/u any commands.   SKIN:   [x ]Normal   [ ]Pressure ulcer(s)  [ ]Rash    CRITICAL CARE:  [ ] Shock Present  [x ]Septic [ ]Cardiogenic [ ]Neurologic [ ]Hypovolemic  [ ]  Vasopressors [ ]  Inotropes   [ ] Respiratory failure present  [ ] Acute  [ ] Chronic [ ] Hypoxic  [ ] Hypercarbic [ ] Other  [ ] Other organ failure     LABS:             no further blood draws    RADIOLOGY & ADDITIONAL STUDIES: reviewed    PROTEIN CALORIE MALNUTRITION:   [x] PPSV2 < or = 30% [ ] significant weight loss [x] poor nutritional intake [ ] anasarca [x] catabolic state   Albumin, Serum: 2.6 g/dL (03-28-19 @ 06:53)   Artificial Nutrition [ ]     REFERRALS:   [ ]Chaplaincy  [x ] Hospice  [ ]Child Life  [ x]Social Work  [ ]Case management [ ]Holistic Therapy [ ] Physical Therapy [ ] Dietary   Goals of Care Document: Goals of Care Conversation - Personal Advance Directive [JOSE ANTONIO Haskins] (03-28-19 @ 13:03)

## 2019-04-02 NOTE — PROGRESS NOTE ADULT - ATTENDING COMMENTS
Aris Wells MD  Pager [phone]  After 5pm/weekends call [phone]
Aris Wells MD  Pager [phone]  After 5pm/weekends call [phone]
Aris Wells MD  Pager [phone]  After 5pm/weekends call [phone]
Aris Wells MD  Pager [phone]  After 5pm/weekends call [phone]
Aris Wells MD  Pager [phone]  After 5pm/weekends call [phone]
Aris Wells MD  Pager [phone]  After 5pm/weekends call [phone]
Aris Wells MD  Pager [phone]  After 5pm/weekends call [phone]
Aris Wells MD  Pager [phone]  After 5pm/weekends call [phone]
Aris Wells MD  Pager [phone]  After 5pm/weekends call [phone]
Agree with plan as outlined above.  Concerning prosthetic valve endocarditis.  Awaiting CT surgery input following CARLY
CARLY with 3cm vegetation on mitral valve  CTS f/u
bioprosthetic mirtal valve endocarditis  continue with vancomycin, rifampin, cefipime  CARLY  MRI head/neck MRA.head  CARLY, will need cerebral angiogram prior to r/o mycotic aneurysm   tsh noted, continue with current dose, repeat in 6 weeks after hospitalization
- Hypothyroidism: elevated TSH and low T4, Synthroid dose increased to 200 mcg.   - Septic emboli:  MR brain & MRA H&N shows signs of septic emboli with area of hemorrhagic transformation, f/u Neuro recs   - if febrile obtain will obtain cx. d/w Dr. Villalobos.
continue with telemetry  continue with abx for prosthetic valve endocarditis  follow up CTS, if no plans for surgery then we need to have a GOC discussion with the family  monitor plts
medical management alone at this time  repeat random level vanc trough in am  will need VIANCA
I have personally seen and examined this patient and agree with the above assessment and plan, which I have reviewed and edited where appropriate.   Patient appears uncomfortable.  Medication adjusted as above.
Pt seen and examined  Not responsive
I have personally seen and examined this patient and agree with the above assessment and plan, which I have reviewed and edited where appropriate.   Pt accepted to inpatient hospice.  Pending signature.
MOLST form completed and Signed, patient  is DNR/ DNI
need to speak with brother about long term care options  and need abx length recs as well as now there are no plans for surgical intervention
Pt is DNR/DNI, see GOC noted
Angelo Aguilera  Attending Physician   Division of Infectious Disease  Pager #[phone]  After 5pm/weekend or no response, call #[phone]
agree w plan as above  bp meds with hold parameters  hold torsemide  gentle ivf today can increase to 50cc if need be  off of rifampin due to rai  vanc level by day if less than <20 can redose  prognosis is guarded  pending abx length plan as no surgical intervention at this time  will speak to cardio about utility of repeating 2d echo given new diastolic murmur on exam

## 2019-04-02 NOTE — PROGRESS NOTE ADULT - PROBLEM SELECTOR PLAN 5
GOC: Decision maker is the patient's brother Alana [phone]. The designated surrogate, stated on 3/28 that even before the patient became so ill, that he was already having issues with independency that were affecting his quality of life (QOL). Alana stated that even if there was a chance for a Rx that he was not able to see how the patient was going to be able to regain independency and instead being on a position were his QOL was to be severely compromised. Alana indicated that understanding the patient's current situation that at this point GOC should be guided towards preventing and treating distressful symptoms. He agree with stopping Abx and any further work up. Will stop all other non symptoms focused meds. Patient is DNR.    Hospice referral initiated today. GOC: Decision maker is the patient's brother Alana [phone]. The designated surrogate, stated on 3/28 that even before the patient became so ill, that he was already having issues with independency that were affecting his quality of life (QOL). Alana stated that even if there was a chance for a Rx that he was not able to see how the patient was going to be able to regain independency and instead being on a position were his QOL was to be severely compromised. Alana indicated that understanding the patient's current situation that at this point GOC should be guided towards preventing and treating distressful symptoms. He agree with stopping Abx and any further work up. Will stop all other non symptoms focused meds. Patient is DNR.    Patient accepted to [facility] for inpatient hospice. Awaiting consents to be signed by brother.

## 2019-04-03 NOTE — PROGRESS NOTE ADULT - REASON FOR ADMISSION
JANEL
Endocarditis
JANEL

## 2019-04-03 NOTE — PROGRESS NOTE ADULT - PROVIDER SPECIALTY LIST ADULT
Cardiology
Infectious Disease
Internal Medicine
Neurology
Palliative Care
Cardiology
Internal Medicine
Palliative Care
Internal Medicine
Palliative Care

## 2019-04-03 NOTE — PROGRESS NOTE ADULT - ASSESSMENT
62 y/o M with bioprosthetic AV & MV c/b endocarditis (on ceftriaxone and rifampin), hypothyroidism, BPH, history of seizures, and HTN, who presents to the hospital from rehab for suicidal ideations found to have AMS, JANEL, fever, all 2/2 endocarditis with suspected hemorrhagic septic emboli by MRI.  Hospital course now complicated by JANEL, fever, tachycardia concerning for severe sepsis. Pt transferred to PCU for further care, now awaiting bed at [facility] for inpatient hospice.

## 2019-04-03 NOTE — PROGRESS NOTE ADULT - SUBJECTIVE AND OBJECTIVE BOX
GAP TEAM PALLIATIVE CARE UNIT PROGRESS NOTE:      [  ] Patient on hospice program.    INDICATION FOR PALLIATIVE CARE UNIT SERVICES: Symptom focused care, pain/agitation management    INTERVAL HPI/OVERNIGHT EVENTS: overnight required 3x BTD of dilaudid and 1x ativan. Seen by [facility] today who met with Alana. Approved for inpatient hospice at [facility] [place] and consents signed but no bed available at this time.   Patient with increased agitation today.    DNR on chart: Yes    Allergies    penicillin (Swelling)    Intolerances    MEDICATIONS  (STANDING):  chlorhexidine 4% Liquid 1 Application(s) Topical two times a day  HYDROmorphone Infusion 0.5 mG/Hr (0.5 mL/Hr) IV Continuous <Continuous>  levETIRAcetam  IVPB 500 milliGRAM(s) IV Intermittent every 12 hours  sodium chloride 0.9%. 1000 milliLiter(s) (10 mL/Hr) IV Continuous <Continuous>    MEDICATIONS  (PRN):  acetaminophen  Suppository .. 650 milliGRAM(s) Rectal every 6 hours PRN Temp greater or equal to 38C (100.4F)  bisacodyl Suppository 10 milliGRAM(s) Rectal daily PRN Constipation  glycopyrrolate Injectable 0.4 milliGRAM(s) IV Push every 6 hours PRN secretions  HYDROmorphone  Injectable 1 milliGRAM(s) IV Push every 1 hour PRN Labored breathing or RR > 28  HYDROmorphone  Injectable 1 milliGRAM(s) IV Push every 1 hour PRN Moderate to Severe pain  LORazepam   Injectable 0.5 milliGRAM(s) IV Push every 1 hour PRN Agitation or recurrent labored breathing after back to back opioids are given.    ITEMS UNCHECKED ARE NOT PRESENT    PRESENT SYMPTOMS: [x ]Unable to obtain due to poor mentation   Source if other than patient:  [ ]Family   [ ]Team     Pain (Impact on QOL):    Location:       Minimal acceptable level (0-10 scale):                    Aggrevating factors:  Quality:  Radiation:  Severity (0-10 scale):     Dyspnea:                           [ ]Mild [ ]Moderate [ ]Severe  Anxiety:                             [ ]Mild [ ]Moderate [ ]Severe  Fatigue:                             [ ]Mild [ ]Moderate [ ]Severe  Nausea:                             [ ]Mild [ ]Moderate [ ]Severe  Loss of appetite:              [ ]Mild [ ]Moderate [ ]Severe  Constipation:                    [ ]Mild [ ]Moderate [ ]Severe    PAINAD Score:    [email or web address] (Ctrl +  left click to view)  		  Other Symptoms:  [ ]All other review of systems negative     Karnofsky Performance Score/Palliative Performance Status Version 2:     10    %  PHYSICAL EXAM:  Vital Signs Last 24 Hrs  T(C): 36.3 (03 Apr 2019 08:47), Max: 36.3 (03 Apr 2019 08:47)  T(F): 97.4 (03 Apr 2019 08:47), Max: 97.4 (03 Apr 2019 08:47)  HR: 142 (03 Apr 2019 08:47) (142 - 142)  BP: 86/63 (03 Apr 2019 08:47) (86/63 - 86/63)  BP(mean): --  RR: 20 (03 Apr 2019 08:47) (20 - 20)  SpO2: 97% (03 Apr 2019 08:47) (97% - 97%) I&O's Summary    02 Apr 2019 07:01  -  03 Apr 2019 07:00  --------------------------------------------------------  IN: 0 mL / OUT: 800 mL / NET: -800 mL    GENERAL:  [ ]Alert  [ ]Oriented x   [ ]Lethargic  [ ]Cachexia  [x ]Unarousable  [ ]Verbal  [ ]Non-Verbal  Behavioral:   [ ] Anxiety  [ ] Delirium [x ] Agitation [ ] Other  HEENT:  [ ]Normal   [x ]Dry mouth   [ ]ET Tube/Trach  [ ]Oral lesions  PULMONARY:   [x ]Clear [ ]Tachypnea  [ ]Audible excessive secretions   [ ]Rhonchi        [ ]Right [ ]Left [ ]Bilateral  [ ]Crackles        [ ]Right [ ]Left [ ]Bilateral  [ ]Wheezing     [ ]Right [ ]Left [ ]Bilateral  CARDIOVASCULAR:    [ ]Regular [ ]Irregular [x ]Tachy  [ ]Bolivar [ ]Murmur [ ]Other  GASTROINTESTINAL:  [x ]Soft  [ ]Distended   [x ]+BS  [ x]Non tender [ ]Tender  [ ]PEG [ ]OGT/ NGT   Last BM:   4/1  GENITOURINARY:  [ ]Normal [ ] Incontinent   [ ]Oliguria/Anuria   [ x]Griffith  MUSCULOSKELETAL:   [ ]Normal   [x ]Weakness  [ ]Bed/Wheelchair bound [ ]Edema  NEUROLOGIC: minimally responsive  [ ]No focal deficits  [x ] Cognitive impairment  [ ] Dysphagia [ ]Dysarthria [ ] Paresis [ ]Other   SKIN: cool to touch on lower extremities  [ ]Normal   [ ]Pressure ulcer(s)  [ ]Rash    CRITICAL CARE:  [ ] Shock Present  [ ]Septic [ ]Cardiogenic [ ]Neurologic [ ]Hypovolemic  [ ]  Vasopressors [ ]  Inotropes   [ ] Respiratory failure present  [ ] Acute  [ ] Chronic [ ] Hypoxic  [ ] Hypercarbic [ ] Other  [ ] Other organ failure     LABS: no new labs    RADIOLOGY & ADDITIONAL STUDIES: no new imaging studies    PROTEIN CALORIE MALNUTRITION:   [x ] PPSV2 < or = 30% [ ] significant weight loss [ ] poor nutritional intake [ ] anasarca [x ] catabolic state   Albumin, Serum: 2.6 g/dL (03-28-19 @ 06:53)   Artificial Nutrition [ ]     REFERRALS:   [ ]Chaplaincy  [ ] Hospice  [ ]Child Life  [x ]Social Work  [ ]Case management [ ]Holistic Therapy [ ] Physical Therapy [ ] Dietary   Goals of Care Document: Goals of Care Conversation - Personal Advance Directive [MONIQUE Saavedra] (04-02-19 @ 16:00)

## 2019-04-03 NOTE — PROGRESS NOTE ADULT - PROBLEM SELECTOR PLAN 5
GOC: Decision maker is the patient's brother Alana [phone].     Patient awaiting bed at [facility] for inpatient hospice. consents signed today. No bed currently available. continue symptom management as above and will continue to follow up with [facility]

## 2019-04-03 NOTE — PROGRESS NOTE ADULT - PROBLEM SELECTOR PLAN 2
2/2 to Encephalopathy likely secondary to septic emboli.   ativan 0.5 mg IV q 6 prn  Lorazepam used x 1 overnight

## 2019-04-04 NOTE — PROVIDER CONTACT NOTE (OTHER) - SITUATION
pt had 4 beats of aivr and then 5 beats of aivr
10 beats wct
Patient had 11WCT on telemetry
Patient had multiple triplets (3 beats WCT's) over 1 minute
Patient with 5 beats of WCT.
Pt admitted with ARF, unable to tolerate PO medications.
Pt agitated, refusing vital signs & neurological check & NIH Stroke scale. Ativan given as ordered for agitation
Pt bp manually 98/56
Pt had 11 beats of WCT, Pt asymptomatic
Pt with 6 beats of WCT on tele.
Pt without spontaneous respirations or pulse
Tele tech just notified RN that pt had 4 beats WCT, Pt asymptomatic
patient c/o LLQ abdominal pain
pt refusing all 1400 medications
Patient with oral temperature of 100.5F
Pt admitted with ARF

## 2019-04-04 NOTE — PROVIDER CONTACT NOTE (OTHER) - RECOMMENDATIONS
Pt pronounced dead at 0310, pending call back from Josue (brother/proxy) Pt pronounced dead at 0310, Josue (brother/proxy) made aware

## 2019-04-04 NOTE — PROVIDER CONTACT NOTE (OTHER) - ACTION/TREATMENT ORDERED:
Noted.  Continue to monitor
MD aware, will continue to monitor pt &maintain safety.
MD aware, will continue to monitor pt on tele
MD aware, will continue to monitor pt on telemetry.
Noted.  Continue to monitor at this time.
Gurjit MORRISON aware and acknowledged. No interventions at this time as per provider. Will continue to monitor.
Micky Cagle MD aware and acknowledged. Provider will order pain medication and examine patient at bedside. Will continue to monitor.
NP aware, NP assessed patient at bedside, labs ordered. Will continue to monitor patient.
No new orders
Noted.  Continue to monitor
Noted.  Continue to monitor at this time.
Noted.  No new orders at this time. Will continue to monitor
See pt expiration note
continue to monitor.
pending stat echo
NP made aware, per NP continue IV cefepime r/t endocarditis. Continue to monitor patient.

## 2019-04-04 NOTE — PROVIDER CONTACT NOTE (OTHER) - NAME OF MD/NP/PA/DO NOTIFIED:
MD Jo
Dr. Cornell answering service
Dr. Hedjar Aryles, MD
Dr. Jo
Dr. Jo (Team 3)
Garret Sue
Gurjit MORRISON
MD Brian Sue
MD Brian Sue
MD Sue
Micky Cagle MD
Roly Syed, NP
Team 3
Team 3 MD
Dr. Mo
Roly Syed, NP

## 2019-04-04 NOTE — PROVIDER CONTACT NOTE (OTHER) - REASON
10 beats wct
11 beats of WCT
4 beats WCT
6 beats of WCT
Patient had 11WCT on telemetry
Patient had multiple triplets (3 beats WCT's) over 1 minute
Patient with 5 beats of WCT
Pt agitated, refusing vital signs & neurological check & NIH Stroke scale
Pt bp 98/56
Pt refusing medications
Pt without spontaneous respirations or pulse
patient c/o LLQ abdominal pain
unable to tolerate PO meds
Patient with oral temperature of 100.5F
unable to tolerate PO meds
ectopy

## 2019-04-04 NOTE — DISCHARGE NOTE FOR THE EXPIRED PATIENT - HOSPITAL COURSE
64 y/o M with bioprosthetic AV & MV c/b endocarditis (on ceftriaxone and rifampin), hypothyroidism, BPH, history of seizures, and HTN, who presented to the hospital from rehab for suicidal ideations found to have AMS, JANEL, fever, all 2/2 endocarditis with suspected hemorrhagic septic emboli by MRI.  Hospital course now complicated by JANEL, fever, tachycardia concerning for severe sepsis. Palliative care was consulted as family had opted for comfort care yesterday during an RRT.     Decision was made by family for full comfort measures. Patient was treated for pain and agitation with Dilaudid drip, Dilaudid prn and Lorazepam. Patient  in the PCU on 19.

## 2019-04-04 NOTE — PROVIDER CONTACT NOTE (OTHER) - DATE AND TIME:
19-Mar-2019 23:29
04-Apr-2019 03:31
15-Mar-2019 20:15
19-Mar-2019 14:50
19-Mar-2019 19:45
22-Mar-2019 13:30
23-Mar-2019 02:03
23-Mar-2019 08:28
23-Mar-2019 12:00
24-Mar-2019 11:50
25-Mar-2019 23:00
26-Mar-2019 04:55
27-Mar-2019 06:00
28-Mar-2019 06:00
16-Mar-2019 00:18
27-Mar-2019 18:58

## 2019-04-04 NOTE — PROVIDER CONTACT NOTE (OTHER) - BACKGROUND
Patient admitted for acute renal failure. HX of CHF, BPH, HTN, Endocarditis.
Pt admitted for rai
Admit Dx: Acute renal failure, AMS, endocarditis
Admit Dx: Acute renal failure. Hx: endocarditis with septic emboli, AMS.
Admit Dx: Acute renal failure. Hx: endocarditis with septic emboli, AMS.
Patient admitted for acute renal failure , endocarditis, mitral valve vegetation
Patient admitted for acute renal failure, history of CHF, endocarditis, HTN, BPH
Pt admitted for acute renal failure endocarditis. Pt has hx of WCT during this admission
Pt admitted for acute renal failure/ endocarditis. Pt has hx of CHF, BPH, HTN. Pt has hx of WCT during this admission
Pt admitted for acute renal failure/ endocarditis. Pt has hx of CHF, HTN, BPH
Pt admitted for endocarditis c.b septic emboli with AMS
Pt had DNR order
admitted with ARF.  Currently being treated with endocarditis. NSR on tele.
admitted with ARF.  being treated for endocarditis.
pt with endocarditis, vegatation on mv
endocarditis c.b septic emboli with AMS

## 2019-04-26 NOTE — PROGRESS NOTE BEHAVIORAL HEALTH - NS ED [facility] MED ROS HEMATOLOGIC LYMPHATIC
No complaints I have personally seen and examined this patient.  I have fully participated in the care of this patient. I have reviewed all pertinent clinical information, including history, physical exam, plan and the Resident’s note and agree except as noted.

## 2019-05-11 LAB
CULTURE RESULTS: SIGNIFICANT CHANGE UP
SPECIMEN SOURCE: SIGNIFICANT CHANGE UP

## 2019-07-02 NOTE — ED PROVIDER NOTE - CPE EDP NEURO NORM
Constitutional: No fever, chills, unintended weight loss.  Eyes:  No visual changes, eye pain or discharge.  ENMT:  No hearing changes, pain, no sore throat or runny nose, no difficulty swallowing  Cardiac:  No chest pain, SOB or edema. No chest pain with exertion.  Respiratory:  No cough or respiratory distress. No hemoptysis. No history of asthma or RAD.  GI:  see hpi  :  No dysuria, frequency or burning.  MS:  No myalgia, muscle weakness, joint pain or back pain.  Neuro:  No headache or weakness.  No LOC.  Skin:  No skin rash.   Endocrine: No history of thyroid disease or diabetes. normal...

## 2019-12-21 NOTE — OCCUPATIONAL THERAPY INITIAL EVALUATION ADULT - BALANCE DISTURBANCE, SYSTEM IMPAIRMENT CONTRIBUTE, REHAB EVAL
You can access the FollowMyHealth Patient Portal offered by [facility] by registering at the following website: [email or web address]. By joining [facility]’s FollowMyHealth portal, you will also be able to view your health information using other applications (apps) compatible with our system.
neuromuscular/cognitive/musculoskeletal

## 2021-02-07 NOTE — ED ADULT TRIAGE NOTE - HEART RATE (BEATS/MIN)
Lab Results   Component Value Date    HGBA1C 7 7 (H) 02/03/2021       Recent Labs     02/06/21  1537 02/06/21 2024 02/07/21  0716 02/07/21  1121   POCGLU 154* 180* 155* 134       Blood Sugar Average: Last 72 hrs:  (P) 175 125 diabetic diet  Continue sliding scale insulin  Patient signed AMA 85

## 2021-10-15 NOTE — PROGRESS NOTE ADULT - ASSESSMENT
LCV: LCV:3/19/2021  [facility] hx: Rx by Mercedes     64 y/o M w/t PMHx of bioprosthetic AV & MV c/b endocarditis (on ceftriaxone and rifampin), hypothyroidism, BPH, history of seizures, and HTN, who presents to the hospital from rehab for suicidal ideations found to have AMS, JANEL, fever, all 2/2 endocarditis with suspected hemorrhagic septic emboli by MR. TORREZ conversation w/t brothers held, patient now DNR/DNI 3/23. Will continue with medical management for now as the patient refused doses of rifampin twice so cannot be ruled as treatment failure as of yet. TTE pending. 62 y/o M w/t PMHx of bioprosthetic AV & MV c/b endocarditis (on ceftriaxone and rifampin), hypothyroidism, BPH, history of seizures, and HTN, who presents to the hospital from rehab for suicidal ideations found to have AMS, JANEL, fever, all 2/2 endocarditis with suspected hemorrhagic septic emboli by MR. TORREZ conversation w/t brothers held, patient now DNR/DNI 3/23. Will continue with medical management for now as the patient refused doses of rifampin twice so cannot be ruled as treatment failure as of yet. TTE this AM demonstrating acute drop in EF to 16% w/t 1 long vegetation extending from the MV deep into the LV. Will discuss results with CT surgery. Dose 250mg vanc tonight.

## 2021-11-26 NOTE — ED ADULT NURSE NOTE - NS ED NURSE RECORD ANOTHER HT AND WT
LOV: 4/16/21  for: Physical  Patient advised to RTC on:  6 months  Previous Rx:  Furosemide 20mgà Sig: Take 1 tablet by mouth daily. Disp #90/0 refills. LF: 9/1/21    My chart message sent. Yes

## 2021-12-15 NOTE — PATIENT PROFILE ADULT - NSPRONUTRITIONRISK_GEN_A_NUR
[Normal] : mucous membranes moist, no mouth sores or mucosal bleeding, normal dentition, symmetric facies [de-identified] : mild redness to throat noted No indicators present

## 2022-01-25 NOTE — DISCHARGE NOTE FOR THE EXPIRED PATIENT - OTHER
1/25/22, 8:21 AM EST    DISCHARGE PLANNING EVALUATION    Spoke with Dewayne Jo at [facility]. They have accepted Brenda Walker. Updated his friend Catherine Terrell. Will updated Brenda Walker. Update 1:34pm: Spoke with Juany at [facility].. She told SW that if the patient does not do ADL's and or walk they can't skill him. After some discussion she agreed to try to skill him for nursing for his wound and ostomy. Updated patients friend Catherine Terrell. He will try to encourage Brenda Walker to work with therapy. endocarditis

## 2023-06-12 NOTE — BEHAVIORAL HEALTH ASSESSMENT NOTE - NS ED [facility] DEMOGRAPHICS MEDICAL RECORD REVIEWED YES RECORDS
no abdominal pain, no bloating, no constipation, no diarrhea, no nausea and no vomiting. Hospital chart

## 2025-02-14 NOTE — PROGRESS NOTE BEHAVIORAL HEALTH - GAIT / STATION
PM blood glucose of 103   Dose reduce glargine by 50% from 15 units down to 7 units qhs     Has ongoing D5 at 50 ml/hr     Renato Cheung MD    Other